# Patient Record
Sex: FEMALE | Race: BLACK OR AFRICAN AMERICAN | NOT HISPANIC OR LATINO | Employment: FULL TIME | RURAL
[De-identification: names, ages, dates, MRNs, and addresses within clinical notes are randomized per-mention and may not be internally consistent; named-entity substitution may affect disease eponyms.]

---

## 2019-07-12 ENCOUNTER — HISTORICAL (OUTPATIENT)
Dept: ADMINISTRATIVE | Facility: HOSPITAL | Age: 29
End: 2019-07-12

## 2019-07-15 LAB
LAB AP CLINICAL INFORMATION: NORMAL
LAB AP DIAGNOSIS - HISTORICAL: NORMAL
LAB AP GROSS PATHOLOGY - HISTORICAL: NORMAL
LAB AP SPECIMEN SUBMITTED - HISTORICAL: NORMAL

## 2020-03-30 ENCOUNTER — HISTORICAL (OUTPATIENT)
Dept: ADMINISTRATIVE | Facility: HOSPITAL | Age: 30
End: 2020-03-30

## 2020-03-30 LAB
ALBUMIN SERPL BCP-MCNC: 3.7 G/DL (ref 3.5–5)
ALBUMIN/GLOB SERPL: 1 {RATIO}
ALP SERPL-CCNC: 37 U/L (ref 37–98)
ALT SERPL W P-5'-P-CCNC: 13 U/L (ref 13–56)
AST SERPL W P-5'-P-CCNC: 10 U/L (ref 15–37)
BASOPHILS # BLD AUTO: 0.03 X10E3/UL (ref 0–0.2)
BASOPHILS NFR BLD AUTO: 0.5 % (ref 0–1)
BILIRUB SERPL-MCNC: 0.5 MG/DL (ref 0–1.2)
BUN SERPL-MCNC: 16 MG/DL (ref 7–18)
BUN/CREAT SERPL: 18.2
CALCIUM SERPL-MCNC: 8.7 MG/DL (ref 8.5–10.1)
CHLORIDE SERPL-SCNC: 105 MMOL/L (ref 98–107)
CO2 SERPL-SCNC: 22 MMOL/L (ref 21–32)
CREAT SERPL-MCNC: 0.88 MG/DL (ref 0.55–1.02)
EOSINOPHIL # BLD AUTO: 0.02 X10E3/UL (ref 0–0.5)
EOSINOPHIL NFR BLD AUTO: 0.3 % (ref 1–4)
ERYTHROCYTE [DISTWIDTH] IN BLOOD BY AUTOMATED COUNT: 13.6 % (ref 11.5–14.5)
GLOBULIN SER-MCNC: 3.7 G/DL (ref 2–4)
GLUCOSE SERPL-MCNC: 85 MG/DL (ref 74–106)
HCT VFR BLD AUTO: 34.9 % (ref 38–47)
HGB BLD-MCNC: 10.9 G/DL (ref 12–16)
IMM GRANULOCYTES # BLD AUTO: 0.02 X10E3/UL (ref 0–0.04)
IMM GRANULOCYTES NFR BLD: 0.3 % (ref 0–0.4)
LYMPHOCYTES # BLD AUTO: 1.44 X10E3/UL (ref 1–4.8)
LYMPHOCYTES NFR BLD AUTO: 24.1 % (ref 27–41)
MCH RBC QN AUTO: 28.2 PG (ref 27–31)
MCHC RBC AUTO-ENTMCNC: 31.2 G/DL (ref 32–36)
MCV RBC AUTO: 90.4 FL (ref 80–96)
MONOCYTES # BLD AUTO: 0.31 X10E3/UL (ref 0–0.8)
MONOCYTES NFR BLD AUTO: 5.2 % (ref 2–6)
MPC BLD CALC-MCNC: 10.3 FL (ref 9.4–12.4)
NEUTROPHILS # BLD AUTO: 4.15 X10E3/UL (ref 1.8–7.7)
NEUTROPHILS NFR BLD AUTO: 69.6 % (ref 53–65)
NRBC # BLD AUTO: 0 X10E3/UL (ref 0–0)
NRBC, AUTO (.00): 0 /100 (ref 0–0)
PLATELET # BLD AUTO: 226 X10E3/UL (ref 150–400)
POTASSIUM SERPL-SCNC: 3.6 MMOL/L (ref 3.5–5.1)
PROT SERPL-MCNC: 7.4 G/DL (ref 6.4–8.2)
RBC # BLD AUTO: 3.86 X10E6/UL (ref 4.2–5.4)
SODIUM SERPL-SCNC: 138 MMOL/L (ref 136–145)
TROPONIN I SERPL-MCNC: <0.017 NG/ML (ref 0–0.06)
WBC # BLD AUTO: 5.97 X10E3/UL (ref 4.5–11)

## 2020-04-06 ENCOUNTER — HISTORICAL (OUTPATIENT)
Dept: ADMINISTRATIVE | Facility: HOSPITAL | Age: 30
End: 2020-04-06

## 2020-04-06 LAB — HCG UR QL IA.RAPID: NEGATIVE

## 2020-06-07 ENCOUNTER — HISTORICAL (OUTPATIENT)
Dept: ADMINISTRATIVE | Facility: HOSPITAL | Age: 30
End: 2020-06-07

## 2020-06-07 LAB
BILIRUB UR QL STRIP: NEGATIVE MG/DL
CLARITY UR: CLEAR
COLOR UR: YELLOW
GLUCOSE UR STRIP-MCNC: NEGATIVE MG/DL
KETONES UR STRIP-SCNC: ABNORMAL MG/DL
LEUKOCYTE ESTERASE UR QL STRIP: NEGATIVE LEU/UL
NITRITE UR QL STRIP: NEGATIVE
PH UR STRIP: 7 PH UNITS (ref 5–8)
PROT UR QL STRIP: ABNORMAL MG/DL
RBC # UR STRIP: NEGATIVE ERY/UL
SP GR UR STRIP: 1.02 (ref 1–1.03)
UROBILINOGEN UR STRIP-ACNC: 1 EU/DL

## 2020-06-08 ENCOUNTER — HISTORICAL (OUTPATIENT)
Dept: ADMINISTRATIVE | Facility: HOSPITAL | Age: 30
End: 2020-06-08

## 2020-09-18 ENCOUNTER — HISTORICAL (OUTPATIENT)
Dept: ADMINISTRATIVE | Facility: HOSPITAL | Age: 30
End: 2020-09-18

## 2020-09-18 LAB
BILIRUB UR QL STRIP: NEGATIVE MG/DL
CLARITY UR: CLEAR
COLOR UR: YELLOW
GLUCOSE UR STRIP-MCNC: NEGATIVE MG/DL
KETONES UR STRIP-SCNC: ABNORMAL MG/DL
LEUKOCYTE ESTERASE UR QL STRIP: NEGATIVE LEU/UL
NITRITE UR QL STRIP: NEGATIVE
PH UR STRIP: 6.5 PH UNITS (ref 5–8)
PROT UR QL STRIP: ABNORMAL MG/DL
RBC # UR STRIP: ABNORMAL ERY/UL
SP GR UR STRIP: 1.02 (ref 1–1.03)
UROBILINOGEN UR STRIP-ACNC: 1 EU/DL

## 2020-09-20 LAB
CHLAMYDIA BY PCR: NEGATIVE
N. GONORRHOEAE (GC) BY PCR: NEGATIVE

## 2020-11-20 ENCOUNTER — HISTORICAL (OUTPATIENT)
Dept: ADMINISTRATIVE | Facility: HOSPITAL | Age: 30
End: 2020-11-20

## 2020-11-20 LAB
ALBUMIN SERPL BCP-MCNC: 4.5 G/DL (ref 3.5–5)
ALBUMIN/GLOB SERPL: 1.2 {RATIO}
ALP SERPL-CCNC: 44 U/L (ref 37–98)
ALT SERPL W P-5'-P-CCNC: 16 U/L (ref 13–56)
AMYLASE SERPL-CCNC: 109 U/L (ref 25–115)
ANION GAP SERPL CALCULATED.3IONS-SCNC: 16 MMOL/L
AST SERPL W P-5'-P-CCNC: 16 U/L (ref 15–37)
BASOPHILS # BLD AUTO: 0.04 X10E3/UL (ref 0–0.2)
BASOPHILS NFR BLD AUTO: 0.6 % (ref 0–1)
BILIRUB SERPL-MCNC: 0.4 MG/DL (ref 0–1.2)
BILIRUB UR QL STRIP: NEGATIVE MG/DL
BUN SERPL-MCNC: 19 MG/DL (ref 7–18)
BUN/CREAT SERPL: 20.4
CALCIUM SERPL-MCNC: 8.9 MG/DL (ref 8.5–10.1)
CHLORIDE SERPL-SCNC: 104 MMOL/L (ref 98–107)
CLARITY UR: CLEAR
CO2 SERPL-SCNC: 22 MMOL/L (ref 21–32)
COLOR UR: ABNORMAL
CREAT SERPL-MCNC: 0.93 MG/DL (ref 0.55–1.02)
EOSINOPHIL # BLD AUTO: 0.03 X10E3/UL (ref 0–0.5)
EOSINOPHIL NFR BLD AUTO: 0.4 % (ref 1–4)
ERYTHROCYTE [DISTWIDTH] IN BLOOD BY AUTOMATED COUNT: 16 % (ref 11.5–14.5)
GLOBULIN SER-MCNC: 3.7 G/DL (ref 2–4)
GLUCOSE SERPL-MCNC: 85 MG/DL (ref 74–106)
GLUCOSE UR STRIP-MCNC: NEGATIVE MG/DL
HCT VFR BLD AUTO: 34.2 % (ref 38–47)
HGB BLD-MCNC: 10.6 G/DL (ref 12–16)
IMM GRANULOCYTES # BLD AUTO: 0.01 X10E3/UL (ref 0–0.04)
IMM GRANULOCYTES NFR BLD: 0.1 % (ref 0–0.4)
KETONES UR STRIP-SCNC: NEGATIVE MG/DL
LEUKOCYTE ESTERASE UR QL STRIP: NEGATIVE LEU/UL
LIPASE SERPL-CCNC: 101 U/L (ref 73–393)
LYMPHOCYTES # BLD AUTO: 1.7 X10E3/UL (ref 1–4.8)
LYMPHOCYTES NFR BLD AUTO: 23.8 % (ref 27–41)
MCH RBC QN AUTO: 27.2 PG (ref 27–31)
MCHC RBC AUTO-ENTMCNC: 31 G/DL (ref 32–36)
MCV RBC AUTO: 87.7 FL (ref 80–96)
MONOCYTES # BLD AUTO: 0.28 X10E3/UL (ref 0–0.8)
MONOCYTES NFR BLD AUTO: 3.9 % (ref 2–6)
MPC BLD CALC-MCNC: 10.3 FL (ref 9.4–12.4)
NEUTROPHILS # BLD AUTO: 5.09 X10E3/UL (ref 1.8–7.7)
NEUTROPHILS NFR BLD AUTO: 71.2 % (ref 53–65)
NITRITE UR QL STRIP: NEGATIVE
NRBC # BLD AUTO: 0 X10E3/UL (ref 0–0)
NRBC, AUTO (.00): 0 /100 (ref 0–0)
PH UR STRIP: 6.5 PH UNITS (ref 5–8)
PLATELET # BLD AUTO: 332 X10E3/UL (ref 150–400)
POTASSIUM SERPL-SCNC: 3.7 MMOL/L (ref 3.5–5.1)
PROT SERPL-MCNC: 8.2 G/DL (ref 6.4–8.2)
PROT UR QL STRIP: NEGATIVE MG/DL
RBC # BLD AUTO: 3.9 X10E6/UL (ref 4.2–5.4)
RBC # UR STRIP: NEGATIVE ERY/UL
SARS-COV-2 RNA AMPLIFICATION, QUAL: NEGATIVE
SODIUM SERPL-SCNC: 138 MMOL/L (ref 136–145)
SP GR UR STRIP: 1.02 (ref 1–1.03)
UROBILINOGEN UR STRIP-ACNC: 0.2 EU/DL
WBC # BLD AUTO: 7.15 X10E3/UL (ref 4.5–11)

## 2021-01-25 ENCOUNTER — HISTORICAL (OUTPATIENT)
Dept: ADMINISTRATIVE | Facility: HOSPITAL | Age: 31
End: 2021-01-25

## 2021-01-25 LAB
ALBUMIN SERPL BCP-MCNC: 3.7 G/DL (ref 3.5–5)
ALBUMIN/GLOB SERPL: 1 {RATIO}
ALP SERPL-CCNC: 39 U/L (ref 37–98)
ALT SERPL W P-5'-P-CCNC: 19 U/L (ref 13–56)
ANION GAP SERPL CALCULATED.3IONS-SCNC: 12 MMOL/L
AST SERPL W P-5'-P-CCNC: 16 U/L (ref 15–37)
BASOPHILS # BLD AUTO: 0.03 X10E3/UL (ref 0–0.2)
BASOPHILS NFR BLD AUTO: 0.7 % (ref 0–1)
BILIRUB SERPL-MCNC: 0.4 MG/DL (ref 0–1.2)
BILIRUB UR QL STRIP: NEGATIVE MG/DL
BUN SERPL-MCNC: 14 MG/DL (ref 7–18)
BUN/CREAT SERPL: 17.5
CALCIUM SERPL-MCNC: 8.7 MG/DL (ref 8.5–10.1)
CHLORIDE SERPL-SCNC: 107 MMOL/L (ref 98–107)
CLARITY UR: CLEAR
CO2 SERPL-SCNC: 28 MMOL/L (ref 21–32)
COLOR UR: ABNORMAL
CREAT SERPL-MCNC: 0.8 MG/DL (ref 0.55–1.02)
EOSINOPHIL # BLD AUTO: 0.04 X10E3/UL (ref 0–0.5)
EOSINOPHIL NFR BLD AUTO: 1 % (ref 1–4)
ERYTHROCYTE [DISTWIDTH] IN BLOOD BY AUTOMATED COUNT: 15 % (ref 11.5–14.5)
GLOBULIN SER-MCNC: 3.7 G/DL (ref 2–4)
GLUCOSE SERPL-MCNC: 87 MG/DL (ref 74–106)
GLUCOSE UR STRIP-MCNC: NEGATIVE MG/DL
HCG UR QL IA.RAPID: NEGATIVE
HCT VFR BLD AUTO: 33 % (ref 38–47)
HGB BLD-MCNC: 10.2 G/DL (ref 12–16)
IMM GRANULOCYTES # BLD AUTO: 0.01 X10E3/UL (ref 0–0.04)
IMM GRANULOCYTES NFR BLD: 0.2 % (ref 0–0.4)
KETONES UR STRIP-SCNC: NEGATIVE MG/DL
LEUKOCYTE ESTERASE UR QL STRIP: NEGATIVE LEU/UL
LYMPHOCYTES # BLD AUTO: 1.63 X10E3/UL (ref 1–4.8)
LYMPHOCYTES NFR BLD AUTO: 39.1 % (ref 27–41)
MCH RBC QN AUTO: 26.8 PG (ref 27–31)
MCHC RBC AUTO-ENTMCNC: 30.9 G/DL (ref 32–36)
MCV RBC AUTO: 86.6 FL (ref 80–96)
MONOCYTES # BLD AUTO: 0.32 X10E3/UL (ref 0–0.8)
MONOCYTES NFR BLD AUTO: 7.7 % (ref 2–6)
MPC BLD CALC-MCNC: 10.1 FL (ref 9.4–12.4)
NEUTROPHILS # BLD AUTO: 2.14 X10E3/UL (ref 1.8–7.7)
NEUTROPHILS NFR BLD AUTO: 51.3 % (ref 53–65)
NITRITE UR QL STRIP: NEGATIVE
NRBC # BLD AUTO: 0 X10E3/UL (ref 0–0)
NRBC, AUTO (.00): 0 /100 (ref 0–0)
PH UR STRIP: 7.5 PH UNITS (ref 5–8)
PLATELET # BLD AUTO: 273 X10E3/UL (ref 150–400)
POTASSIUM SERPL-SCNC: 4.2 MMOL/L (ref 3.5–5.1)
PROT SERPL-MCNC: 7.4 G/DL (ref 6.4–8.2)
PROT UR QL STRIP: NEGATIVE MG/DL
RBC # BLD AUTO: 3.81 X10E6/UL (ref 4.2–5.4)
RBC # UR STRIP: NEGATIVE ERY/UL
SODIUM SERPL-SCNC: 143 MMOL/L (ref 136–145)
SP GR UR STRIP: 1.02 (ref 1–1.03)
UROBILINOGEN UR STRIP-ACNC: 0.2 EU/DL
WBC # BLD AUTO: 4.17 X10E3/UL (ref 4.5–11)

## 2021-02-26 ENCOUNTER — HISTORICAL (OUTPATIENT)
Dept: ADMINISTRATIVE | Facility: HOSPITAL | Age: 31
End: 2021-02-26

## 2021-03-11 ENCOUNTER — HISTORICAL (OUTPATIENT)
Dept: ADMINISTRATIVE | Facility: HOSPITAL | Age: 31
End: 2021-03-11

## 2021-03-16 LAB
LAB AP GENERAL CAT - HISTORICAL: NORMAL
LAB AP INTERPRETATION/RESULT - HISTORICAL: NEGATIVE
LAB AP SPECIMEN ADEQUACY - HISTORICAL: NORMAL
LAB AP SPECIMEN SUBMITTED - HISTORICAL: NORMAL

## 2021-06-27 ENCOUNTER — HOSPITAL ENCOUNTER (EMERGENCY)
Facility: HOSPITAL | Age: 31
Discharge: HOME | End: 2021-06-28
Attending: EMERGENCY MEDICINE
Payer: COMMERCIAL

## 2021-06-27 VITALS
SYSTOLIC BLOOD PRESSURE: 154 MMHG | RESPIRATION RATE: 16 BRPM | DIASTOLIC BLOOD PRESSURE: 116 MMHG | TEMPERATURE: 99 F | BODY MASS INDEX: 27.64 KG/M2 | HEART RATE: 87 BPM | OXYGEN SATURATION: 98 % | WEIGHT: 172 LBS | HEIGHT: 66 IN

## 2021-06-27 DIAGNOSIS — G43.001 MIGRAINE WITHOUT AURA AND WITH STATUS MIGRAINOSUS, NOT INTRACTABLE: Primary | ICD-10-CM

## 2021-06-27 DIAGNOSIS — R07.9 CHEST PAIN: ICD-10-CM

## 2021-06-27 LAB — TROPONIN I SERPL-MCNC: <0.017 NG/ML

## 2021-06-27 PROCEDURE — 99283 EMERGENCY DEPT VISIT LOW MDM: CPT | Mod: ,,, | Performed by: EMERGENCY MEDICINE

## 2021-06-27 PROCEDURE — 99283 PR EMERGENCY DEPT VISIT,LEVEL III: ICD-10-PCS | Mod: ,,, | Performed by: EMERGENCY MEDICINE

## 2021-06-27 PROCEDURE — 93010 EKG 12-LEAD: ICD-10-PCS | Mod: ,,, | Performed by: HOSPITALIST

## 2021-06-27 PROCEDURE — 93005 ELECTROCARDIOGRAM TRACING: CPT

## 2021-06-27 PROCEDURE — 96365 THER/PROPH/DIAG IV INF INIT: CPT

## 2021-06-27 PROCEDURE — 99285 EMERGENCY DEPT VISIT HI MDM: CPT | Mod: 25

## 2021-06-27 PROCEDURE — 36415 COLL VENOUS BLD VENIPUNCTURE: CPT | Performed by: EMERGENCY MEDICINE

## 2021-06-27 PROCEDURE — 63600175 PHARM REV CODE 636 W HCPCS: Performed by: EMERGENCY MEDICINE

## 2021-06-27 PROCEDURE — 93010 ELECTROCARDIOGRAM REPORT: CPT | Mod: ,,, | Performed by: HOSPITALIST

## 2021-06-27 PROCEDURE — 84484 ASSAY OF TROPONIN QUANT: CPT | Performed by: EMERGENCY MEDICINE

## 2021-06-27 RX ORDER — ACETAMINOPHEN 10 MG/ML
1000 INJECTION, SOLUTION INTRAVENOUS EVERY 8 HOURS
Status: DISCONTINUED | OUTPATIENT
Start: 2021-06-27 | End: 2021-06-28 | Stop reason: HOSPADM

## 2021-06-27 RX ORDER — PROCHLORPERAZINE EDISYLATE 5 MG/ML
10 INJECTION INTRAMUSCULAR; INTRAVENOUS
Status: DISCONTINUED | OUTPATIENT
Start: 2021-06-27 | End: 2021-06-28 | Stop reason: HOSPADM

## 2021-06-27 RX ADMIN — ACETAMINOPHEN 1000 MG: 10 INJECTION, SOLUTION INTRAVENOUS at 10:06

## 2021-09-02 ENCOUNTER — OFFICE VISIT (OUTPATIENT)
Dept: NEUROLOGY | Facility: CLINIC | Age: 31
End: 2021-09-02
Payer: COMMERCIAL

## 2021-09-02 VITALS
HEIGHT: 66 IN | WEIGHT: 172 LBS | SYSTOLIC BLOOD PRESSURE: 150 MMHG | DIASTOLIC BLOOD PRESSURE: 100 MMHG | BODY MASS INDEX: 27.64 KG/M2 | OXYGEN SATURATION: 99 % | HEART RATE: 91 BPM

## 2021-09-02 DIAGNOSIS — Z91.148 NON COMPLIANCE W MEDICATION REGIMEN: ICD-10-CM

## 2021-09-02 DIAGNOSIS — G44.40 REBOUND HEADACHE: ICD-10-CM

## 2021-09-02 DIAGNOSIS — I10 HYPERTENSION, UNSPECIFIED TYPE: ICD-10-CM

## 2021-09-02 PROCEDURE — 1159F MED LIST DOCD IN RCRD: CPT | Mod: ,,, | Performed by: NURSE PRACTITIONER

## 2021-09-02 PROCEDURE — 1159F PR MEDICATION LIST DOCUMENTED IN MEDICAL RECORD: ICD-10-PCS | Mod: ,,, | Performed by: NURSE PRACTITIONER

## 2021-09-02 PROCEDURE — 3077F SYST BP >= 140 MM HG: CPT | Mod: ,,, | Performed by: NURSE PRACTITIONER

## 2021-09-02 PROCEDURE — 99214 PR OFFICE/OUTPT VISIT, EST, LEVL IV, 30-39 MIN: ICD-10-PCS | Mod: S$PBB,,, | Performed by: NURSE PRACTITIONER

## 2021-09-02 PROCEDURE — 1160F RVW MEDS BY RX/DR IN RCRD: CPT | Mod: ,,, | Performed by: NURSE PRACTITIONER

## 2021-09-02 PROCEDURE — 3080F DIAST BP >= 90 MM HG: CPT | Mod: ,,, | Performed by: NURSE PRACTITIONER

## 2021-09-02 PROCEDURE — 3080F PR MOST RECENT DIASTOLIC BLOOD PRESSURE >= 90 MM HG: ICD-10-PCS | Mod: ,,, | Performed by: NURSE PRACTITIONER

## 2021-09-02 PROCEDURE — 99214 OFFICE O/P EST MOD 30 MIN: CPT | Mod: PBBFAC | Performed by: NURSE PRACTITIONER

## 2021-09-02 PROCEDURE — 3008F BODY MASS INDEX DOCD: CPT | Mod: ,,, | Performed by: NURSE PRACTITIONER

## 2021-09-02 PROCEDURE — 1160F PR REVIEW ALL MEDS BY PRESCRIBER/CLIN PHARMACIST DOCUMENTED: ICD-10-PCS | Mod: ,,, | Performed by: NURSE PRACTITIONER

## 2021-09-02 PROCEDURE — 3077F PR MOST RECENT SYSTOLIC BLOOD PRESSURE >= 140 MM HG: ICD-10-PCS | Mod: ,,, | Performed by: NURSE PRACTITIONER

## 2021-09-02 PROCEDURE — 3008F PR BODY MASS INDEX (BMI) DOCUMENTED: ICD-10-PCS | Mod: ,,, | Performed by: NURSE PRACTITIONER

## 2021-09-02 PROCEDURE — 99214 OFFICE O/P EST MOD 30 MIN: CPT | Mod: S$PBB,,, | Performed by: NURSE PRACTITIONER

## 2021-09-02 RX ORDER — PROPRANOLOL HYDROCHLORIDE 40 MG/1
TABLET ORAL
Qty: 60 TABLET | Refills: 11 | Status: SHIPPED | OUTPATIENT
Start: 2021-09-02 | End: 2021-11-02

## 2021-09-02 RX ORDER — PROMETHAZINE HYDROCHLORIDE 25 MG/1
TABLET ORAL
Qty: 20 TABLET | Refills: 1 | Status: SHIPPED | OUTPATIENT
Start: 2021-09-02 | End: 2022-03-04

## 2021-09-06 ENCOUNTER — HOSPITAL ENCOUNTER (EMERGENCY)
Facility: HOSPITAL | Age: 31
Discharge: HOME | End: 2021-09-06
Attending: EMERGENCY MEDICINE
Payer: COMMERCIAL

## 2021-09-06 VITALS
HEIGHT: 66 IN | SYSTOLIC BLOOD PRESSURE: 147 MMHG | TEMPERATURE: 99 F | DIASTOLIC BLOOD PRESSURE: 97 MMHG | OXYGEN SATURATION: 99 % | WEIGHT: 172 LBS | RESPIRATION RATE: 15 BRPM | BODY MASS INDEX: 27.64 KG/M2 | HEART RATE: 78 BPM

## 2021-09-06 DIAGNOSIS — G43.909 MIGRAINE WITHOUT STATUS MIGRAINOSUS, NOT INTRACTABLE, UNSPECIFIED MIGRAINE TYPE: Primary | ICD-10-CM

## 2021-09-06 DIAGNOSIS — G44.40 REBOUND HEADACHE: ICD-10-CM

## 2021-09-06 DIAGNOSIS — Z91.148 NON COMPLIANCE W MEDICATION REGIMEN: ICD-10-CM

## 2021-09-06 DIAGNOSIS — I10 HYPERTENSION: Chronic | ICD-10-CM

## 2021-09-06 PROCEDURE — 99282 EMERGENCY DEPT VISIT SF MDM: CPT

## 2021-09-06 PROCEDURE — 99282 EMERGENCY DEPT VISIT SF MDM: CPT | Mod: ,,, | Performed by: EMERGENCY MEDICINE

## 2021-09-06 PROCEDURE — 99282 PR EMERGENCY DEPT VISIT,LEVEL II: ICD-10-PCS | Mod: ,,, | Performed by: EMERGENCY MEDICINE

## 2021-11-02 ENCOUNTER — OFFICE VISIT (OUTPATIENT)
Dept: NEUROLOGY | Facility: CLINIC | Age: 31
End: 2021-11-02
Payer: COMMERCIAL

## 2021-11-02 VITALS
HEART RATE: 85 BPM | RESPIRATION RATE: 18 BRPM | BODY MASS INDEX: 27.64 KG/M2 | WEIGHT: 172 LBS | HEIGHT: 66 IN | OXYGEN SATURATION: 99 % | SYSTOLIC BLOOD PRESSURE: 146 MMHG | DIASTOLIC BLOOD PRESSURE: 92 MMHG

## 2021-11-02 DIAGNOSIS — G43.709 CHRONIC MIGRAINE WITHOUT AURA WITHOUT STATUS MIGRAINOSUS, NOT INTRACTABLE: Primary | ICD-10-CM

## 2021-11-02 DIAGNOSIS — Z91.148 NON COMPLIANCE W MEDICATION REGIMEN: ICD-10-CM

## 2021-11-02 DIAGNOSIS — G44.40 REBOUND HEADACHE: ICD-10-CM

## 2021-11-02 PROCEDURE — 3008F BODY MASS INDEX DOCD: CPT | Mod: ,,, | Performed by: NURSE PRACTITIONER

## 2021-11-02 PROCEDURE — 3077F PR MOST RECENT SYSTOLIC BLOOD PRESSURE >= 140 MM HG: ICD-10-PCS | Mod: ,,, | Performed by: NURSE PRACTITIONER

## 2021-11-02 PROCEDURE — 3077F SYST BP >= 140 MM HG: CPT | Mod: ,,, | Performed by: NURSE PRACTITIONER

## 2021-11-02 PROCEDURE — 99214 OFFICE O/P EST MOD 30 MIN: CPT | Mod: PBBFAC | Performed by: NURSE PRACTITIONER

## 2021-11-02 PROCEDURE — 1160F PR REVIEW ALL MEDS BY PRESCRIBER/CLIN PHARMACIST DOCUMENTED: ICD-10-PCS | Mod: ,,, | Performed by: NURSE PRACTITIONER

## 2021-11-02 PROCEDURE — 3080F DIAST BP >= 90 MM HG: CPT | Mod: ,,, | Performed by: NURSE PRACTITIONER

## 2021-11-02 PROCEDURE — 1159F MED LIST DOCD IN RCRD: CPT | Mod: ,,, | Performed by: NURSE PRACTITIONER

## 2021-11-02 PROCEDURE — 3080F PR MOST RECENT DIASTOLIC BLOOD PRESSURE >= 90 MM HG: ICD-10-PCS | Mod: ,,, | Performed by: NURSE PRACTITIONER

## 2021-11-02 PROCEDURE — 99214 PR OFFICE/OUTPT VISIT, EST, LEVL IV, 30-39 MIN: ICD-10-PCS | Mod: S$PBB,,, | Performed by: NURSE PRACTITIONER

## 2021-11-02 PROCEDURE — 1159F PR MEDICATION LIST DOCUMENTED IN MEDICAL RECORD: ICD-10-PCS | Mod: ,,, | Performed by: NURSE PRACTITIONER

## 2021-11-02 PROCEDURE — 3008F PR BODY MASS INDEX (BMI) DOCUMENTED: ICD-10-PCS | Mod: ,,, | Performed by: NURSE PRACTITIONER

## 2021-11-02 PROCEDURE — 99214 OFFICE O/P EST MOD 30 MIN: CPT | Mod: S$PBB,,, | Performed by: NURSE PRACTITIONER

## 2021-11-02 PROCEDURE — 1160F RVW MEDS BY RX/DR IN RCRD: CPT | Mod: ,,, | Performed by: NURSE PRACTITIONER

## 2021-11-02 RX ORDER — NAPROXEN 500 MG/1
500 TABLET ORAL 2 TIMES DAILY
Qty: 20 TABLET | Refills: 3 | Status: SHIPPED | OUTPATIENT
Start: 2021-11-02 | End: 2023-01-30 | Stop reason: CLARIF

## 2021-11-02 RX ORDER — PROPRANOLOL HYDROCHLORIDE 60 MG/1
60 TABLET ORAL 3 TIMES DAILY
Qty: 90 TABLET | Refills: 11 | Status: SHIPPED | OUTPATIENT
Start: 2021-11-02 | End: 2023-09-01

## 2021-11-30 ENCOUNTER — HOSPITAL ENCOUNTER (EMERGENCY)
Facility: HOSPITAL | Age: 31
Discharge: HOME | End: 2021-11-30
Attending: EMERGENCY MEDICINE
Payer: COMMERCIAL

## 2021-11-30 VITALS
OXYGEN SATURATION: 99 % | BODY MASS INDEX: 27.32 KG/M2 | RESPIRATION RATE: 16 BRPM | TEMPERATURE: 99 F | DIASTOLIC BLOOD PRESSURE: 82 MMHG | HEIGHT: 66 IN | HEART RATE: 73 BPM | SYSTOLIC BLOOD PRESSURE: 138 MMHG | WEIGHT: 170 LBS

## 2021-11-30 DIAGNOSIS — Z91.148 NON COMPLIANCE W MEDICATION REGIMEN: ICD-10-CM

## 2021-11-30 DIAGNOSIS — G43.709 CHRONIC MIGRAINE WITHOUT AURA: ICD-10-CM

## 2021-11-30 DIAGNOSIS — N93.9 VAGINAL BLEEDING: ICD-10-CM

## 2021-11-30 DIAGNOSIS — Z32.01 POSITIVE PREGNANCY TEST: Primary | ICD-10-CM

## 2021-11-30 DIAGNOSIS — G44.40 REBOUND HEADACHE: ICD-10-CM

## 2021-11-30 DIAGNOSIS — I10 HYPERTENSION: Chronic | ICD-10-CM

## 2021-11-30 LAB
ALBUMIN SERPL BCP-MCNC: 3.5 G/DL (ref 3.5–5)
ALBUMIN/GLOB SERPL: 0.9 {RATIO}
ALP SERPL-CCNC: 44 U/L (ref 37–98)
ALT SERPL W P-5'-P-CCNC: 14 U/L (ref 13–56)
ANION GAP SERPL CALCULATED.3IONS-SCNC: 14 MMOL/L (ref 7–16)
AST SERPL W P-5'-P-CCNC: 12 U/L (ref 15–37)
B-HCG UR QL: POSITIVE
BASOPHILS # BLD AUTO: 0.04 K/UL (ref 0–0.2)
BASOPHILS NFR BLD AUTO: 0.6 % (ref 0–1)
BILIRUB SERPL-MCNC: 0.5 MG/DL (ref 0–1.2)
BILIRUB UR QL STRIP: NEGATIVE
BUN SERPL-MCNC: 11 MG/DL (ref 7–18)
BUN/CREAT SERPL: 16 (ref 6–20)
CALCIUM SERPL-MCNC: 8.6 MG/DL (ref 8.5–10.1)
CHLORIDE SERPL-SCNC: 106 MMOL/L (ref 98–107)
CLARITY UR: CLEAR
CO2 SERPL-SCNC: 24 MMOL/L (ref 21–32)
COLOR UR: NORMAL
CREAT SERPL-MCNC: 0.67 MG/DL (ref 0.55–1.02)
CTP QC/QA: YES
DIFFERENTIAL METHOD BLD: ABNORMAL
EOSINOPHIL # BLD AUTO: 0.06 K/UL (ref 0–0.5)
EOSINOPHIL NFR BLD AUTO: 1 % (ref 1–4)
ERYTHROCYTE [DISTWIDTH] IN BLOOD BY AUTOMATED COUNT: 14.7 % (ref 11.5–14.5)
GLOBULIN SER-MCNC: 3.7 G/DL (ref 2–4)
GLUCOSE SERPL-MCNC: 89 MG/DL (ref 74–106)
GLUCOSE UR STRIP-MCNC: NEGATIVE MG/DL
HCG SERPL-ACNC: 538 MIU/ML
HCT VFR BLD AUTO: 32.9 % (ref 38–47)
HGB BLD-MCNC: 10.3 G/DL (ref 12–16)
IMM GRANULOCYTES # BLD AUTO: 0.01 K/UL (ref 0–0.04)
IMM GRANULOCYTES NFR BLD: 0.2 % (ref 0–0.4)
KETONES UR STRIP-SCNC: NORMAL MG/DL
LEUKOCYTE ESTERASE UR QL STRIP: NEGATIVE
LIPASE SERPL-CCNC: 201 U/L (ref 73–393)
LYMPHOCYTES # BLD AUTO: 1.68 K/UL (ref 1–4.8)
LYMPHOCYTES NFR BLD AUTO: 27 % (ref 27–41)
MCH RBC QN AUTO: 27.1 PG (ref 27–31)
MCHC RBC AUTO-ENTMCNC: 31.3 G/DL (ref 32–36)
MCV RBC AUTO: 86.6 FL (ref 80–96)
MONOCYTES # BLD AUTO: 0.47 K/UL (ref 0–0.8)
MONOCYTES NFR BLD AUTO: 7.6 % (ref 2–6)
MPC BLD CALC-MCNC: 10.2 FL (ref 9.4–12.4)
NEUTROPHILS # BLD AUTO: 3.96 K/UL (ref 1.8–7.7)
NEUTROPHILS NFR BLD AUTO: 63.6 % (ref 53–65)
NITRITE UR QL STRIP: NEGATIVE
NRBC # BLD AUTO: 0 X10E3/UL
NRBC, AUTO (.00): 0 %
PH UR STRIP: 7 PH UNITS
PLATELET # BLD AUTO: 271 K/UL (ref 150–400)
POTASSIUM SERPL-SCNC: 4.1 MMOL/L (ref 3.5–5.1)
PROT SERPL-MCNC: 7.2 G/DL (ref 6.4–8.2)
PROT UR QL STRIP: NEGATIVE
RBC # BLD AUTO: 3.8 M/UL (ref 4.2–5.4)
RBC # UR STRIP: NEGATIVE /UL
SODIUM SERPL-SCNC: 140 MMOL/L (ref 136–145)
SP GR UR STRIP: 1.01
UROBILINOGEN UR STRIP-ACNC: 0.2 MG/DL
WBC # BLD AUTO: 6.22 K/UL (ref 4.5–11)

## 2021-11-30 PROCEDURE — 99284 EMERGENCY DEPT VISIT MOD MDM: CPT | Mod: 25

## 2021-11-30 PROCEDURE — 83690 ASSAY OF LIPASE: CPT | Performed by: EMERGENCY MEDICINE

## 2021-11-30 PROCEDURE — 84702 CHORIONIC GONADOTROPIN TEST: CPT | Performed by: EMERGENCY MEDICINE

## 2021-11-30 PROCEDURE — 99283 PR EMERGENCY DEPT VISIT,LEVEL III: ICD-10-PCS | Mod: ,,, | Performed by: EMERGENCY MEDICINE

## 2021-11-30 PROCEDURE — 81025 URINE PREGNANCY TEST: CPT | Performed by: EMERGENCY MEDICINE

## 2021-11-30 PROCEDURE — 81003 URINALYSIS AUTO W/O SCOPE: CPT | Performed by: EMERGENCY MEDICINE

## 2021-11-30 PROCEDURE — 84702 CHORIONIC GONADOTROPIN TEST: CPT | Mod: 90 | Performed by: EMERGENCY MEDICINE

## 2021-11-30 PROCEDURE — 36415 COLL VENOUS BLD VENIPUNCTURE: CPT | Performed by: EMERGENCY MEDICINE

## 2021-11-30 PROCEDURE — 80053 COMPREHEN METABOLIC PANEL: CPT | Performed by: EMERGENCY MEDICINE

## 2021-11-30 PROCEDURE — 99283 EMERGENCY DEPT VISIT LOW MDM: CPT | Mod: ,,, | Performed by: EMERGENCY MEDICINE

## 2021-11-30 PROCEDURE — 85025 COMPLETE CBC W/AUTO DIFF WBC: CPT | Performed by: EMERGENCY MEDICINE

## 2021-12-14 ENCOUNTER — TELEPHONE (OUTPATIENT)
Dept: NEUROLOGY | Facility: CLINIC | Age: 31
End: 2021-12-14

## 2022-03-03 DIAGNOSIS — G43.709 CHRONIC MIGRAINE WITHOUT AURA, NOT INTRACTABLE, WITHOUT STATUS MIGRAINOSUS: ICD-10-CM

## 2022-03-04 RX ORDER — PROMETHAZINE HYDROCHLORIDE 25 MG/1
TABLET ORAL
Qty: 20 TABLET | Refills: 1 | Status: SHIPPED | OUTPATIENT
Start: 2022-03-04 | End: 2023-01-30 | Stop reason: CLARIF

## 2022-04-10 ENCOUNTER — HOSPITAL ENCOUNTER (OUTPATIENT)
Facility: HOSPITAL | Age: 32
Discharge: HOME | End: 2022-04-10
Attending: OBSTETRICS & GYNECOLOGY | Admitting: OBSTETRICS & GYNECOLOGY
Payer: MEDICAID

## 2022-04-10 VITALS
TEMPERATURE: 98 F | RESPIRATION RATE: 18 BRPM | DIASTOLIC BLOOD PRESSURE: 71 MMHG | OXYGEN SATURATION: 98 % | HEART RATE: 74 BPM | SYSTOLIC BLOOD PRESSURE: 121 MMHG

## 2022-04-10 DIAGNOSIS — Z34.90 PREGNANT: ICD-10-CM

## 2022-04-10 LAB
BILIRUB UR QL STRIP: NEGATIVE
CLARITY UR: CLEAR
COLOR UR: YELLOW
GLUCOSE UR STRIP-MCNC: NEGATIVE MG/DL
KETONES UR STRIP-SCNC: NEGATIVE MG/DL
LEUKOCYTE ESTERASE UR QL STRIP: NEGATIVE
NITRITE UR QL STRIP: NEGATIVE
PH UR STRIP: 7 PH UNITS
PROT UR QL STRIP: NEGATIVE
RBC # UR STRIP: NEGATIVE /UL
SP GR UR STRIP: 1.01
UROBILINOGEN UR STRIP-ACNC: 0.2 MG/DL

## 2022-04-10 PROCEDURE — 81003 URINALYSIS AUTO W/O SCOPE: CPT | Performed by: OBSTETRICS & GYNECOLOGY

## 2022-04-10 PROCEDURE — 99211 OFF/OP EST MAY X REQ PHY/QHP: CPT

## 2022-06-17 ENCOUNTER — HOSPITAL ENCOUNTER (INPATIENT)
Facility: HOSPITAL | Age: 32
LOS: 1 days | Discharge: HOME | End: 2022-06-18
Attending: OBSTETRICS & GYNECOLOGY | Admitting: OBSTETRICS & GYNECOLOGY
Payer: MEDICAID

## 2022-06-17 DIAGNOSIS — Z34.90 PREGNANCY: ICD-10-CM

## 2022-06-17 PROBLEM — O11.9 CHRONIC HYPERTENSION WITH SUPERIMPOSED PREECLAMPSIA: Status: ACTIVE | Noted: 2022-06-17

## 2022-06-17 PROBLEM — Z3A.32 32 WEEKS GESTATION OF PREGNANCY: Status: ACTIVE | Noted: 2022-06-17

## 2022-06-17 PROBLEM — O10.913 PREEXISTING HYPERTENSION COMPLICATING PREGNANCY, ANTEPARTUM, THIRD TRIMESTER: Status: ACTIVE | Noted: 2022-06-17

## 2022-06-17 LAB
ALBUMIN SERPL BCP-MCNC: 2.6 G/DL (ref 3.5–5)
ALBUMIN SERPL BCP-MCNC: 2.8 G/DL (ref 3.5–5)
ALBUMIN/GLOB SERPL: 0.8 {RATIO}
ALBUMIN/GLOB SERPL: 0.8 {RATIO}
ALP SERPL-CCNC: 105 U/L (ref 37–98)
ALP SERPL-CCNC: 108 U/L (ref 37–98)
ALT SERPL W P-5'-P-CCNC: 30 U/L (ref 13–56)
ALT SERPL W P-5'-P-CCNC: 37 U/L (ref 13–56)
ANION GAP SERPL CALCULATED.3IONS-SCNC: 10 MMOL/L (ref 7–16)
ANION GAP SERPL CALCULATED.3IONS-SCNC: 13 MMOL/L (ref 7–16)
AST SERPL W P-5'-P-CCNC: 20 U/L (ref 15–37)
AST SERPL W P-5'-P-CCNC: 21 U/L (ref 15–37)
BACTERIA #/AREA URNS HPF: ABNORMAL /HPF
BASOPHILS # BLD AUTO: 0.02 K/UL (ref 0–0.2)
BASOPHILS # BLD AUTO: 0.02 K/UL (ref 0–0.2)
BASOPHILS NFR BLD AUTO: 0.3 % (ref 0–1)
BASOPHILS NFR BLD AUTO: 0.3 % (ref 0–1)
BILIRUB SERPL-MCNC: 0.3 MG/DL (ref 0–1.2)
BILIRUB SERPL-MCNC: 0.3 MG/DL (ref 0–1.2)
BILIRUB UR QL STRIP: NEGATIVE
BUN SERPL-MCNC: 10 MG/DL (ref 7–18)
BUN SERPL-MCNC: 7 MG/DL (ref 7–18)
BUN/CREAT SERPL: 10 (ref 6–20)
BUN/CREAT SERPL: 15 (ref 6–20)
CALCIUM SERPL-MCNC: 8.2 MG/DL (ref 8.5–10.1)
CALCIUM SERPL-MCNC: 8.5 MG/DL (ref 8.5–10.1)
CHLORIDE SERPL-SCNC: 104 MMOL/L (ref 98–107)
CHLORIDE SERPL-SCNC: 106 MMOL/L (ref 98–107)
CLARITY UR: CLEAR
CO2 SERPL-SCNC: 24 MMOL/L (ref 21–32)
CO2 SERPL-SCNC: 26 MMOL/L (ref 21–32)
COLOR UR: YELLOW
CREAT SERPL-MCNC: 0.68 MG/DL (ref 0.55–1.02)
CREAT SERPL-MCNC: 0.7 MG/DL (ref 0.55–1.02)
CREAT UR-MCNC: 257 MG/DL (ref 28–219)
DIFFERENTIAL METHOD BLD: ABNORMAL
DIFFERENTIAL METHOD BLD: ABNORMAL
EOSINOPHIL # BLD AUTO: 0.02 K/UL (ref 0–0.5)
EOSINOPHIL # BLD AUTO: 0.11 K/UL (ref 0–0.5)
EOSINOPHIL NFR BLD AUTO: 0.3 % (ref 1–4)
EOSINOPHIL NFR BLD AUTO: 1.6 % (ref 1–4)
ERYTHROCYTE [DISTWIDTH] IN BLOOD BY AUTOMATED COUNT: 13.8 % (ref 11.5–14.5)
ERYTHROCYTE [DISTWIDTH] IN BLOOD BY AUTOMATED COUNT: 14 % (ref 11.5–14.5)
GLOBULIN SER-MCNC: 3.3 G/DL (ref 2–4)
GLOBULIN SER-MCNC: 3.5 G/DL (ref 2–4)
GLUCOSE SERPL-MCNC: 130 MG/DL (ref 74–106)
GLUCOSE SERPL-MCNC: 138 MG/DL (ref 74–106)
GLUCOSE UR STRIP-MCNC: NEGATIVE MG/DL
HCT VFR BLD AUTO: 28.5 % (ref 38–47)
HCT VFR BLD AUTO: 30.3 % (ref 38–47)
HGB BLD-MCNC: 9.4 G/DL (ref 12–16)
HGB BLD-MCNC: 9.5 G/DL (ref 12–16)
IMM GRANULOCYTES # BLD AUTO: 0.04 K/UL (ref 0–0.04)
IMM GRANULOCYTES # BLD AUTO: 0.06 K/UL (ref 0–0.04)
IMM GRANULOCYTES NFR BLD: 0.6 % (ref 0–0.4)
IMM GRANULOCYTES NFR BLD: 0.8 % (ref 0–0.4)
KETONES UR STRIP-SCNC: ABNORMAL MG/DL
LEUKOCYTE ESTERASE UR QL STRIP: NEGATIVE
LYMPHOCYTES # BLD AUTO: 0.77 K/UL (ref 1–4.8)
LYMPHOCYTES # BLD AUTO: 1.32 K/UL (ref 1–4.8)
LYMPHOCYTES NFR BLD AUTO: 10.9 % (ref 27–41)
LYMPHOCYTES NFR BLD AUTO: 18.6 % (ref 27–41)
MAGNESIUM SERPL-MCNC: 18.5 MG/DL (ref 1.7–2.3)
MAGNESIUM SERPL-MCNC: 4.6 MG/DL (ref 1.7–2.3)
MCH RBC QN AUTO: 28.5 PG (ref 27–31)
MCH RBC QN AUTO: 29.6 PG (ref 27–31)
MCHC RBC AUTO-ENTMCNC: 31.4 G/DL (ref 32–36)
MCHC RBC AUTO-ENTMCNC: 33 G/DL (ref 32–36)
MCV RBC AUTO: 89.6 FL (ref 80–96)
MCV RBC AUTO: 91 FL (ref 80–96)
MONOCYTES # BLD AUTO: 0.17 K/UL (ref 0–0.8)
MONOCYTES # BLD AUTO: 0.44 K/UL (ref 0–0.8)
MONOCYTES NFR BLD AUTO: 2.4 % (ref 2–6)
MONOCYTES NFR BLD AUTO: 6.2 % (ref 2–6)
MPC BLD CALC-MCNC: 10.5 FL (ref 9.4–12.4)
MPC BLD CALC-MCNC: 10.9 FL (ref 9.4–12.4)
NEUTROPHILS # BLD AUTO: 5.15 K/UL (ref 1.8–7.7)
NEUTROPHILS # BLD AUTO: 6.02 K/UL (ref 1.8–7.7)
NEUTROPHILS NFR BLD AUTO: 72.7 % (ref 53–65)
NEUTROPHILS NFR BLD AUTO: 85.3 % (ref 53–65)
NITRITE UR QL STRIP: NEGATIVE
NRBC # BLD AUTO: 0 X10E3/UL
NRBC # BLD AUTO: 0 X10E3/UL
NRBC, AUTO (.00): 0 %
NRBC, AUTO (.00): 0 %
PH UR STRIP: 6 PH UNITS
PLATELET # BLD AUTO: 167 K/UL (ref 150–400)
PLATELET # BLD AUTO: 179 K/UL (ref 150–400)
POTASSIUM SERPL-SCNC: 3.1 MMOL/L (ref 3.5–5.1)
POTASSIUM SERPL-SCNC: 3.6 MMOL/L (ref 3.5–5.1)
PROT SERPL-MCNC: 5.9 G/DL (ref 6.4–8.2)
PROT SERPL-MCNC: 6.3 G/DL (ref 6.4–8.2)
PROT UR QL STRIP: ABNORMAL
PROT UR-MCNC: 31 MG/DL (ref 0–11.9)
PROT/CREAT 24H UR-RTO: 0.12
RBC # BLD AUTO: 3.18 M/UL (ref 4.2–5.4)
RBC # BLD AUTO: 3.33 M/UL (ref 4.2–5.4)
RBC # UR STRIP: ABNORMAL /UL
RBC #/AREA URNS HPF: ABNORMAL /HPF
SODIUM SERPL-SCNC: 137 MMOL/L (ref 136–145)
SODIUM SERPL-SCNC: 139 MMOL/L (ref 136–145)
SP GR UR STRIP: 1.01
SQUAMOUS #/AREA URNS LPF: ABNORMAL /LPF
URATE SERPL-MCNC: 5.4 MG/DL (ref 2.6–6)
UROBILINOGEN UR STRIP-ACNC: 0.2 MG/DL
WBC # BLD AUTO: 7.06 K/UL (ref 4.5–11)
WBC # BLD AUTO: 7.08 K/UL (ref 4.5–11)
WBC #/AREA URNS HPF: ABNORMAL /HPF

## 2022-06-17 PROCEDURE — 80053 COMPREHEN METABOLIC PANEL: CPT | Performed by: OBSTETRICS & GYNECOLOGY

## 2022-06-17 PROCEDURE — 11000001 HC ACUTE MED/SURG PRIVATE ROOM

## 2022-06-17 PROCEDURE — 85025 COMPLETE CBC W/AUTO DIFF WBC: CPT | Performed by: OBSTETRICS & GYNECOLOGY

## 2022-06-17 PROCEDURE — 63600175 PHARM REV CODE 636 W HCPCS: Performed by: SPECIALIST

## 2022-06-17 PROCEDURE — 25000003 PHARM REV CODE 250: Performed by: SPECIALIST

## 2022-06-17 PROCEDURE — 84550 ASSAY OF BLOOD/URIC ACID: CPT | Performed by: OBSTETRICS & GYNECOLOGY

## 2022-06-17 PROCEDURE — 59025 FETAL NON-STRESS TEST: CPT

## 2022-06-17 PROCEDURE — 36415 COLL VENOUS BLD VENIPUNCTURE: CPT | Performed by: OBSTETRICS & GYNECOLOGY

## 2022-06-17 PROCEDURE — 99211 OFF/OP EST MAY X REQ PHY/QHP: CPT | Mod: 25

## 2022-06-17 PROCEDURE — 81001 URINALYSIS AUTO W/SCOPE: CPT | Performed by: OBSTETRICS & GYNECOLOGY

## 2022-06-17 PROCEDURE — 63600175 PHARM REV CODE 636 W HCPCS: Performed by: OBSTETRICS & GYNECOLOGY

## 2022-06-17 PROCEDURE — 84156 ASSAY OF PROTEIN URINE: CPT | Performed by: OBSTETRICS & GYNECOLOGY

## 2022-06-17 PROCEDURE — 25000003 PHARM REV CODE 250: Performed by: OBSTETRICS & GYNECOLOGY

## 2022-06-17 PROCEDURE — 83735 ASSAY OF MAGNESIUM: CPT | Performed by: SPECIALIST

## 2022-06-17 RX ORDER — NIFEDIPINE 10 MG/1
10 CAPSULE ORAL EVERY 4 HOURS
Status: DISCONTINUED | OUTPATIENT
Start: 2022-06-17 | End: 2022-06-18 | Stop reason: HOSPADM

## 2022-06-17 RX ORDER — ACETAMINOPHEN 325 MG/1
650 TABLET ORAL ONCE
Status: COMPLETED | OUTPATIENT
Start: 2022-06-17 | End: 2022-06-17

## 2022-06-17 RX ORDER — FAMOTIDINE 10 MG/ML
20 INJECTION INTRAVENOUS DAILY
Status: DISCONTINUED | OUTPATIENT
Start: 2022-06-17 | End: 2022-06-18 | Stop reason: HOSPADM

## 2022-06-17 RX ORDER — SODIUM CHLORIDE, SODIUM LACTATE, POTASSIUM CHLORIDE, CALCIUM CHLORIDE 600; 310; 30; 20 MG/100ML; MG/100ML; MG/100ML; MG/100ML
INJECTION, SOLUTION INTRAVENOUS CONTINUOUS
Status: DISCONTINUED | OUTPATIENT
Start: 2022-06-17 | End: 2022-06-17

## 2022-06-17 RX ORDER — CALCIUM GLUCONATE 98 MG/ML
1 INJECTION, SOLUTION INTRAVENOUS
Status: DISCONTINUED | OUTPATIENT
Start: 2022-06-17 | End: 2022-06-17

## 2022-06-17 RX ORDER — MAGNESIUM SULFATE HEPTAHYDRATE 40 MG/ML
4 INJECTION, SOLUTION INTRAVENOUS ONCE
Status: COMPLETED | OUTPATIENT
Start: 2022-06-17 | End: 2022-06-17

## 2022-06-17 RX ORDER — MAG HYDROX/ALUMINUM HYD/SIMETH 200-200-20
30 SUSPENSION, ORAL (FINAL DOSE FORM) ORAL EVERY 6 HOURS PRN
Status: DISCONTINUED | OUTPATIENT
Start: 2022-06-17 | End: 2022-06-18 | Stop reason: HOSPADM

## 2022-06-17 RX ORDER — HYDRALAZINE HYDROCHLORIDE 20 MG/ML
5 INJECTION INTRAMUSCULAR; INTRAVENOUS ONCE AS NEEDED
Status: DISCONTINUED | OUTPATIENT
Start: 2022-06-17 | End: 2022-06-18 | Stop reason: HOSPADM

## 2022-06-17 RX ORDER — LABETALOL HYDROCHLORIDE 5 MG/ML
20 INJECTION, SOLUTION INTRAVENOUS ONCE AS NEEDED
Status: DISCONTINUED | OUTPATIENT
Start: 2022-06-17 | End: 2022-06-18 | Stop reason: HOSPADM

## 2022-06-17 RX ORDER — MAGNESIUM SULFATE HEPTAHYDRATE 40 MG/ML
2 INJECTION, SOLUTION INTRAVENOUS CONTINUOUS
Status: DISCONTINUED | OUTPATIENT
Start: 2022-06-17 | End: 2022-06-17

## 2022-06-17 RX ORDER — CALCIUM GLUCONATE 20 MG/ML
1 INJECTION, SOLUTION INTRAVENOUS
Status: DISCONTINUED | OUTPATIENT
Start: 2022-06-17 | End: 2022-06-18

## 2022-06-17 RX ORDER — FAMOTIDINE 10 MG/ML
20 INJECTION INTRAVENOUS DAILY
Status: DISCONTINUED | OUTPATIENT
Start: 2022-06-17 | End: 2022-06-17

## 2022-06-17 RX ORDER — HYDRALAZINE HYDROCHLORIDE 20 MG/ML
10 INJECTION INTRAMUSCULAR; INTRAVENOUS ONCE AS NEEDED
Status: DISCONTINUED | OUTPATIENT
Start: 2022-06-17 | End: 2022-06-18 | Stop reason: HOSPADM

## 2022-06-17 RX ORDER — BETAMETHASONE SODIUM PHOSPHATE AND BETAMETHASONE ACETATE 3; 3 MG/ML; MG/ML
12 INJECTION, SUSPENSION INTRA-ARTICULAR; INTRALESIONAL; INTRAMUSCULAR; SOFT TISSUE
Status: COMPLETED | OUTPATIENT
Start: 2022-06-17 | End: 2022-06-18

## 2022-06-17 RX ORDER — LABETALOL HYDROCHLORIDE 5 MG/ML
40 INJECTION, SOLUTION INTRAVENOUS ONCE AS NEEDED
Status: DISCONTINUED | OUTPATIENT
Start: 2022-06-17 | End: 2022-06-18 | Stop reason: HOSPADM

## 2022-06-17 RX ORDER — NIFEDIPINE 10 MG/1
10 CAPSULE ORAL EVERY 4 HOURS
Status: DISCONTINUED | OUTPATIENT
Start: 2022-06-18 | End: 2022-06-17

## 2022-06-17 RX ORDER — CALCIUM CARBONATE 200(500)MG
1000 TABLET,CHEWABLE ORAL 3 TIMES DAILY PRN
Status: DISCONTINUED | OUTPATIENT
Start: 2022-06-17 | End: 2022-06-18 | Stop reason: HOSPADM

## 2022-06-17 RX ORDER — BUTALBITAL, ACETAMINOPHEN AND CAFFEINE 50; 325; 40 MG/1; MG/1; MG/1
1 TABLET ORAL ONCE
Status: COMPLETED | OUTPATIENT
Start: 2022-06-17 | End: 2022-06-17

## 2022-06-17 RX ORDER — LABETALOL HYDROCHLORIDE 5 MG/ML
80 INJECTION, SOLUTION INTRAVENOUS ONCE AS NEEDED
Status: DISCONTINUED | OUTPATIENT
Start: 2022-06-17 | End: 2022-06-18 | Stop reason: HOSPADM

## 2022-06-17 RX ORDER — SODIUM CHLORIDE, SODIUM LACTATE, POTASSIUM CHLORIDE, CALCIUM CHLORIDE 600; 310; 30; 20 MG/100ML; MG/100ML; MG/100ML; MG/100ML
INJECTION, SOLUTION INTRAVENOUS CONTINUOUS
Status: DISCONTINUED | OUTPATIENT
Start: 2022-06-17 | End: 2022-06-18 | Stop reason: HOSPADM

## 2022-06-17 RX ADMIN — MAGNESIUM SULFATE HEPTAHYDRATE 4 G: 40 INJECTION, SOLUTION INTRAVENOUS at 02:06

## 2022-06-17 RX ADMIN — FAMOTIDINE 20 MG: 10 INJECTION INTRAVENOUS at 02:06

## 2022-06-17 RX ADMIN — SODIUM CHLORIDE, POTASSIUM CHLORIDE, SODIUM LACTATE AND CALCIUM CHLORIDE: 600; 310; 30; 20 INJECTION, SOLUTION INTRAVENOUS at 09:06

## 2022-06-17 RX ADMIN — AMPICILLIN SODIUM 2 G: 2 INJECTION, POWDER, FOR SOLUTION INTRAVENOUS at 04:06

## 2022-06-17 RX ADMIN — AMPICILLIN SODIUM 2 G: 2 INJECTION, POWDER, FOR SOLUTION INTRAVENOUS at 10:06

## 2022-06-17 RX ADMIN — SODIUM CHLORIDE, POTASSIUM CHLORIDE, SODIUM LACTATE AND CALCIUM CHLORIDE: 600; 310; 30; 20 INJECTION, SOLUTION INTRAVENOUS at 02:06

## 2022-06-17 RX ADMIN — LABETALOL HYDROCHLORIDE 300 MG: 200 TABLET, FILM COATED ORAL at 10:06

## 2022-06-17 RX ADMIN — AMPICILLIN SODIUM 2 G: 2 INJECTION, POWDER, FOR SOLUTION INTRAVENOUS at 11:06

## 2022-06-17 RX ADMIN — ACETAMINOPHEN 650 MG: 325 TABLET ORAL at 11:06

## 2022-06-17 RX ADMIN — BUTALBITAL, ACETAMINOPHEN AND CAFFEINE 1 TABLET: 50; 325; 40 TABLET ORAL at 01:06

## 2022-06-17 RX ADMIN — NIFEDIPINE 10 MG: 10 CAPSULE ORAL at 09:06

## 2022-06-17 RX ADMIN — MAGNESIUM SULFATE IN WATER 2 G/HR: 40 INJECTION, SOLUTION INTRAVENOUS at 08:06

## 2022-06-17 RX ADMIN — ALUMINUM HYDROXIDE, MAGNESIUM HYDROXIDE, AND SIMETHICONE 30 ML: 200; 200; 20 SUSPENSION ORAL at 08:06

## 2022-06-17 RX ADMIN — LABETALOL HYDROCHLORIDE 300 MG: 100 TABLET, FILM COATED ORAL at 01:06

## 2022-06-17 RX ADMIN — LABETALOL HYDROCHLORIDE 300 MG: 200 TABLET, FILM COATED ORAL at 05:06

## 2022-06-17 RX ADMIN — CALCIUM GLUCONATE 1 G: 20 INJECTION, SOLUTION INTRAVENOUS at 10:06

## 2022-06-17 RX ADMIN — BETAMETHASONE SODIUM PHOSPHATE AND BETAMETHASONE ACETATE 12 MG: 3; 3 INJECTION, SUSPENSION INTRA-ARTICULAR; INTRALESIONAL; INTRAMUSCULAR at 03:06

## 2022-06-17 NOTE — H&P
[facility] -  Labor and Delivery  Obstetrics  History & Physical    Patient Name: Elsa Duran  MRN: [id number]  Admission Date: 2022  Primary Care Provider: Primary Doctor No    Subjective:     Principal Problem:Preexisting hypertension complicating pregnancy, antepartum, third trimester    History of Present Illness:  22  Pt of Dr Thomas    30yo  at 32w5d with chronic hypertension (on Labetalol 300mg PO TID) and Hx of migraines coming with report of  Headache, LE swelling and Lower abdominal pain - all starting around Wednesday.    Denies blurry vision  Denies RUQ or epigastric pain      Obstetric HPI:  Patient reports None contractions, active fetal movement, No vaginal bleeding , No loss of fluid     This pregnancy has been complicated by cHTH, migraines, Hx seizure disorder, maternal obesity    OB History    Para Term  AB Living   4 2 0 0 1 2   SAB IAB Ectopic Multiple Live Births   1 0 0 0 2      # Outcome Date GA Lbr Elias/2nd Weight Sex Delivery Anes PTL Lv   4 Current            3 Para 01/15/13     CS-Unspec   RADHA   2 SAB 12     SAB      1 Para 08/10/08     CS-Unspec   RADHA     Past Medical History:   Diagnosis Date    History of gonorrhea     Migraine without aura, intractable, without status migrainosus     Noncompliance with medication regimen     Syncope      Past Surgical History:   Procedure Laterality Date    Abdominal Wall Mass Surgery  2019     SECTION  08/10/2008     SECTION  01/15/2013    DILATION AND CURETTAGE OF UTERUS  2012       PTA Medications   Medication Sig    naproxen (NAPROSYN) 500 MG tablet Take 1 tablet (500 mg total) by mouth 2 (two) times daily.    promethazine (PHENERGAN) 25 MG tablet TAKE 1 TABLET BY MOUTH EVERY 8 HOURS AS NEEDED FOR MIGRAINE ...USE NO MORE THAN 3 DAYS A WEEK ..MAY CAUSE DROWSINESS-NO ALCOHOL    propranoloL (INDERAL) 60 MG tablet Take 1 tablet (60 mg total) by mouth 3 (three) times  daily.       Review of patient's allergies indicates:  No Known Allergies     Family History       Problem Relation (Age of Onset)    Hypertension Mother, Father    No Known Problems Brother, Sister, Sister, Sister          Tobacco Use    Smoking status: Current Every Day Smoker     Packs/day: 1.00     Types: Cigarettes     Start date: 2006    Smokeless tobacco: Never Used   Substance and Sexual Activity    Alcohol use: Never    Drug use: Never    Sexual activity: Yes     Partners: Male     Review of Systems   Constitutional:  Negative for chills and fever.   Eyes:  Negative for discharge and visual disturbance.   Respiratory:  Negative for cough and shortness of breath.    Cardiovascular:  Positive for leg swelling. Negative for chest pain.   Gastrointestinal:  Negative for abdominal pain, blood in stool, constipation, diarrhea, nausea and vomiting.   Genitourinary:  Negative for dysmenorrhea, dyspareunia, dysuria, flank pain, frequency, genital sores, hematuria, pelvic pain, urgency, vaginal bleeding, vaginal discharge, vaginal pain and vaginal odor.   Musculoskeletal:  Negative for back pain.   Neurological:  Positive for headaches (frontal). Negative for seizures and syncope.   Psychiatric/Behavioral:  Negative for depression. The patient is not nervous/anxious.     Objective:     Vital Signs (Most Recent):  Temp: 97.5 °F (36.4 °C) (06/17/22 0009)  Pulse: 90 (06/17/22 0201)  Resp: 18 (06/17/22 0009)  BP: (!) 199/101 (06/17/22 0201)  SpO2: 98 % (06/17/22 0009)   Vital Signs (24h Range):  Temp:  [97.5 °F (36.4 °C)] 97.5 °F (36.4 °C)  Pulse:  [75-96] 90  Resp:  [18] 18  SpO2:  [98 %] 98 %  BP: (134-199)/() 199/101     Weight: 109.2 kg (240 lb 12.8 oz)  Body mass index is 38.87 kg/m².    FHT: 130 Cat 1   TOCO:  Q no minutes    Physical Exam:   Constitutional: She is oriented to person, place, and time. She appears well-developed and well-nourished.    HENT:   Head: Normocephalic and atraumatic.    Eyes:  Pupils are equal, round, and reactive to light.     Cardiovascular:  Normal rate and regular rhythm.      Exam reveals no edema.        Pulmonary/Chest: Effort normal and breath sounds normal. No respiratory distress.        Abdominal: Soft. Bowel sounds are normal. She exhibits no distension and no mass. There is no abdominal tenderness. There is no rebound and no guarding.     Genitourinary:    Pelvic exam was performed with patient supine.   The external female genitalia was normal.   No external genitalia lesions identified,Genitalia hair distrobution normal .   No  no vaginal discharge in the vagina.           Musculoskeletal: Normal range of motion.       Neurological: She is alert and oriented to person, place, and time. She has normal reflexes. She displays normal reflexes. She exhibits normal muscle tone.   No clonus    Skin: Skin is warm and dry. No rash noted. No erythema.    Psychiatric: She has a normal mood and affect. Her behavior is normal.          Significant Labs:  No results found for: GROUPTRH, HEPBSAG, RUBELLAIGGSC, STREPBCULT, AFP, [id number]    I have personallly reviewed all pertinent lab results from the last 24 hours.  Recent Lab Results         06/17/22  0109   06/17/22  0040   06/17/22  0017        Albumin/Globulin Ratio   0.8         Albumin   2.6         Alkaline Phosphatase   105         ALT   30         Anion Gap   10         Appearance, UA     Clear       AST   20         Bacteria, UA     Few       Baso #   0.02         Basophil %   0.3         Bilirubin (UA)     Negative       BILIRUBIN TOTAL   0.3         BUN   10         BUN/CREAT RATIO   15         Calcium   8.5         Chloride   106         CO2   26         Color, UA     Yellow       Creatinine   0.68         Creatinine, Urine 257           Differential Type   Auto         eGFR if non    107         Eos #   0.11         Eosinophil %   1.6         Globulin, Total   3.3         Glucose   130         Glucose, UA      Negative       Hematocrit   28.5         Hemoglobin   9.4         Immature Grans (Abs)   0.04         Immature Granulocytes   0.6         Ketones, UA     Trace       Leukocytes, UA     Negative       Lymph #   1.32         Lymph %   18.6         MCH   29.6         MCHC   33.0         MCV   89.6         Mono #   0.44         Mono %   6.2         MPV   10.9         Neutrophils, Abs   5.15         Neutrophils Relative   72.7         NITRITE UA     Negative       nRBC   0.0         NUCLEATED RBC ABSOLUTE   0.00         Occult Blood UA     Trace-Lysed       pH, UA     6.0       Platelets   179         Potassium   3.1         PROTEIN TOTAL   5.9         Protein, UA     Trace       Protein, Urine Random 31.0           Protein/Creat Ratio 0.121           RBC   3.18         RBC, UA     0-3       RDW   13.8         Sodium   139         Specific [place], UA     1.015       Squam Epithel, UA     Few       Uric Acid   5.4         UROBILINOGEN UA     0.2       WBC, UA     0-5       WBC   7.08               Assessment/Plan:     31 y.o. female  at 32w5d for:    * Preexisting hypertension complicating pregnancy, antepartum, third trimester    Did not take evening dose of Labetalol - given in OB Triage    Chronic hypertension with superimposed preeclampsia    PIH labs within normal limits but blood pressures trending upwards    Admit  PIH labs  Hypertension protocol  Betamethasone for fetal lung maturity  Magnesium for seizure prophylaxis    Discussed with Dr Youngever      32 weeks gestation of pregnancy    See below        Galen Fraire MD  Obstetrics  [facility] -  Labor and Delivery

## 2022-06-17 NOTE — ASSESSMENT & PLAN NOTE
PIH labs within normal limits but blood pressures trending upwards    Admit  PIH labs  Hypertension protocol  Betamethasone for fetal lung maturity  Magnesium for seizure prophylaxis    Discussed with Dr Thomas

## 2022-06-17 NOTE — HPI
22  Pt of Dr Thomas    30yo  at 32w5d with chronic hypertension (on Labetalol 300mg PO TID) and Hx of migraines coming with report of  Headache, LE swelling and Lower abdominal pain - all starting around Wednesday.    Denies blurry vision  Denies RUQ or epigastric pain

## 2022-06-17 NOTE — SUBJECTIVE & OBJECTIVE
Obstetric HPI:  Patient reports None contractions, active fetal movement, No vaginal bleeding , No loss of fluid     This pregnancy has been complicated by cHTH, migraines, Hx seizure disorder, maternal obesity    OB History    Para Term  AB Living   4 2 0 0 1 2   SAB IAB Ectopic Multiple Live Births   1 0 0 0 2      # Outcome Date GA Lbr Elias/2nd Weight Sex Delivery Anes PTL Lv   4 Current            3 Para 01/15/13     CS-Unspec   RADHA   2 SAB 12     SAB      1 Para 08/10/08     CS-Unspec   RADHA     Past Medical History:   Diagnosis Date    History of gonorrhea     Migraine without aura, intractable, without status migrainosus     Noncompliance with medication regimen     Syncope      Past Surgical History:   Procedure Laterality Date    Abdominal Wall Mass Surgery  2019     SECTION  08/10/2008     SECTION  01/15/2013    DILATION AND CURETTAGE OF UTERUS  2012       PTA Medications   Medication Sig    naproxen (NAPROSYN) 500 MG tablet Take 1 tablet (500 mg total) by mouth 2 (two) times daily.    promethazine (PHENERGAN) 25 MG tablet TAKE 1 TABLET BY MOUTH EVERY 8 HOURS AS NEEDED FOR MIGRAINE ...USE NO MORE THAN 3 DAYS A WEEK ..MAY CAUSE DROWSINESS-NO ALCOHOL    propranoloL (INDERAL) 60 MG tablet Take 1 tablet (60 mg total) by mouth 3 (three) times daily.       Review of patient's allergies indicates:  No Known Allergies     Family History       Problem Relation (Age of Onset)    Hypertension Mother, Father    No Known Problems Brother, Sister, Sister, Sister          Tobacco Use    Smoking status: Current Every Day Smoker     Packs/day: 1.00     Types: Cigarettes     Start date:     Smokeless tobacco: Never Used   Substance and Sexual Activity    Alcohol use: Never    Drug use: Never    Sexual activity: Yes     Partners: Male     Review of Systems   Constitutional:  Negative for chills and fever.   Eyes:  Negative for discharge and visual disturbance.   Respiratory:   Negative for cough and shortness of breath.    Cardiovascular:  Positive for leg swelling. Negative for chest pain.   Gastrointestinal:  Negative for abdominal pain, blood in stool, constipation, diarrhea, nausea and vomiting.   Genitourinary:  Negative for dysmenorrhea, dyspareunia, dysuria, flank pain, frequency, genital sores, hematuria, pelvic pain, urgency, vaginal bleeding, vaginal discharge, vaginal pain and vaginal odor.   Musculoskeletal:  Negative for back pain.   Neurological:  Positive for headaches (frontal). Negative for seizures and syncope.   Psychiatric/Behavioral:  Negative for depression. The patient is not nervous/anxious.     Objective:     Vital Signs (Most Recent):  Temp: 97.5 °F (36.4 °C) (06/17/22 0009)  Pulse: 90 (06/17/22 0201)  Resp: 18 (06/17/22 0009)  BP: (!) 199/101 (06/17/22 0201)  SpO2: 98 % (06/17/22 0009)   Vital Signs (24h Range):  Temp:  [97.5 °F (36.4 °C)] 97.5 °F (36.4 °C)  Pulse:  [75-96] 90  Resp:  [18] 18  SpO2:  [98 %] 98 %  BP: (134-199)/() 199/101     Weight: 109.2 kg (240 lb 12.8 oz)  Body mass index is 38.87 kg/m².    FHT: 130 Cat 1   TOCO:  Q no minutes    Physical Exam:   Constitutional: She is oriented to person, place, and time. She appears well-developed and well-nourished.    HENT:   Head: Normocephalic and atraumatic.    Eyes: Pupils are equal, round, and reactive to light.     Cardiovascular:  Normal rate and regular rhythm.      Exam reveals no edema.        Pulmonary/Chest: Effort normal and breath sounds normal. No respiratory distress.        Abdominal: Soft. Bowel sounds are normal. She exhibits no distension and no mass. There is no abdominal tenderness. There is no rebound and no guarding.     Genitourinary:    Pelvic exam was performed with patient supine.   The external female genitalia was normal.   No external genitalia lesions identified,Genitalia hair distrobution normal .   No  no vaginal discharge in the vagina.           Musculoskeletal:  Normal range of motion.       Neurological: She is alert and oriented to person, place, and time. She has normal reflexes. She displays normal reflexes. She exhibits normal muscle tone.   No clonus    Skin: Skin is warm and dry. No rash noted. No erythema.    Psychiatric: She has a normal mood and affect. Her behavior is normal.          Significant Labs:  No results found for: GROUPTRH, HEPBSAG, RUBELLAIGGSC, STREPBCULT, AFP, [id number]    I have personallly reviewed all pertinent lab results from the last 24 hours.  Recent Lab Results         06/17/22  0109   06/17/22  0040   06/17/22  0017        Albumin/Globulin Ratio   0.8         Albumin   2.6         Alkaline Phosphatase   105         ALT   30         Anion Gap   10         Appearance, UA     Clear       AST   20         Bacteria, UA     Few       Baso #   0.02         Basophil %   0.3         Bilirubin (UA)     Negative       BILIRUBIN TOTAL   0.3         BUN   10         BUN/CREAT RATIO   15         Calcium   8.5         Chloride   106         CO2   26         Color, UA     Yellow       Creatinine   0.68         Creatinine, Urine 257           Differential Type   Auto         eGFR if non    107         Eos #   0.11         Eosinophil %   1.6         Globulin, Total   3.3         Glucose   130         Glucose, UA     Negative       Hematocrit   28.5         Hemoglobin   9.4         Immature Grans (Abs)   0.04         Immature Granulocytes   0.6         Ketones, UA     Trace       Leukocytes, UA     Negative       Lymph #   1.32         Lymph %   18.6         MCH   29.6         MCHC   33.0         MCV   89.6         Mono #   0.44         Mono %   6.2         MPV   10.9         Neutrophils, Abs   5.15         Neutrophils Relative   72.7         NITRITE UA     Negative       nRBC   0.0         NUCLEATED RBC ABSOLUTE   0.00         Occult Blood UA     Trace-Lysed       pH, UA     6.0       Platelets   179         Potassium   3.1         PROTEIN  TOTAL   5.9         Protein, UA     Trace       Protein, Urine Random 31.0           Protein/Creat Ratio 0.121           RBC   3.18         RBC, UA     0-3       RDW   13.8         Sodium   139         Specific [place], UA     1.015       Squam Epithel, UA     Few       Uric Acid   5.4         UROBILINOGEN UA     0.2       WBC, UA     0-5       WBC   7.08

## 2022-06-17 NOTE — HOSPITAL COURSE
Recent Results (from the past 24 hour(s))   Urinalysis, Reflex to Urine Culture    Collection Time: 06/17/22 12:17 AM    Specimen: Urine   Result Value Ref Range    Color, UA Yellow Colorless, Straw, Yellow, Dark Yellow    Clarity, UA Clear Clear    pH, UA 6.0 5.0, 5.5, 6.0, 6.5, 7.0, 7.5, 8.0 pH Units    Leukocytes, UA Negative Negative    Nitrites, UA Negative Negative    Protein, UA Trace (A) Negative    Glucose, UA Negative Negative mg/dL    Ketones, UA Trace Negative, Trace mg/dL    Urobilinogen, UA 0.2 0.2, 1.0 mg/dL    Bilirubin, UA Negative Negative    Blood, UA Trace-Lysed (A) Negative    Specific Gravity, UA 1.015 <=1.005, 1.010, 1.015, 1.020, 1.025, 1.030   Urinalysis, Microscopic    Collection Time: 06/17/22 12:17 AM   Result Value Ref Range    WBC, UA 0-5 None Seen, 0-5 /hpf    RBC, UA 0-3 None Seen, 0-3 /hpf    Bacteria, UA Few (A) None Seen, None Seen To Occasional, Rare /hpf    Squamous Epithelial Cells, UA Few (A) None Seen, Rare, None Seen To Occasional /lpf   Comprehensive Metabolic Panel    Collection Time: 06/17/22 12:40 AM   Result Value Ref Range    Sodium 139 136 - 145 mmol/L    Potassium 3.1 (L) 3.5 - 5.1 mmol/L    Chloride 106 98 - 107 mmol/L    CO2 26 21 - 32 mmol/L    Anion Gap 10 7 - 16 mmol/L    Glucose 130 (H) 74 - 106 mg/dL    BUN 10 7 - 18 mg/dL    Creatinine 0.68 0.55 - 1.02 mg/dL    BUN/Creatinine Ratio 15 6 - 20    Calcium 8.5 8.5 - 10.1 mg/dL    Total Protein 5.9 (L) 6.4 - 8.2 g/dL    Albumin 2.6 (L) 3.5 - 5.0 g/dL    Globulin 3.3 2.0 - 4.0 g/dL    A/G Ratio 0.8     Bilirubin, Total 0.3 0.0 - 1.2 mg/dL    Alk Phos 105 (H) 37 - 98 U/L    ALT 30 13 - 56 U/L    AST 20 15 - 37 U/L    eGFR 107 >=60 mL/min/1.73m²   Uric Acid    Collection Time: 06/17/22 12:40 AM   Result Value Ref Range    Uric Acid 5.4 2.6 - 6.0 mg/dL   CBC with Differential    Collection Time: 06/17/22 12:40 AM   Result Value Ref Range    WBC 7.08 4.50 - 11.00 K/uL    RBC 3.18 (L) 4.20 - 5.40 M/uL    Hemoglobin 9.4  (L) 12.0 - 16.0 g/dL    Hematocrit 28.5 (L) 38.0 - 47.0 %    MCV 89.6 80.0 - 96.0 fL    MCH 29.6 27.0 - 31.0 pg    MCHC 33.0 32.0 - 36.0 g/dL    RDW 13.8 11.5 - 14.5 %    Platelet Count 179 150 - 400 K/uL    MPV 10.9 9.4 - 12.4 fL    Neutrophils % 72.7 (H) 53.0 - 65.0 %    Lymphocytes % 18.6 (L) 27.0 - 41.0 %    Monocytes % 6.2 (H) 2.0 - 6.0 %    Eosinophils % 1.6 1.0 - 4.0 %    Basophils % 0.3 0.0 - 1.0 %    Immature Granulocytes % 0.6 (H) 0.0 - 0.4 %    nRBC, Auto 0.0 <=0.0 %    Neutrophils, Abs 5.15 1.80 - 7.70 K/uL    Lymphocytes, Absolute 1.32 1.00 - 4.80 K/uL    Monocytes, Absolute 0.44 0.00 - 0.80 K/uL    Eosinophils, Absolute 0.11 0.00 - 0.50 K/uL    Basophils, Absolute 0.02 0.00 - 0.20 K/uL    Immature Granulocytes, Absolute 0.04 0.00 - 0.04 K/uL    nRBC, Absolute 0.00 <=0.00 x10e3/uL    Diff Type Auto    Protein/Creatinine Ratio, Urine    Collection Time: 06/17/22  1:09 AM   Result Value Ref Range    Creatinine, Urine 257 (H) 28 - 219 mg/dL    Protein, Urine 31.0 (H) 0.0 - 11.9 mg/dL    Protein/Creatinine Ratio 0.121         NST: Reactive    OBUS (Prelim) : BPP 8/8, ASA 14

## 2022-06-18 VITALS
BODY MASS INDEX: 39.02 KG/M2 | HEIGHT: 66 IN | OXYGEN SATURATION: 98 % | RESPIRATION RATE: 18 BRPM | DIASTOLIC BLOOD PRESSURE: 74 MMHG | SYSTOLIC BLOOD PRESSURE: 123 MMHG | HEART RATE: 98 BPM | TEMPERATURE: 98 F | WEIGHT: 242.81 LBS

## 2022-06-18 LAB
CREAT 24H UR-MRATE: 858 MG/24 HR (ref 740–1570)
CREAT UR-MCNC: 22 MG/DL (ref 28–219)
PROT 24H UR-MCNC: 534 MG/24 HR (ref 0–150)
PROT UR-MCNC: 13.7 MG/DL (ref 0–11.9)
TOTAL VOLUME, URINE (ML): 3900 ML
TOTAL VOLUME, URINE (ML): 3900 ML

## 2022-06-18 PROCEDURE — 63600175 PHARM REV CODE 636 W HCPCS: Performed by: OBSTETRICS & GYNECOLOGY

## 2022-06-18 PROCEDURE — 63600175 PHARM REV CODE 636 W HCPCS: Performed by: SPECIALIST

## 2022-06-18 PROCEDURE — 25000003 PHARM REV CODE 250: Performed by: OBSTETRICS & GYNECOLOGY

## 2022-06-18 PROCEDURE — 82570 ASSAY OF URINE CREATININE: CPT | Performed by: SPECIALIST

## 2022-06-18 PROCEDURE — 84156 ASSAY OF PROTEIN URINE: CPT | Performed by: SPECIALIST

## 2022-06-18 PROCEDURE — 25000003 PHARM REV CODE 250: Performed by: SPECIALIST

## 2022-06-18 RX ORDER — ACETAMINOPHEN 500 MG
1000 TABLET ORAL EVERY 6 HOURS PRN
Status: DISCONTINUED | OUTPATIENT
Start: 2022-06-18 | End: 2022-06-18 | Stop reason: HOSPADM

## 2022-06-18 RX ADMIN — ACETAMINOPHEN 1000 MG: 500 TABLET ORAL at 04:06

## 2022-06-18 RX ADMIN — FAMOTIDINE 20 MG: 10 INJECTION INTRAVENOUS at 10:06

## 2022-06-18 RX ADMIN — NIFEDIPINE 10 MG: 10 CAPSULE ORAL at 05:06

## 2022-06-18 RX ADMIN — NIFEDIPINE 10 MG: 10 CAPSULE ORAL at 01:06

## 2022-06-18 RX ADMIN — BETAMETHASONE SODIUM PHOSPHATE AND BETAMETHASONE ACETATE 12 MG: 3; 3 INJECTION, SUSPENSION INTRA-ARTICULAR; INTRALESIONAL; INTRAMUSCULAR at 02:06

## 2022-06-18 RX ADMIN — AMPICILLIN SODIUM 2 G: 2 INJECTION, POWDER, FOR SOLUTION INTRAVENOUS at 05:06

## 2022-06-18 RX ADMIN — LABETALOL HYDROCHLORIDE 300 MG: 200 TABLET, FILM COATED ORAL at 02:06

## 2022-06-18 RX ADMIN — NIFEDIPINE 10 MG: 10 CAPSULE ORAL at 10:06

## 2022-06-18 RX ADMIN — LABETALOL HYDROCHLORIDE 300 MG: 200 TABLET, FILM COATED ORAL at 10:06

## 2022-06-18 NOTE — DISCHARGE SUMMARY
The patient is ready for discharge.  She has been normotensive on labetalol 300 mg q.8 hours.  She is also on Procardia 10 mg Q 4 hours for  labor.  She for hours she will be continued on this at home and will follow up in the office next week.  Please see [place] discharge form

## 2022-11-07 ENCOUNTER — HOSPITAL ENCOUNTER (EMERGENCY)
Facility: HOSPITAL | Age: 32
Discharge: HOME | End: 2022-11-07
Payer: MEDICAID

## 2022-11-07 VITALS
DIASTOLIC BLOOD PRESSURE: 96 MMHG | BODY MASS INDEX: 32.14 KG/M2 | HEIGHT: 66 IN | WEIGHT: 200 LBS | RESPIRATION RATE: 18 BRPM | TEMPERATURE: 99 F | OXYGEN SATURATION: 100 % | SYSTOLIC BLOOD PRESSURE: 152 MMHG | HEART RATE: 79 BPM

## 2022-11-07 DIAGNOSIS — J06.9 VIRAL URI WITH COUGH: Primary | ICD-10-CM

## 2022-11-07 LAB
FLUAV AG UPPER RESP QL IA.RAPID: NEGATIVE
FLUBV AG UPPER RESP QL IA.RAPID: NEGATIVE

## 2022-11-07 PROCEDURE — 99284 EMERGENCY DEPT VISIT MOD MDM: CPT

## 2022-11-07 PROCEDURE — 87804 INFLUENZA ASSAY W/OPTIC: CPT | Performed by: NURSE PRACTITIONER

## 2022-11-07 PROCEDURE — 99283 EMERGENCY DEPT VISIT LOW MDM: CPT | Mod: ,,, | Performed by: NURSE PRACTITIONER

## 2022-11-07 PROCEDURE — 99283 PR EMERGENCY DEPT VISIT,LEVEL III: ICD-10-PCS | Mod: ,,, | Performed by: NURSE PRACTITIONER

## 2022-11-07 RX ORDER — CHLORPHENIRAMINE MALEATE AND PHENYLEPHRINE HYDROCHLORIDE 4; 10 MG/1; MG/1
1 TABLET, COATED ORAL EVERY 6 HOURS PRN
Qty: 12 TABLET | Refills: 0 | Status: SHIPPED | OUTPATIENT
Start: 2022-11-07 | End: 2022-11-17

## 2022-11-07 RX ORDER — BENZONATATE 100 MG/1
100 CAPSULE ORAL 3 TIMES DAILY PRN
Qty: 20 CAPSULE | Refills: 0 | Status: SHIPPED | OUTPATIENT
Start: 2022-11-07 | End: 2022-11-17

## 2022-11-07 NOTE — Clinical Note
"Elsa"LUIS Duran was seen and treated in our emergency department on 11/7/2022.  She may return to work on 11/09/2022.       If you have any questions or concerns, please don't hesitate to call.      NICKOLAS Shah"

## 2022-11-08 NOTE — DISCHARGE INSTRUCTIONS
Take prescriptions as directed. Alternate tylenol and ibuprofen as needed for pain. Ensure you are drinking plenty of fluids, especially water. Follow up with your primary care provider as needed. Return to the ED for worsening signs and symptoms or otherwise as needed.

## 2022-11-08 NOTE — ED PROVIDER NOTES
Encounter Date: 2022       History     Chief Complaint   Patient presents with    Cough     The history is provided by the patient.   URI  The primary symptoms include cough. Primary symptoms do not include fever, fatigue, headaches, ear pain, sore throat, swollen glands, wheezing, abdominal pain, nausea, vomiting, myalgias, arthralgias or rash. The current episode started today. This is a new problem. The problem has not changed since onset.  The cough began today. The cough is non-productive.   The onset of the illness is associated with exposure to sick contacts (concerned she has the flu, wants to be checked). Symptoms associated with the illness include congestion and rhinorrhea. The illness is not associated with chills, plugged ear sensation, facial pain or sinus pressure. The following treatments were addressed: Acetaminophen was not tried. A decongestant was not tried. Aspirin was not tried. NSAIDs were not tried.   Review of patient's allergies indicates:  No Known Allergies  Past Medical History:   Diagnosis Date    History of gonorrhea     Migraine without aura, intractable, without status migrainosus     Noncompliance with medication regimen     Syncope      Past Surgical History:   Procedure Laterality Date    Abdominal Wall Mass Surgery  2019     SECTION  08/10/2008     SECTION  01/15/2013    DILATION AND CURETTAGE OF UTERUS  2012     Family History   Problem Relation Age of Onset    Hypertension Mother     Hypertension Father     No Known Problems Brother     No Known Problems Sister     No Known Problems Sister     No Known Problems Sister      Social History     Tobacco Use    Smoking status: Every Day     Packs/day: 1.00     Types: Cigarettes     Start date:     Smokeless tobacco: Never   Substance Use Topics    Alcohol use: Never    Drug use: Never     Review of Systems   Constitutional:  Negative for chills, fatigue and fever.   HENT:  Positive for congestion  and rhinorrhea. Negative for ear pain, sinus pressure and sore throat.    Respiratory:  Positive for cough. Negative for shortness of breath and wheezing.    Cardiovascular:  Negative for chest pain.   Gastrointestinal:  Negative for abdominal pain, nausea and vomiting.   Musculoskeletal:  Negative for arthralgias and myalgias.   Skin:  Negative for rash.   Neurological:  Negative for headaches.   All other systems reviewed and are negative.    Physical Exam     Initial Vitals [11/07/22 2143]   BP Pulse Resp Temp SpO2   (!) 152/96 79 18 99 °F (37.2 °C) 100 %      MAP       --         Physical Exam    Constitutional: She appears well-developed and well-nourished. She is cooperative.   Cardiovascular:  Normal rate, regular rhythm, normal heart sounds and normal pulses.           Pulmonary/Chest: Effort normal and breath sounds normal. No respiratory distress.   Abdominal: Abdomen is soft. Bowel sounds are normal. There is no abdominal tenderness.     Neurological: She is alert and oriented to person, place, and time.   Skin: Skin is warm, dry and intact. Capillary refill takes less than 2 seconds.   Psychiatric: She has a normal mood and affect. Her speech is normal and behavior is normal. Judgment and thought content normal. Cognition and memory are normal.       Medical Screening Exam   See Full Note    ED Course   Procedures  Labs Reviewed   RAPID INFLUENZA A/B - Normal          Imaging Results    None          Medications - No data to display                Counseled on supportive measures. Warning s/s discussed and return precautions given; the patient has v/u.      Clinical Impression:   Final diagnoses:  [J06.9] Viral URI with cough (Primary)        ED Disposition Condition    Discharge Stable          ED Prescriptions       Medication Sig Dispense Start Date End Date Auth. Provider    chlorpheniramine-phenylephrine (ED A-HIST) 4-10 mg per tablet Take 1 tablet by mouth every 6 (six) hours as needed for  Congestion. 12 tablet 11/7/2022 11/17/2022 NICKOLAS Shah    benzonatate (TESSALON) 100 MG capsule Take 1 capsule (100 mg total) by mouth 3 (three) times daily as needed for Cough. 20 capsule 11/7/2022 11/17/2022 NICKOLAS Shah          Follow-up Information       Follow up With Specialties Details Why Contact Info    Primary Care Provider   As needed              NICKOLAS Shah  11/07/22 2762

## 2022-11-17 ENCOUNTER — HOSPITAL ENCOUNTER (EMERGENCY)
Facility: HOSPITAL | Age: 32
Discharge: HOME | End: 2022-11-17
Payer: MEDICAID

## 2022-11-17 VITALS
HEIGHT: 66 IN | BODY MASS INDEX: 31.5 KG/M2 | HEART RATE: 101 BPM | RESPIRATION RATE: 16 BRPM | DIASTOLIC BLOOD PRESSURE: 82 MMHG | WEIGHT: 196 LBS | SYSTOLIC BLOOD PRESSURE: 132 MMHG | TEMPERATURE: 99 F | OXYGEN SATURATION: 99 %

## 2022-11-17 DIAGNOSIS — J06.9 VIRAL URI WITH COUGH: Primary | ICD-10-CM

## 2022-11-17 PROCEDURE — 99284 EMERGENCY DEPT VISIT MOD MDM: CPT

## 2022-11-17 PROCEDURE — 99284 EMERGENCY DEPT VISIT MOD MDM: CPT | Mod: ,,, | Performed by: NURSE PRACTITIONER

## 2022-11-17 PROCEDURE — 99284 PR EMERGENCY DEPT VISIT,LEVEL IV: ICD-10-PCS | Mod: ,,, | Performed by: NURSE PRACTITIONER

## 2022-11-17 RX ORDER — AZITHROMYCIN 250 MG/1
250 TABLET, FILM COATED ORAL DAILY
Qty: 6 TABLET | Refills: 0 | Status: SHIPPED | OUTPATIENT
Start: 2022-11-17 | End: 2023-01-30 | Stop reason: CLARIF

## 2022-11-17 RX ORDER — BENZONATATE 100 MG/1
100 CAPSULE ORAL 3 TIMES DAILY PRN
Qty: 30 CAPSULE | Refills: 0 | Status: SHIPPED | OUTPATIENT
Start: 2022-11-17 | End: 2023-01-30 | Stop reason: CLARIF

## 2022-11-17 NOTE — ED PROVIDER NOTES
Encounter Date: 2022       History     Chief Complaint   Patient presents with    Cough     32 year old female presents to the emergency department to be evaluated for runny nose and cough that began two weeks ago. Denies any fever, Chest pain, shortness of breath. Her cough has been productive with yellow sputum. Her daughter has similar symptoms.      The history is provided by the patient.   Cough  This is a new problem. The current episode started several weeks ago. Associated symptoms include rhinorrhea. Pertinent negatives include no chest pain, no chills, no sweats, no weight loss, no ear congestion, no ear pain, no headaches, no sore throat, no myalgias, no shortness of breath, no wheezing and no eye redness.   Review of patient's allergies indicates:  No Known Allergies  Past Medical History:   Diagnosis Date    History of gonorrhea     Migraine without aura, intractable, without status migrainosus     Noncompliance with medication regimen     Syncope      Past Surgical History:   Procedure Laterality Date    Abdominal Wall Mass Surgery  2019     SECTION  08/10/2008     SECTION  01/15/2013    DILATION AND CURETTAGE OF UTERUS  2012     Family History   Problem Relation Age of Onset    Hypertension Mother     Hypertension Father     No Known Problems Brother     No Known Problems Sister     No Known Problems Sister     No Known Problems Sister      Social History     Tobacco Use    Smoking status: Every Day     Packs/day: 1.00     Types: Cigarettes     Start date:     Smokeless tobacco: Never   Substance Use Topics    Alcohol use: Never    Drug use: Never     Review of Systems   Constitutional:  Negative for chills and weight loss.   HENT:  Positive for rhinorrhea. Negative for ear pain and sore throat.    Eyes:  Negative for redness.   Respiratory:  Positive for cough. Negative for shortness of breath and wheezing.    Cardiovascular:  Negative for chest pain.    Musculoskeletal:  Negative for myalgias.   Neurological:  Negative for headaches.   All other systems reviewed and are negative.    Physical Exam     Initial Vitals [11/17/22 1001]   BP Pulse Resp Temp SpO2   132/82 101 16 99.4 °F (37.4 °C) 99 %      MAP       --         Physical Exam    Vitals reviewed.  Constitutional: She appears well-developed and well-nourished.   HENT:   Right Ear: Tympanic membrane normal.   Left Ear: Tympanic membrane normal.   Mouth/Throat: Oropharynx is clear and moist and mucous membranes are normal.   Neck: Neck supple.   Cardiovascular:  Normal rate and regular rhythm.           Pulmonary/Chest: Breath sounds normal.   Abdominal: Abdomen is soft. Bowel sounds are normal. She exhibits no distension and no mass. There is no abdominal tenderness. There is no rebound and no guarding.   Musculoskeletal:         General: Normal range of motion.      Cervical back: Neck supple.     Neurological: She is alert and oriented to person, place, and time. She has normal strength. GCS score is 15. GCS eye subscore is 4. GCS verbal subscore is 5. GCS motor subscore is 6.   Skin: Skin is warm and dry. Capillary refill takes less than 2 seconds.   Psychiatric: She has a normal mood and affect.       Medical Screening Exam   See Full Note    ED Course   Procedures  Labs Reviewed - No data to display       Imaging Results    None          Medications - No data to display                    Clinical Impression:   Final diagnoses:  [J06.9] Viral URI with cough (Primary)        ED Disposition Condition    Discharge Stable          ED Prescriptions       Medication Sig Dispense Start Date End Date Auth. Provider    benzonatate (TESSALON) 100 MG capsule Take 1 capsule (100 mg total) by mouth 3 (three) times daily as needed for Cough. 30 capsule 11/17/2022 -- NICKOLAS Wright    azithromycin (Z-SUSAN) 250 MG tablet Take 1 tablet (250 mg total) by mouth once daily. Take first 2 tablets together, then 1  every day until finished. 6 tablet 11/17/2022 -- NICKOLAS Wright          Follow-up Information    None          NICKOLAS Wright  11/17/22 104

## 2022-12-05 ENCOUNTER — HOSPITAL ENCOUNTER (EMERGENCY)
Facility: HOSPITAL | Age: 32
Discharge: HOME | End: 2022-12-05
Payer: MEDICAID

## 2022-12-05 VITALS
OXYGEN SATURATION: 100 % | WEIGHT: 194.5 LBS | SYSTOLIC BLOOD PRESSURE: 176 MMHG | HEART RATE: 90 BPM | HEIGHT: 66 IN | DIASTOLIC BLOOD PRESSURE: 104 MMHG | BODY MASS INDEX: 31.26 KG/M2 | RESPIRATION RATE: 18 BRPM | TEMPERATURE: 98 F

## 2022-12-05 DIAGNOSIS — K59.00 CONSTIPATION: ICD-10-CM

## 2022-12-05 LAB
ANION GAP SERPL CALCULATED.3IONS-SCNC: 10 MMOL/L (ref 7–16)
B-HCG UR QL: NEGATIVE
BASOPHILS # BLD AUTO: 0.04 K/UL (ref 0–0.2)
BASOPHILS NFR BLD AUTO: 1.2 % (ref 0–1)
BUN SERPL-MCNC: 8 MG/DL (ref 7–18)
BUN/CREAT SERPL: 11 (ref 6–20)
CALCIUM SERPL-MCNC: 8.7 MG/DL (ref 8.5–10.1)
CHLORIDE SERPL-SCNC: 104 MMOL/L (ref 98–107)
CO2 SERPL-SCNC: 30 MMOL/L (ref 21–32)
CREAT SERPL-MCNC: 0.74 MG/DL (ref 0.55–1.02)
CTP QC/QA: YES
DIFFERENTIAL METHOD BLD: ABNORMAL
EGFR (NO RACE VARIABLE) (RUSH/TITUS): 110 ML/MIN/1.73M²
EOSINOPHIL # BLD AUTO: 0.06 K/UL (ref 0–0.5)
EOSINOPHIL NFR BLD AUTO: 1.8 % (ref 1–4)
ERYTHROCYTE [DISTWIDTH] IN BLOOD BY AUTOMATED COUNT: 17.2 % (ref 11.5–14.5)
GLUCOSE SERPL-MCNC: 81 MG/DL (ref 74–106)
HCT VFR BLD AUTO: 34.8 % (ref 38–47)
HGB BLD-MCNC: 10.6 G/DL (ref 12–16)
IMM GRANULOCYTES # BLD AUTO: 0 K/UL (ref 0–0.04)
IMM GRANULOCYTES NFR BLD: 0 % (ref 0–0.4)
LYMPHOCYTES # BLD AUTO: 1.47 K/UL (ref 1–4.8)
LYMPHOCYTES NFR BLD AUTO: 44.3 % (ref 27–41)
MCH RBC QN AUTO: 24.6 PG (ref 27–31)
MCHC RBC AUTO-ENTMCNC: 30.5 G/DL (ref 32–36)
MCV RBC AUTO: 80.7 FL (ref 80–96)
MONOCYTES # BLD AUTO: 0.3 K/UL (ref 0–0.8)
MONOCYTES NFR BLD AUTO: 9 % (ref 2–6)
MPC BLD CALC-MCNC: 10.2 FL (ref 9.4–12.4)
NEUTROPHILS # BLD AUTO: 1.45 K/UL (ref 1.8–7.7)
NEUTROPHILS NFR BLD AUTO: 43.7 % (ref 53–65)
NRBC # BLD AUTO: 0 X10E3/UL
NRBC, AUTO (.00): 0 %
PLATELET # BLD AUTO: 286 K/UL (ref 150–400)
POTASSIUM SERPL-SCNC: 3.8 MMOL/L (ref 3.5–5.1)
RBC # BLD AUTO: 4.31 M/UL (ref 4.2–5.4)
SODIUM SERPL-SCNC: 140 MMOL/L (ref 136–145)
WBC # BLD AUTO: 3.32 K/UL (ref 4.5–11)

## 2022-12-05 PROCEDURE — 36415 COLL VENOUS BLD VENIPUNCTURE: CPT | Performed by: NURSE PRACTITIONER

## 2022-12-05 PROCEDURE — 80048 BASIC METABOLIC PNL TOTAL CA: CPT | Performed by: NURSE PRACTITIONER

## 2022-12-05 PROCEDURE — 85025 COMPLETE CBC W/AUTO DIFF WBC: CPT | Performed by: NURSE PRACTITIONER

## 2022-12-05 PROCEDURE — 99284 EMERGENCY DEPT VISIT MOD MDM: CPT | Mod: ,,, | Performed by: NURSE PRACTITIONER

## 2022-12-05 PROCEDURE — 81025 URINE PREGNANCY TEST: CPT | Performed by: NURSE PRACTITIONER

## 2022-12-05 PROCEDURE — 96374 THER/PROPH/DIAG INJ IV PUSH: CPT

## 2022-12-05 PROCEDURE — 63600175 PHARM REV CODE 636 W HCPCS: Performed by: NURSE PRACTITIONER

## 2022-12-05 PROCEDURE — 99284 PR EMERGENCY DEPT VISIT,LEVEL IV: ICD-10-PCS | Mod: ,,, | Performed by: NURSE PRACTITIONER

## 2022-12-05 PROCEDURE — 99284 EMERGENCY DEPT VISIT MOD MDM: CPT | Mod: 25

## 2022-12-05 RX ORDER — HYDRALAZINE HYDROCHLORIDE 20 MG/ML
10 INJECTION INTRAMUSCULAR; INTRAVENOUS
Status: COMPLETED | OUTPATIENT
Start: 2022-12-05 | End: 2022-12-05

## 2022-12-05 RX ORDER — DOCUSATE SODIUM 100 MG/1
100 CAPSULE, LIQUID FILLED ORAL 2 TIMES DAILY
Qty: 28 CAPSULE | Refills: 0 | Status: SHIPPED | OUTPATIENT
Start: 2022-12-05 | End: 2022-12-19

## 2022-12-05 RX ORDER — POLYETHYLENE GLYCOL 3350 17 G/17G
17 POWDER, FOR SOLUTION ORAL DAILY
Qty: 14 PACKET | Refills: 0 | Status: SHIPPED | OUTPATIENT
Start: 2022-12-05 | End: 2023-01-30 | Stop reason: CLARIF

## 2022-12-05 RX ADMIN — HYDRALAZINE HYDROCHLORIDE 10 MG: 20 INJECTION INTRAMUSCULAR; INTRAVENOUS at 12:12

## 2022-12-05 NOTE — ED PROVIDER NOTES
Encounter Date: 2022       History     Chief Complaint   Patient presents with    Constipation     32 year old female presents to the emergency department to be evaluated for constipation. Her last bowel movement was 6 days ago. She vomited 2 days ago but non since then. She also complains of pain in her left lower abdomen. She has a history of hypertension, said she took her blood pressure medication this morning. She denies headache, chest pain, shortness of breath and reports that her blood pressure is always 170 systolic.     The history is provided by the patient.   Constipation   The current episode started several days ago. Associated symptoms include abdominal pain, nausea and vomiting. Pertinent negatives include no anorexia, no fever, no diarrhea, no hematemesis, no hemorrhoids, no rectal pain, no hematuria, no vaginal bleeding, no vaginal discharge, no chest pain, no headaches, no coughing, no difficulty breathing and no rash.   Review of patient's allergies indicates:  No Known Allergies  Past Medical History:   Diagnosis Date    History of gonorrhea     Migraine without aura, intractable, without status migrainosus     Noncompliance with medication regimen     Syncope      Past Surgical History:   Procedure Laterality Date    Abdominal Wall Mass Surgery  2019     SECTION  08/10/2008     SECTION  01/15/2013    DILATION AND CURETTAGE OF UTERUS  2012     Family History   Problem Relation Age of Onset    Hypertension Mother     Hypertension Father     No Known Problems Brother     No Known Problems Sister     No Known Problems Sister     No Known Problems Sister      Social History     Tobacco Use    Smoking status: Every Day     Packs/day: 1.00     Types: Cigarettes     Start date:     Smokeless tobacco: Never   Substance Use Topics    Alcohol use: Never    Drug use: Never     Review of Systems   Constitutional:  Negative for fever.   Respiratory:  Negative for cough.     Cardiovascular:  Negative for chest pain.   Gastrointestinal:  Positive for abdominal pain, constipation, nausea and vomiting. Negative for anorexia, diarrhea, hematemesis, hemorrhoids and rectal pain.   Genitourinary:  Negative for hematuria, vaginal bleeding and vaginal discharge.   Skin:  Negative for rash.   Neurological:  Negative for headaches.   All other systems reviewed and are negative.    Physical Exam     Initial Vitals [12/05/22 1101]   BP Pulse Resp Temp SpO2   (!) 171/123 90 18 98.4 °F (36.9 °C) 100 %      MAP       --         Physical Exam    Vitals reviewed.  Constitutional: She appears well-developed and well-nourished.   HENT:   Head: Normocephalic and atraumatic.   Eyes: EOM are normal. Pupils are equal, round, and reactive to light.   Neck: Neck supple.   Cardiovascular:  Normal rate and regular rhythm.           Pulmonary/Chest: Breath sounds normal.   Abdominal: Abdomen is soft. Bowel sounds are normal. She exhibits no distension and no mass. There is abdominal tenderness (mild tenderness left lower abdomen). There is no rebound and no guarding.   Musculoskeletal:         General: Normal range of motion.      Cervical back: Neck supple.     Neurological: She is alert and oriented to person, place, and time. She has normal strength. GCS score is 15. GCS eye subscore is 4. GCS verbal subscore is 5. GCS motor subscore is 6.   Skin: Skin is warm and dry. Capillary refill takes less than 2 seconds.   Psychiatric: She has a normal mood and affect.       Medical Screening Exam   See Full Note    ED Course   Procedures  Labs Reviewed   CBC WITH DIFFERENTIAL - Abnormal; Notable for the following components:       Result Value    WBC 3.32 (*)     Hemoglobin 10.6 (*)     Hematocrit 34.8 (*)     MCH 24.6 (*)     MCHC 30.5 (*)     RDW 17.2 (*)     Neutrophils % 43.7 (*)     Lymphocytes % 44.3 (*)     Monocytes % 9.0 (*)     Basophils % 1.2 (*)     Neutrophils, Abs 1.45 (*)     All other components within  normal limits   BASIC METABOLIC PANEL - Normal   CBC W/ AUTO DIFFERENTIAL    Narrative:     The following orders were created for panel order CBC auto differential.  Procedure                               Abnormality         Status                     ---------                               -----------         ------                     CBC with Differential[[id number]]        Abnormal            Final result                 Please view results for these tests on the individual orders.   POCT URINE PREGNANCY          Imaging Results              X-Ray Abdomen Flat And Erect (Final result)  Result time 12/05/22 11:47:38      Final result by Robert Parham DO (12/05/22 11:47:38)                   Impression:      As above.    Point of Service: [facility]      Electronically signed by: Robert Parham  Date:    12/05/2022  Time:    11:47               Narrative:    EXAMINATION:  XR ABDOMEN FLAT AND ERECT    CLINICAL HISTORY:  Constipation, unspecified    COMPARISON:  Abdominal x-ray October 10, 2019    TECHNIQUE:  Frontal views of the abdomen in the supine and upright position.    FINDINGS:  Moderate fecal material noted throughout the colon and rectum suspicious for constipation.  Nonspecific nonobstructive bowel gas pattern.  Moderate levoconvex curvature of the lumbar spine.                                       Medications   hydrALAZINE injection 10 mg (10 mg Intravenous Given 12/5/22 1249)                       Clinical Impression:   Final diagnoses:  [K59.00] Constipation        ED Disposition Condition    Discharge Stable          ED Prescriptions       Medication Sig Dispense Start Date End Date Auth. Provider    polyethylene glycol (GLYCOLAX) 17 gram PwPk Take 17 g by mouth once daily. 14 packet 12/5/2022 -- NICKOLAS Wright    docusate sodium (COLACE) 100 MG capsule Take 1 capsule (100 mg total) by mouth 2 (two) times daily. for 14 days 28 capsule 12/5/2022 12/19/2022 Jodee Baca  P          Follow-up Information    None          Jodee Baca, PAULETTEP  12/05/22 [id number]

## 2022-12-05 NOTE — Clinical Note
"Corinna LANEICA" Roger was seen and treated in our emergency department on 12/5/2022.  She may return to work on 12/06/2022.       If you have any questions or concerns, please don't hesitate to call.      ALEJANDRA Elkins    "

## 2023-01-09 ENCOUNTER — HOSPITAL ENCOUNTER (EMERGENCY)
Facility: HOSPITAL | Age: 33
Discharge: HOME | End: 2023-01-09
Attending: EMERGENCY MEDICINE
Payer: MEDICAID

## 2023-01-09 VITALS
DIASTOLIC BLOOD PRESSURE: 91 MMHG | BODY MASS INDEX: 31.34 KG/M2 | HEIGHT: 66 IN | TEMPERATURE: 99 F | HEART RATE: 90 BPM | RESPIRATION RATE: 18 BRPM | OXYGEN SATURATION: 100 % | WEIGHT: 195 LBS | SYSTOLIC BLOOD PRESSURE: 167 MMHG

## 2023-01-09 DIAGNOSIS — I10 HYPERTENSION, UNSPECIFIED TYPE: Primary | ICD-10-CM

## 2023-01-09 PROCEDURE — 99284 PR EMERGENCY DEPT VISIT,LEVEL IV: ICD-10-PCS | Mod: ,,, | Performed by: EMERGENCY MEDICINE

## 2023-01-09 PROCEDURE — 99281 EMR DPT VST MAYX REQ PHY/QHP: CPT

## 2023-01-09 PROCEDURE — 99284 EMERGENCY DEPT VISIT MOD MDM: CPT | Mod: ,,, | Performed by: EMERGENCY MEDICINE

## 2023-01-09 NOTE — Clinical Note
"Corinna LANEICA" Roger was seen and treated in our emergency department on 1/9/2023.  She may return to work on 01/10/2023.       If you have any questions or concerns, please don't hesitate to call.      Aminah Jean-Baptiste, "

## 2023-01-10 NOTE — ED PROVIDER NOTES
Encounter Date: 2023       History     Chief Complaint   Patient presents with    Hypertension     Pt was seen at [facility] Friday for same complaint - states her blood pressure is still running high - pt states she has not checked her blood pressure but she can tell it is high cause she has been having a headache     Patient is a 31yo AA female with a PMH of HTN, migraine headache who presents to the ED with HTN and headache. Patient was at work at 1:30pm today, and started developing headache, dizziness, bilateral eye pain, and blurry vision. Patient was sent home to get checked. Patient measured BP at home at 630pm and it was 190/117. Patient is on Labetalol QD and usually take her medicine in the morning. She took a second dose of the Labetalol today after checking at home and came to the ED. At ED, patient's BP improved to 167/91. Patient's headache has improved since and she endorses mild left frontal headache. Dizziness, eye pain, and blurry vision have resolved. Patient denies CP, SOB, N/V/D, abdominal pain. Patient endorses urinary frequency but denies dysuria, hematuria, or bloody stool. Of note, patient was seen at the [facility] 3 days ago for the same complaint and her BP improved spontaneously after rest and was sent home. Patient was recommended to followup with a PCP for her HTN management but hasn't had a chance yet. She plans to followup this week.    Review of patient's allergies indicates:  No Known Allergies  Past Medical History:   Diagnosis Date    History of gonorrhea     Hypertension     Migraine without aura, intractable, without status migrainosus     Noncompliance with medication regimen     Syncope      Past Surgical History:   Procedure Laterality Date    Abdominal Wall Mass Surgery  2019     SECTION  08/10/2008     SECTION  01/15/2013    DILATION AND CURETTAGE OF UTERUS  2012     Family History   Problem Relation Age of Onset    Hypertension Mother      Hypertension Father     No Known Problems Brother     No Known Problems Sister     No Known Problems Sister     No Known Problems Sister      Social History     Tobacco Use    Smoking status: Every Day     Packs/day: 1.00     Types: Cigarettes     Start date: 2006    Smokeless tobacco: Never   Substance Use Topics    Alcohol use: Never    Drug use: Never     Review of Systems   Constitutional:  Negative for appetite change, chills, diaphoresis, fatigue and fever.   HENT:  Negative for hearing loss and sore throat.    Eyes:  Negative for photophobia and visual disturbance.   Respiratory:  Negative for cough and shortness of breath.    Cardiovascular:  Negative for chest pain.   Gastrointestinal:  Negative for abdominal pain, diarrhea, nausea and vomiting.   Endocrine: Positive for polyuria.   Genitourinary:  Negative for difficulty urinating, dysuria and hematuria.   Musculoskeletal:  Negative for back pain.   Skin:  Negative for rash.   Neurological:  Positive for headaches. Negative for dizziness, syncope and numbness.   Hematological:  Does not bruise/bleed easily.   Psychiatric/Behavioral:  Negative for self-injury and suicidal ideas.      Physical Exam     Initial Vitals [01/09/23 1916]   BP Pulse Resp Temp SpO2   (!) 167/91 90 18 98.7 °F (37.1 °C) 100 %      MAP       --         Physical Exam    Nursing note and vitals reviewed.  Constitutional: She appears well-developed and well-nourished. She is not diaphoretic. No distress.   HENT:   Head: Normocephalic and atraumatic.   Right Ear: External ear normal.   Left Ear: External ear normal.   Nose: Nose normal.   Mouth/Throat: Oropharynx is clear and moist. No oropharyngeal exudate.   Eyes: Conjunctivae and EOM are normal. Pupils are equal, round, and reactive to light. Right eye exhibits no discharge. Left eye exhibits no discharge.   Neck: Neck supple.   Cardiovascular:  Normal rate and regular rhythm.           Pulmonary/Chest: Breath sounds normal. No  respiratory distress. She has no wheezes.   Abdominal: Abdomen is soft. Bowel sounds are normal.   Musculoskeletal:         General: No tenderness or edema. Normal range of motion.      Cervical back: Neck supple.     Neurological: She is alert. No sensory deficit.   Skin: Skin is warm and dry. No erythema.   Psychiatric: She has a normal mood and affect. Thought content normal.       Medical Screening Exam   See Full Note    ED Course   Procedures  Labs Reviewed - No data to display       Imaging Results    None          Medications - No data to display  Medical Decision Making:   Initial Assessment:   Patient is a 33yo AA female with a PMH of HTN, migraine headache who presents to the ED with HTN and headache. Patient was at work at 1:30pm today, and started developing headache, dizziness, bilateral eye pain, and blurry vision. Patient was sent home to get checked. Patient measured BP at home at 630pm and it was 190/117. Patient is on Labetalol QD and usually take her medicine in the morning. She took a second dose of the Labetalol today after checking at home and came to the ED. At ED, patient's BP improved to 167/91. Patient's headache has improved since and she endorses mild left frontal headache. Dizziness, eye pain, and blurry vision have resolved. Patient denies CP, SOB, N/V/D, abdominal pain. Patient endorses urinary frequency but denies dysuria, hematuria, or bloody stool. Of note, patient was seen at the [facility] 3 days ago for the same complaint and her BP improved spontaneously after rest and was sent home. Patient was recommended to followup with a PCP for her HTN management but hasn't had a chance yet. She plans to followup this week.  Differential Diagnosis:   HTN vs migraine headache  ED Management:  BP improved after home labetalol.   Stable for discharge home. Patient encouraged to f/u with PCP this week for BP management. Patient expressed understanding and agrees.          Attending Attestation:    Physician Attestation Statement for Resident:  As the supervising MD   Physician Attestation Statement: I have personally seen and examined this patient.   I agree with the above history.  -:   As the supervising MD I agree with the above PE.     As the supervising MD I agree with the above treatment, course, plan, and disposition.                        Clinical Impression:   Final diagnoses:  [I10] Hypertension, unspecified type (Primary)        ED Disposition Condition    Discharge Stable          ED Prescriptions    None       Follow-up Information    None          Aminah Jean-Baptiste DO  Resident  01/09/23 0735       Miguel Duran MD  03/09/23 7084

## 2023-01-30 ENCOUNTER — HOSPITAL ENCOUNTER (EMERGENCY)
Facility: HOSPITAL | Age: 33
Discharge: HOME | End: 2023-01-30
Attending: EMERGENCY MEDICINE
Payer: MEDICAID

## 2023-01-30 VITALS
SYSTOLIC BLOOD PRESSURE: 146 MMHG | RESPIRATION RATE: 18 BRPM | WEIGHT: 184.5 LBS | DIASTOLIC BLOOD PRESSURE: 98 MMHG | TEMPERATURE: 98 F | BODY MASS INDEX: 29.65 KG/M2 | HEART RATE: 100 BPM | OXYGEN SATURATION: 99 % | HEIGHT: 66 IN

## 2023-01-30 DIAGNOSIS — I10 HYPERTENSION, UNSPECIFIED TYPE: Primary | ICD-10-CM

## 2023-01-30 PROCEDURE — 99282 EMERGENCY DEPT VISIT SF MDM: CPT

## 2023-01-30 PROCEDURE — 99283 EMERGENCY DEPT VISIT LOW MDM: CPT | Mod: ,,, | Performed by: EMERGENCY MEDICINE

## 2023-01-30 PROCEDURE — 99283 PR EMERGENCY DEPT VISIT,LEVEL III: ICD-10-PCS | Mod: ,,, | Performed by: EMERGENCY MEDICINE

## 2023-01-30 RX ORDER — LABETALOL 200 MG/1
TABLET, FILM COATED ORAL
COMMUNITY
Start: 2022-12-05 | End: 2023-09-01

## 2023-01-30 NOTE — ED PROVIDER NOTES
Encounter Date: 2023       History     Chief Complaint   Patient presents with    Hypertension     31 Y/O FEMALE SAYS BLOOD PRESSURE WAS UP EARLIER, AND IT HAD HER FEELING DIZZY.  HER BLOOD PRESSURE IS BETTER HERE, AND SHE SAYS THAT SHE FEELS MUCH BETTER.      Review of patient's allergies indicates:  No Known Allergies  Past Medical History:   Diagnosis Date    History of gonorrhea     Hypertension     Migraine without aura, intractable, without status migrainosus     Noncompliance with medication regimen     Syncope      Past Surgical History:   Procedure Laterality Date    Abdominal Wall Mass Surgery  2019     SECTION  08/10/2008     SECTION  01/15/2013    DILATION AND CURETTAGE OF UTERUS  2012     Family History   Problem Relation Age of Onset    Hypertension Mother     Hypertension Father     No Known Problems Brother     No Known Problems Sister     No Known Problems Sister     No Known Problems Sister      Social History     Tobacco Use    Smoking status: Every Day     Packs/day: 1.00     Types: Cigarettes     Start date:     Smokeless tobacco: Never   Substance Use Topics    Alcohol use: Never    Drug use: Never     Review of Systems   All other systems reviewed and are negative.    Physical Exam     Initial Vitals [23 0921]   BP Pulse Resp Temp SpO2   (!) 146/98 100 18 97.8 °F (36.6 °C) 99 %      MAP       --         Physical Exam    Nursing note and vitals reviewed.  Constitutional: She appears well-developed and well-nourished.   HENT:   Head: Normocephalic and atraumatic.   Nose: Nose normal.   Mouth/Throat: Oropharynx is clear and moist.   Eyes: Conjunctivae and EOM are normal. Pupils are equal, round, and reactive to light.   Neck: Neck supple.   Normal range of motion.  Cardiovascular:  Normal rate, regular rhythm and normal heart sounds.           Pulmonary/Chest: Breath sounds normal.   Abdominal: Abdomen is soft. Bowel sounds are normal.   Musculoskeletal:          General: Normal range of motion.      Cervical back: Normal range of motion and neck supple.     Neurological: She is alert and oriented to person, place, and time. She has normal strength. GCS score is 15. GCS eye subscore is 4. GCS verbal subscore is 5. GCS motor subscore is 6.   Skin: Skin is warm and dry. Capillary refill takes less than 2 seconds.       Medical Screening Exam   See Full Note    ED Course   Procedures  Labs Reviewed - No data to display       Imaging Results    None          Medications - No data to display  Medical Decision Making:   Initial Assessment:   LIGHT-HEADED WHICH PATIENT ATTRIBUTED TO ELEVATED BLOOD PRESSURE.  SHE TOOK HER MEDICATION AND BLOOD PRESSURE HAS IMPROVED.  PATIENT FEELS BETTER.    Differential Diagnosis:   HYPERTENSION VS ANXIETY VS OTHER  ED Management:  DX:  HYPERTENSION.  CONTINUE CURRENT MEDICATION.                   Clinical Impression:   Final diagnoses:  [I10] Hypertension, unspecified type (Primary)        ED Disposition Condition    Discharge Stable          ED Prescriptions    None       Follow-up Information       Follow up With Specialties Details Why Contact Info    PRIMARY CARE PROVIDER   As needed              Miguel Duran MD  01/30/23 3890

## 2023-01-30 NOTE — Clinical Note
"Corinna LANEICA" Roger was seen and treated in our emergency department on 1/30/2023.  She may return to work on 01/31/2023.       If you have any questions or concerns, please don't hesitate to call.      Kee LUNAN RN    "

## 2023-02-20 ENCOUNTER — HOSPITAL ENCOUNTER (EMERGENCY)
Facility: HOSPITAL | Age: 33
Discharge: HOME | End: 2023-02-20
Payer: MEDICAID

## 2023-02-20 VITALS
HEIGHT: 66 IN | TEMPERATURE: 99 F | OXYGEN SATURATION: 99 % | WEIGHT: 184 LBS | DIASTOLIC BLOOD PRESSURE: 94 MMHG | SYSTOLIC BLOOD PRESSURE: 149 MMHG | HEART RATE: 74 BPM | RESPIRATION RATE: 20 BRPM | BODY MASS INDEX: 29.57 KG/M2

## 2023-02-20 DIAGNOSIS — E87.6 HYPOKALEMIA: ICD-10-CM

## 2023-02-20 DIAGNOSIS — R51.9 NONINTRACTABLE HEADACHE, UNSPECIFIED CHRONICITY PATTERN, UNSPECIFIED HEADACHE TYPE: Primary | ICD-10-CM

## 2023-02-20 DIAGNOSIS — I10 UNCONTROLLED HYPERTENSION: ICD-10-CM

## 2023-02-20 LAB
ANION GAP SERPL CALCULATED.3IONS-SCNC: 10 MMOL/L (ref 7–16)
BASOPHILS # BLD AUTO: 0.07 K/UL (ref 0–0.2)
BASOPHILS NFR BLD AUTO: 1.2 % (ref 0–1)
BUN SERPL-MCNC: 7 MG/DL (ref 7–18)
BUN/CREAT SERPL: 9 (ref 6–20)
CALCIUM SERPL-MCNC: 8.4 MG/DL (ref 8.5–10.1)
CHLORIDE SERPL-SCNC: 105 MMOL/L (ref 98–107)
CO2 SERPL-SCNC: 28 MMOL/L (ref 21–32)
CREAT SERPL-MCNC: 0.76 MG/DL (ref 0.55–1.02)
DIFFERENTIAL METHOD BLD: ABNORMAL
EGFR (NO RACE VARIABLE) (RUSH/TITUS): 107 ML/MIN/1.73M²
EOSINOPHIL # BLD AUTO: 0.08 K/UL (ref 0–0.5)
EOSINOPHIL NFR BLD AUTO: 1.3 % (ref 1–4)
ERYTHROCYTE [DISTWIDTH] IN BLOOD BY AUTOMATED COUNT: 15.5 % (ref 11.5–14.5)
GLUCOSE SERPL-MCNC: 79 MG/DL (ref 74–106)
HCT VFR BLD AUTO: 31.8 % (ref 38–47)
HGB BLD-MCNC: 10 G/DL (ref 12–16)
IMM GRANULOCYTES # BLD AUTO: 0.02 K/UL (ref 0–0.04)
IMM GRANULOCYTES NFR BLD: 0.3 % (ref 0–0.4)
LYMPHOCYTES # BLD AUTO: 1.96 K/UL (ref 1–4.8)
LYMPHOCYTES NFR BLD AUTO: 32.6 % (ref 27–41)
MCH RBC QN AUTO: 25.6 PG (ref 27–31)
MCHC RBC AUTO-ENTMCNC: 31.4 G/DL (ref 32–36)
MCV RBC AUTO: 81.3 FL (ref 80–96)
MONOCYTES # BLD AUTO: 0.4 K/UL (ref 0–0.8)
MONOCYTES NFR BLD AUTO: 6.7 % (ref 2–6)
MPC BLD CALC-MCNC: 9.4 FL (ref 9.4–12.4)
NEUTROPHILS # BLD AUTO: 3.48 K/UL (ref 1.8–7.7)
NEUTROPHILS NFR BLD AUTO: 57.9 % (ref 53–65)
NRBC # BLD AUTO: 0 X10E3/UL
NRBC, AUTO (.00): 0 %
PLATELET # BLD AUTO: 275 K/UL (ref 150–400)
POTASSIUM SERPL-SCNC: 3.4 MMOL/L (ref 3.5–5.1)
RBC # BLD AUTO: 3.91 M/UL (ref 4.2–5.4)
SODIUM SERPL-SCNC: 140 MMOL/L (ref 136–145)
WBC # BLD AUTO: 6.01 K/UL (ref 4.5–11)

## 2023-02-20 PROCEDURE — 80048 BASIC METABOLIC PNL TOTAL CA: CPT | Performed by: NURSE PRACTITIONER

## 2023-02-20 PROCEDURE — 99284 EMERGENCY DEPT VISIT MOD MDM: CPT

## 2023-02-20 PROCEDURE — 96372 THER/PROPH/DIAG INJ SC/IM: CPT | Performed by: NURSE PRACTITIONER

## 2023-02-20 PROCEDURE — 85025 COMPLETE CBC W/AUTO DIFF WBC: CPT | Performed by: NURSE PRACTITIONER

## 2023-02-20 PROCEDURE — 99284 EMERGENCY DEPT VISIT MOD MDM: CPT | Mod: ,,, | Performed by: NURSE PRACTITIONER

## 2023-02-20 PROCEDURE — 99284 PR EMERGENCY DEPT VISIT,LEVEL IV: ICD-10-PCS | Mod: ,,, | Performed by: NURSE PRACTITIONER

## 2023-02-20 PROCEDURE — 63600175 PHARM REV CODE 636 W HCPCS: Performed by: NURSE PRACTITIONER

## 2023-02-20 PROCEDURE — 25000003 PHARM REV CODE 250: Performed by: NURSE PRACTITIONER

## 2023-02-20 RX ORDER — POTASSIUM CHLORIDE 20 MEQ/1
40 TABLET, EXTENDED RELEASE ORAL
Status: COMPLETED | OUTPATIENT
Start: 2023-02-20 | End: 2023-02-20

## 2023-02-20 RX ORDER — KETOROLAC TROMETHAMINE 30 MG/ML
30 INJECTION, SOLUTION INTRAMUSCULAR; INTRAVENOUS
Status: COMPLETED | OUTPATIENT
Start: 2023-02-20 | End: 2023-02-20

## 2023-02-20 RX ADMIN — POTASSIUM CHLORIDE 40 MEQ: 1500 TABLET, EXTENDED RELEASE ORAL at 10:02

## 2023-02-20 RX ADMIN — KETOROLAC TROMETHAMINE 30 MG: 30 INJECTION, SOLUTION INTRAMUSCULAR; INTRAVENOUS at 10:02

## 2023-02-20 NOTE — Clinical Note
"Elsa HERRING" Roger was seen and treated in our emergency department on 2/20/2023.  She may return to work on 02/21/2023.       If you have any questions or concerns, please don't hesitate to call.      SANJUANITA RN    "

## 2023-02-21 NOTE — DISCHARGE INSTRUCTIONS
Continue to take prescriptions as previously directed. It is important to take medications as directed. Follow a low sat diet. Healthy diet and exercise. Follow up with primary care provider within the next week for recheck and continued care and management. Return to the ED for worsening signs and symptoms or otherwise as needed.

## 2023-02-21 NOTE — ED PROVIDER NOTES
"Encounter Date: 2023       History     Chief Complaint   Patient presents with    Dizziness     Pt states dizziness earlier that lasted 5 or 6 minutes, states she feels like her blood pressure is up.      33 y/o AAF with PMH of Migraine and HTN presents to the emergency department with c/o headache and dizziness that occurred prior to arrival. Pt has had multiple ED visits in the past two months with same complaint. States she has not been checking her blood pressure at home and she takes her blood pressure medication "most of the time". She denies blurred vision, dizziness at this time. Reports mild headache but not worst of her life. She denies nausea, vomiting, fevers or chills. There are no exacerbating or remitting factors.     The history is provided by the patient and medical records.   Review of patient's allergies indicates:  No Known Allergies  Past Medical History:   Diagnosis Date    History of gonorrhea     Hypertension     Migraine without aura, intractable, without status migrainosus     Noncompliance with medication regimen     Syncope      Past Surgical History:   Procedure Laterality Date    Abdominal Wall Mass Surgery  2019     SECTION  08/10/2008     SECTION  01/15/2013    DILATION AND CURETTAGE OF UTERUS  2012     Family History   Problem Relation Age of Onset    Hypertension Mother     Hypertension Father     No Known Problems Brother     No Known Problems Sister     No Known Problems Sister     No Known Problems Sister      Social History     Tobacco Use    Smoking status: Every Day     Packs/day: 1.00     Types: Cigarettes     Start date:     Smokeless tobacco: Never   Substance Use Topics    Alcohol use: Never    Drug use: Never     Review of Systems   Constitutional:  Negative for chills and fever.   HENT:  Negative for congestion.    Eyes:  Negative for photophobia, pain and visual disturbance.   Respiratory:  Negative for shortness of breath.  "   Cardiovascular:  Negative for chest pain and palpitations.   Gastrointestinal:  Negative for abdominal pain, constipation, diarrhea, nausea and vomiting.   Genitourinary:  Negative for dysuria and hematuria.   Neurological:  Positive for dizziness and headaches. Negative for tremors, seizures, syncope, speech difficulty, weakness, light-headedness and numbness.   All other systems reviewed and are negative.    Physical Exam     Initial Vitals [02/20/23 2026]   BP Pulse Resp Temp SpO2   (!) 158/99 74 18 99 °F (37.2 °C) 100 %      MAP       --         Physical Exam    Constitutional: She appears well-developed and well-nourished. She is cooperative.   Cardiovascular:  Normal rate, regular rhythm, normal heart sounds and normal pulses.           Pulmonary/Chest: Effort normal and breath sounds normal.   Abdominal: Abdomen is soft. Bowel sounds are normal. There is no abdominal tenderness.     Neurological: She is alert and oriented to person, place, and time. She has normal strength. She displays a negative Romberg sign. GCS eye subscore is 4. GCS verbal subscore is 5. GCS motor subscore is 6.   afocal   Skin: Skin is warm, dry and intact. Capillary refill takes less than 2 seconds.   Psychiatric: She has a normal mood and affect. Her speech is normal and behavior is normal. Judgment and thought content normal. Cognition and memory are normal.       Medical Screening Exam   See Full Note    ED Course   Procedures  Labs Reviewed   BASIC METABOLIC PANEL - Abnormal; Notable for the following components:       Result Value    Potassium 3.4 (*)     Calcium 8.4 (*)     All other components within normal limits   CBC WITH DIFFERENTIAL - Abnormal; Notable for the following components:    RBC 3.91 (*)     Hemoglobin 10.0 (*)     Hematocrit 31.8 (*)     MCH 25.6 (*)     MCHC 31.4 (*)     RDW 15.5 (*)     Monocytes % 6.7 (*)     Basophils % 1.2 (*)     All other components within normal limits   CBC W/ AUTO DIFFERENTIAL     "Narrative:     The following orders were created for panel order CBC auto differential.  Procedure                               Abnormality         Status                     ---------                               -----------         ------                     CBC with Differential[[id number]]        Abnormal            Final result                 Please view results for these tests on the individual orders.          Imaging Results    None          Medications   potassium chloride SA CR tablet 40 mEq (has no administration in time range)   ketorolac injection 30 mg (has no administration in time range)     Medical Decision Making:   Initial Assessment:   31 y/o AAF with PMH of Migraine and HTN presents to the emergency department with c/o headache and dizziness that occurred prior to arrival. Pt has had multiple ED visits in the past two months with same complaint. States she has not been checking her blood pressure at home and she takes her blood pressure medication "most of the time". She denies blurred vision, dizziness at this time. Reports mild headache but not worst of her life. She denies nausea, vomiting, fevers or chills. There are no exacerbating or remitting factors.     The history is provided by the patient and medical records.   Differential Diagnosis:   Uncontrolled HTN  Migraine  Common Headache  Vs other  Clinical Tests:   Lab Tests: Ordered and Reviewed  ED Management:  Potassium 3.4, supplemented. Toradol given for Headache. Encouraged compliance with medication with close monitoring of bp at home and to keep a log to take to f/u appt with PCP. Encouraged to f/u with PCP within the next week. Warning s/s discussed and return precautions given; the patient has v/u.                   Clinical Impression:   Final diagnoses:  [R51.9] Nonintractable headache, unspecified chronicity pattern, unspecified headache type (Primary)  [E87.6] Hypokalemia  [I10] Uncontrolled hypertension        ED " Disposition Condition    Discharge Stable          ED Prescriptions    None       Follow-up Information       Follow up With Specialties Details Why Contact Info    Primary Care Provider  In 1 week               NICKOLAS Shah  02/20/23 6060

## 2023-02-24 ENCOUNTER — HOSPITAL ENCOUNTER (EMERGENCY)
Facility: HOSPITAL | Age: 33
Discharge: HOME | End: 2023-02-24
Attending: EMERGENCY MEDICINE
Payer: MEDICAID

## 2023-02-24 VITALS
HEART RATE: 95 BPM | WEIGHT: 179.5 LBS | TEMPERATURE: 99 F | DIASTOLIC BLOOD PRESSURE: 81 MMHG | SYSTOLIC BLOOD PRESSURE: 135 MMHG | HEIGHT: 66 IN | BODY MASS INDEX: 28.85 KG/M2 | OXYGEN SATURATION: 100 % | RESPIRATION RATE: 16 BRPM

## 2023-02-24 DIAGNOSIS — A09 DIARRHEA OF INFECTIOUS ORIGIN: Primary | ICD-10-CM

## 2023-02-24 LAB
FLUAV AG UPPER RESP QL IA.RAPID: NEGATIVE
FLUBV AG UPPER RESP QL IA.RAPID: NEGATIVE
SARS-COV+SARS-COV-2 AG RESP QL IA.RAPID: NEGATIVE

## 2023-02-24 PROCEDURE — 99283 EMERGENCY DEPT VISIT LOW MDM: CPT | Mod: ,,, | Performed by: EMERGENCY MEDICINE

## 2023-02-24 PROCEDURE — 99282 EMERGENCY DEPT VISIT SF MDM: CPT

## 2023-02-24 PROCEDURE — 99283 PR EMERGENCY DEPT VISIT,LEVEL III: ICD-10-PCS | Mod: ,,, | Performed by: EMERGENCY MEDICINE

## 2023-02-24 PROCEDURE — 87428 SARSCOV & INF VIR A&B AG IA: CPT | Performed by: EMERGENCY MEDICINE

## 2023-02-24 RX ORDER — NORETHINDRONE 0.35 MG/1
1 TABLET ORAL
COMMUNITY
Start: 2023-01-06

## 2023-02-24 RX ORDER — AMLODIPINE BESYLATE 5 MG/1
5 TABLET ORAL
COMMUNITY
Start: 2023-01-30 | End: 2023-08-24 | Stop reason: SDUPTHER

## 2023-02-24 NOTE — Clinical Note
"Elsa"LUIS Duran was seen and treated in our emergency department on 2/24/2023.  She may return to work on 02/27/2023.       If you have any questions or concerns, please don't hesitate to call.      Kory Reaves MD"

## 2023-02-24 NOTE — ED PROVIDER NOTES
Encounter Date: 2023       History     Chief Complaint   Patient presents with    Diarrhea    Abdominal Pain     Patient complains of diarrhea for the past day.  She is had several episodes per day of watery stool.  Complains of some abdominal discomfort but denies outight abdominal pain - just churning sensation.  No vomiting.  No fever.  Patient has had a cough for about 1 week.  Her child currently has RSV.  Patient has had COVID previously.    Review of patient's allergies indicates:  No Known Allergies  Past Medical History:   Diagnosis Date    History of gonorrhea     Hypertension     Migraine without aura, intractable, without status migrainosus     Noncompliance with medication regimen     Syncope      Past Surgical History:   Procedure Laterality Date    Abdominal Wall Mass Surgery  2019     SECTION  08/10/2008     SECTION  01/15/2013    DILATION AND CURETTAGE OF UTERUS  2012     Family History   Problem Relation Age of Onset    Hypertension Mother     Hypertension Father     No Known Problems Brother     No Known Problems Sister     No Known Problems Sister     No Known Problems Sister      Social History     Tobacco Use    Smoking status: Every Day     Packs/day: 1.00     Types: Cigarettes     Start date:     Smokeless tobacco: Never   Substance Use Topics    Alcohol use: Never    Drug use: Never     Review of Systems   Constitutional:  Negative for fever.   HENT:  Negative for sore throat.    Respiratory:  Negative for shortness of breath.    Cardiovascular:  Negative for chest pain.   Gastrointestinal:  Negative for nausea.   Genitourinary:  Negative for dysuria.   Musculoskeletal:  Negative for back pain.   Skin:  Negative for rash.   Neurological:  Negative for weakness.   Hematological:  Does not bruise/bleed easily.     Physical Exam     Initial Vitals [23 0819]   BP Pulse Resp Temp SpO2   135/81 95 16 99.3 °F (37.4 °C) 100 %      MAP       --          Physical Exam    Nursing note and vitals reviewed.  Constitutional: She appears well-developed and well-nourished.   HENT:   Head: Normocephalic and atraumatic.   Eyes: EOM are normal. Pupils are equal, round, and reactive to light.   Neck: Neck supple. No thyromegaly present.   Normal range of motion.  Cardiovascular:  Normal rate, regular rhythm, normal heart sounds and intact distal pulses.           No murmur heard.  Pulmonary/Chest: Breath sounds normal. No respiratory distress. She has no wheezes.   Abdominal: Abdomen is soft. Bowel sounds are normal. She exhibits no distension. There is no abdominal tenderness.   Musculoskeletal:         General: No tenderness or edema. Normal range of motion.      Cervical back: Normal range of motion and neck supple.     Lymphadenopathy:     She has no cervical adenopathy.   Neurological: She is alert and oriented to person, place, and time. She has normal strength. No cranial nerve deficit or sensory deficit.   Skin: Skin is warm and dry. No rash noted.   Psychiatric: She has a normal mood and affect.       Medical Screening Exam   See Full Note    ED Course   Procedures  Labs Reviewed   SARS-COV2 (COVID) W/ FLU ANTIGEN          Imaging Results    None          Medications - No data to display  Medical Decision Making:   ED Management:  [facility]    Patient presents for emergent evaluation of acute diarrhea that poses a threat to life and/or bodily function.    In the ED patient found to have acute diarrhea.      Discharge [facility]  Patient was discharged in stable condition.  Detailed return precautions discussed.   Considered infectious or inflammatory diarrhea.  Also consider dehydration electrolyte abnormality surgical abnormality of the abdomen and other conditions but patient's abdomen was nontender she was well hydrated appearing.  Appears to be infectious diarrhea advised to use Imodium ad clear liquid diet advance as tolerated poorly hydrate.                 Clinical  Impression:   Final diagnoses:  [A09] Diarrhea of infectious origin (Primary)        ED Disposition Condition    Discharge Stable          ED Prescriptions    None       Follow-up Information       Follow up With Specialties Details Why Contact Info    [facility] - Emergency Department Emergency Medicine Go to  As needed, If symptoms worsen [address] [place] [id number] [phone]             Kory Reaves MD  02/24/23 6420

## 2023-02-24 NOTE — DISCHARGE INSTRUCTIONS
Check for flu and COVID results on my charts.  If positive handle as we discussed    Drink plenty of fluids.  Start with a clear liquid diet.  Gradually advance diet as tolerated    Use Imodium as needed.  Use Tylenol as needed

## 2023-03-06 ENCOUNTER — HOSPITAL ENCOUNTER (EMERGENCY)
Facility: HOSPITAL | Age: 33
Discharge: HOME | End: 2023-03-06
Payer: MEDICAID

## 2023-03-06 VITALS
OXYGEN SATURATION: 99 % | RESPIRATION RATE: 17 BRPM | DIASTOLIC BLOOD PRESSURE: 98 MMHG | HEART RATE: 78 BPM | TEMPERATURE: 98 F | SYSTOLIC BLOOD PRESSURE: 148 MMHG | HEIGHT: 66 IN | WEIGHT: 182 LBS | BODY MASS INDEX: 29.25 KG/M2

## 2023-03-06 DIAGNOSIS — J06.9 VIRAL URI WITH COUGH: Primary | ICD-10-CM

## 2023-03-06 PROCEDURE — 99283 PR EMERGENCY DEPT VISIT,LEVEL III: ICD-10-PCS | Mod: ,,, | Performed by: NURSE PRACTITIONER

## 2023-03-06 PROCEDURE — 87428 SARSCOV & INF VIR A&B AG IA: CPT | Performed by: NURSE PRACTITIONER

## 2023-03-06 PROCEDURE — 99283 EMERGENCY DEPT VISIT LOW MDM: CPT | Mod: ,,, | Performed by: NURSE PRACTITIONER

## 2023-03-06 PROCEDURE — 99283 EMERGENCY DEPT VISIT LOW MDM: CPT

## 2023-03-06 RX ORDER — LORATADINE 10 MG/1
10 TABLET ORAL DAILY
Qty: 30 TABLET | Refills: 11 | Status: SHIPPED | OUTPATIENT
Start: 2023-03-06 | End: 2023-09-01

## 2023-03-06 RX ORDER — FLUTICASONE PROPIONATE 50 MCG
1 SPRAY, SUSPENSION (ML) NASAL 2 TIMES DAILY PRN
Qty: 15 G | Refills: 0 | Status: SHIPPED | OUTPATIENT
Start: 2023-03-06 | End: 2023-09-01

## 2023-03-06 NOTE — ED PROVIDER NOTES
Encounter Date: 3/6/2023       History     Chief Complaint   Patient presents with    Sore Throat     32-year-old female presents to the emergency department to be evaluated for sore throat, runny nose and cough that began 3 days ago.    The history is provided by the patient.   URI  The primary symptoms include sore throat and cough. Primary symptoms do not include fever, fatigue, headaches, ear pain, swollen glands, wheezing, abdominal pain, nausea, vomiting, myalgias, arthralgias or rash.   Symptoms associated with the illness include congestion and rhinorrhea.   Review of patient's allergies indicates:  No Known Allergies  Past Medical History:   Diagnosis Date    History of gonorrhea     Hypertension     Migraine without aura, intractable, without status migrainosus     Noncompliance with medication regimen     Syncope      Past Surgical History:   Procedure Laterality Date    Abdominal Wall Mass Surgery  2019     SECTION  08/10/2008     SECTION  01/15/2013    DILATION AND CURETTAGE OF UTERUS  2012     Family History   Problem Relation Age of Onset    Hypertension Mother     Hypertension Father     No Known Problems Brother     No Known Problems Sister     No Known Problems Sister     No Known Problems Sister      Social History     Tobacco Use    Smoking status: Every Day     Packs/day: 1.00     Types: Cigarettes     Start date:     Smokeless tobacco: Never   Substance Use Topics    Alcohol use: Never    Drug use: Never     Review of Systems   Constitutional:  Negative for fatigue and fever.   HENT:  Positive for congestion, rhinorrhea and sore throat. Negative for ear pain.    Respiratory:  Positive for cough. Negative for wheezing.    Gastrointestinal:  Negative for abdominal pain, nausea and vomiting.   Musculoskeletal:  Negative for arthralgias and myalgias.   Skin:  Negative for rash.   Neurological:  Negative for headaches.   All other systems reviewed and are  negative.    Physical Exam     Initial Vitals [23 1023]   BP Pulse Resp Temp SpO2   (!) 148/98 78 17 97.6 °F (36.4 °C) 99 %      MAP       --         Physical Exam    Vitals reviewed.  Constitutional: She appears well-developed and well-nourished.   HENT:   Head: Normocephalic and atraumatic.   Right Ear: Tympanic membrane normal.   Left Ear: Tympanic membrane normal.   Mouth/Throat: Uvula is midline, oropharynx is clear and moist and mucous membranes are normal.   Eyes: EOM are normal. Pupils are equal, round, and reactive to light.   Neck: Neck supple.   Cardiovascular:  Normal rate and regular rhythm.           Pulmonary/Chest: Breath sounds normal.   Abdominal: Abdomen is soft. Bowel sounds are normal. She exhibits no distension and no mass. There is no abdominal tenderness. There is no rebound and no guarding.   Musculoskeletal:         General: Normal range of motion.      Cervical back: Neck supple.     Neurological: She is alert and oriented to person, place, and time. She has normal strength. GCS score is 15. GCS eye subscore is 4. GCS verbal subscore is 5. GCS motor subscore is 6.   Skin: Skin is warm and dry. Capillary refill takes less than 2 seconds.   Psychiatric: She has a normal mood and affect.       Medical Screening Exam   See Full Note    ED Course   Procedures  Labs Reviewed   SARS-COV2 (COVID) W/ FLU ANTIGEN - Normal    Narrative:     Negative SARS-CoV results should not be used as the sole basis for treatment or patient management decisions; negative results should be considered in the context of a patient's recent exposures, history and the presene of clinical signs and symptoms consistent with COVID-19.  Negative results should be treated as presumptive and confirmed by molecular assay, if necessary for patient management.          Imaging Results    None          Medications - No data to display  Medical Decision Makin-year-old female presents to the emergency department to be  evaluated for sore throat, runny nose and cough that began 3 days ago.  Flu and COVID swabs are negative.  Diagnosis:  Viral upper respiratory illness  Patient was prescribed Flonase and Claritin and instructed to return for any increase in symptoms or for any other new or worrisome symptoms.                 Clinical Impression:   Final diagnoses:  [J06.9] Viral URI with cough (Primary)        ED Disposition Condition    Discharge Stable          ED Prescriptions       Medication Sig Dispense Start Date End Date Auth. Provider    fluticasone propionate (FLONASE) 50 mcg/actuation nasal spray 1 spray (50 mcg total) by Each Nostril route 2 (two) times daily as needed. 15 g 3/6/2023 -- NICKOLAS Wright    loratadine (CLARITIN) 10 mg tablet Take 1 tablet (10 mg total) by mouth once daily. 30 tablet 3/6/2023 3/5/2024 NICKOLAS Wright          Follow-up Information    None          NICKOLAS Wright  03/06/23 1110

## 2023-03-06 NOTE — DISCHARGE INSTRUCTIONS
Take Claritin and Flonase as directed.Follow up with your primary care provider in 2 days. Return to the emergency department for any increase in symptoms or for any other new or worrisome symptoms.

## 2023-03-06 NOTE — Clinical Note
"Corinna LANEICA" Roger was seen and treated in our emergency department on 3/6/2023.  She may return to work on 03/07/2023.       If you have any questions or concerns, please don't hesitate to call.      ALEJANDRA Elkins    "

## 2023-03-06 NOTE — Clinical Note
"Corinna LANEICA" Roger was seen and treated in our emergency department on 3/6/2023.  She may return to work on 03/06/2023.       If you have any questions or concerns, please don't hesitate to call.      ALEJANDRA Elkins    "

## 2023-03-08 ENCOUNTER — HOSPITAL ENCOUNTER (EMERGENCY)
Facility: HOSPITAL | Age: 33
Discharge: HOME | End: 2023-03-08
Payer: MEDICAID

## 2023-03-08 VITALS
DIASTOLIC BLOOD PRESSURE: 93 MMHG | SYSTOLIC BLOOD PRESSURE: 137 MMHG | HEART RATE: 86 BPM | TEMPERATURE: 98 F | BODY MASS INDEX: 29.49 KG/M2 | OXYGEN SATURATION: 100 % | WEIGHT: 183.5 LBS | HEIGHT: 66 IN | RESPIRATION RATE: 20 BRPM

## 2023-03-08 DIAGNOSIS — J32.9 SINUSITIS, UNSPECIFIED CHRONICITY, UNSPECIFIED LOCATION: Primary | ICD-10-CM

## 2023-03-08 PROCEDURE — 99283 EMERGENCY DEPT VISIT LOW MDM: CPT

## 2023-03-08 PROCEDURE — 99283 PR EMERGENCY DEPT VISIT,LEVEL III: ICD-10-PCS | Mod: ,,, | Performed by: NURSE PRACTITIONER

## 2023-03-08 PROCEDURE — 99283 EMERGENCY DEPT VISIT LOW MDM: CPT | Mod: ,,, | Performed by: NURSE PRACTITIONER

## 2023-03-08 RX ORDER — AMOXICILLIN 500 MG/1
500 CAPSULE ORAL 3 TIMES DAILY
Qty: 30 CAPSULE | Refills: 0 | Status: SHIPPED | OUTPATIENT
Start: 2023-03-08 | End: 2023-03-18

## 2023-03-08 NOTE — DISCHARGE INSTRUCTIONS
Take antibiotics as directed.Follow up with your primary care provider in 2 days. Return to the emergency department for any increase in symptoms or for any other new or worrisome symptoms.

## 2023-03-08 NOTE — ED PROVIDER NOTES
Encounter Date: 3/8/2023       History     Chief Complaint   Patient presents with    Headache     Pt c/o headache since she was prescribed Claritin on Monday. Pt states she has taken tylenol and ibuprofen for pain.     32-year-old female presents to the emergency department to be evaluated for a headache.  She has been treated for runny nose and sinus issues with Claritin and Flonase and has had no improvement in her symptoms.    The history is provided by the patient.   Headache   This is a new problem. The current episode started in the past 7 days. The pain is located in the Frontal region. The pain quality is similar to prior headaches. The quality of the pain is described as dull. Pertinent negatives include no abdominal pain, abnormal behavior, anorexia, back pain, blurred vision, coughing, dizziness, drainage, ear pain, eye pain, eye redness, eye watering, facial sweating, fever, hearing loss, insomnia, loss of balance, muscle aches, nausea, neck pain, numbness, phonophobia, photophobia, rhinorrhea, scalp tenderness, seizures, sinus pressure, sore throat, swollen glands, tingling, tinnitus, visual change, vomiting, weakness or weight loss.   Review of patient's allergies indicates:  No Known Allergies  Past Medical History:   Diagnosis Date    History of gonorrhea     Hypertension     Migraine without aura, intractable, without status migrainosus     Noncompliance with medication regimen     Syncope      Past Surgical History:   Procedure Laterality Date    Abdominal Wall Mass Surgery  2019     SECTION  08/10/2008     SECTION  01/15/2013    DILATION AND CURETTAGE OF UTERUS  2012     Family History   Problem Relation Age of Onset    Hypertension Mother     Hypertension Father     No Known Problems Brother     No Known Problems Sister     No Known Problems Sister     No Known Problems Sister      Social History     Tobacco Use    Smoking status: Every Day     Packs/day: 1.00      Types: Cigarettes     Start date: 2006    Smokeless tobacco: Never   Substance Use Topics    Alcohol use: Never    Drug use: Never     Review of Systems   Constitutional:  Negative for fever and weight loss.   HENT:  Negative for ear pain, hearing loss, rhinorrhea, sinus pressure, sore throat and tinnitus.    Eyes:  Negative for blurred vision, photophobia, pain and redness.   Respiratory:  Negative for cough.    Gastrointestinal:  Negative for abdominal pain, anorexia, nausea and vomiting.   Musculoskeletal:  Negative for back pain and neck pain.   Neurological:  Positive for headaches. Negative for dizziness, tingling, seizures, weakness, numbness and loss of balance.   Psychiatric/Behavioral:  The patient does not have insomnia.    All other systems reviewed and are negative.    Physical Exam     Initial Vitals [03/08/23 0957]   BP Pulse Resp Temp SpO2   (!) 137/93 86 20 98.3 °F (36.8 °C) 100 %      MAP       --         Physical Exam    Vitals reviewed.  Constitutional: She appears well-developed and well-nourished.   HENT:   Head: Normocephalic and atraumatic.   Right Ear: Tympanic membrane normal.   Left Ear: Tympanic membrane normal.   Mouth/Throat: Oropharynx is clear and moist and mucous membranes are normal.   Eyes: EOM are normal. Pupils are equal, round, and reactive to light.   Neck: Neck supple.   Cardiovascular:  Normal rate and regular rhythm.           Pulmonary/Chest: Breath sounds normal.   Abdominal: Abdomen is soft. Bowel sounds are normal. She exhibits no distension and no mass. There is no abdominal tenderness. There is no rebound and no guarding.   Musculoskeletal:         General: Normal range of motion.      Cervical back: Neck supple.     Neurological: She is alert and oriented to person, place, and time. She has normal strength. GCS score is 15. GCS eye subscore is 4. GCS verbal subscore is 5. GCS motor subscore is 6.   Skin: Skin is warm and dry. Capillary refill takes less than 2  seconds.   Psychiatric: She has a normal mood and affect.       Medical Screening Exam   See Full Note    ED Course   Procedures  Labs Reviewed - No data to display       Imaging Results    None          Medications - No data to display  Medical Decision Makin-year-old female presents to the emergency department to be evaluated for a headache.  She has been treated for runny nose and sinus issues with Claritin and Flonase and has had no improvement in her symptoms.  Patient was prescribed amoxicillin  Diagnosis:  Sinusitis                 Clinical Impression:   Final diagnoses:  [J32.9] Sinusitis, unspecified chronicity, unspecified location (Primary)        ED Disposition Condition    Discharge Stable          ED Prescriptions       Medication Sig Dispense Start Date End Date Auth. Provider    amoxicillin (AMOXIL) 500 MG capsule Take 1 capsule (500 mg total) by mouth 3 (three) times daily. for 10 days 30 capsule 3/8/2023 3/18/2023 NICKOLAS Wright          Follow-up Information    None          NICKOLAS Wright  23 1007

## 2023-03-08 NOTE — Clinical Note
"Elsa HERRING" Roger was seen and treated in our emergency department on 3/8/2023.  She may return to work on 03/09/2023.       If you have any questions or concerns, please don't hesitate to call.      NICKOLAS Wright"

## 2023-03-31 ENCOUNTER — HOSPITAL ENCOUNTER (EMERGENCY)
Facility: HOSPITAL | Age: 33
Discharge: HOME | End: 2023-03-31
Attending: EMERGENCY MEDICINE
Payer: MEDICAID

## 2023-03-31 VITALS
WEIGHT: 175 LBS | DIASTOLIC BLOOD PRESSURE: 98 MMHG | OXYGEN SATURATION: 99 % | HEART RATE: 101 BPM | BODY MASS INDEX: 28.12 KG/M2 | RESPIRATION RATE: 18 BRPM | TEMPERATURE: 99 F | HEIGHT: 66 IN | SYSTOLIC BLOOD PRESSURE: 145 MMHG

## 2023-03-31 DIAGNOSIS — J06.9 URI WITH COUGH AND CONGESTION: Primary | ICD-10-CM

## 2023-03-31 DIAGNOSIS — Z72.0 TOBACCO ABUSE: ICD-10-CM

## 2023-03-31 PROCEDURE — 99282 EMERGENCY DEPT VISIT SF MDM: CPT

## 2023-03-31 PROCEDURE — 99283 PR EMERGENCY DEPT VISIT,LEVEL III: ICD-10-PCS | Mod: ,,, | Performed by: EMERGENCY MEDICINE

## 2023-03-31 PROCEDURE — 99283 EMERGENCY DEPT VISIT LOW MDM: CPT | Mod: ,,, | Performed by: EMERGENCY MEDICINE

## 2023-03-31 NOTE — DISCHARGE INSTRUCTIONS
Stop smoking cigarettes.  Take NyQuil at night and DayQuil during the daytime.  Return to emergency department for any worsening or further problems.  Follow up in clinic with primary care provider in 2-3 days if symptoms persist.

## 2023-03-31 NOTE — ED NOTES
"Walked into pt room to discuss discharge paperwork. Pt states "I already know what he said, so you do not have to read that paper to me. What is corporate's number? I would like to report that Doctor. I don't know why ya'll keep giving him to me." Pt given number to ED and informed pt that the ED manager was Prasad. No other complaints or needs noted at this time.   "

## 2023-03-31 NOTE — ED PROVIDER NOTES
Encounter Date: 3/31/2023       History     Chief Complaint   Patient presents with    URI     Patient is a 32-year-old smoker who presents to the emergency department for the 3rd time in the last month complaining of upper respiratory symptoms.  Patient states this episode of cough and congestion has been present for about 6 days.  Cough is productive of clear sputum.  No fever, no shortness of breath, no other acute problems or complaints at this time.  Patient has not attempted to see her primary care doctor about any of this.    Review of patient's allergies indicates:  No Known Allergies  Past Medical History:   Diagnosis Date    History of gonorrhea     Hypertension     Migraine without aura, intractable, without status migrainosus     Noncompliance with medication regimen     Syncope      Past Surgical History:   Procedure Laterality Date    Abdominal Wall Mass Surgery  2019     SECTION  08/10/2008     SECTION  01/15/2013    DILATION AND CURETTAGE OF UTERUS  2012     Family History   Problem Relation Age of Onset    Hypertension Mother     Hypertension Father     No Known Problems Brother     No Known Problems Sister     No Known Problems Sister     No Known Problems Sister      Social History     Tobacco Use    Smoking status: Every Day     Packs/day: 1.00     Types: Cigarettes     Start date:     Smokeless tobacco: Never   Substance Use Topics    Alcohol use: Never    Drug use: Never     Review of Systems   HENT:  Positive for congestion.    Respiratory:  Positive for choking.    All other systems reviewed and are negative.    Physical Exam     Initial Vitals [23 0809]   BP Pulse Resp Temp SpO2   (!) 145/98 101 18 98.6 °F (37 °C) 99 %      MAP       --         Physical Exam    Nursing note and vitals reviewed.  Constitutional: She appears well-developed and well-nourished.   HENT:   Head: Normocephalic.   Eyes: Pupils are equal, round, and reactive to light.    Cardiovascular:  Normal rate.           Pulmonary/Chest: Breath sounds normal.   Abdominal: Abdomen is soft.   Musculoskeletal:         General: Normal range of motion.     Neurological: She is alert.   Skin: Skin is warm. Capillary refill takes less than 2 seconds.   Psychiatric: She has a normal mood and affect.       Medical Screening Exam   See Full Note    ED Course   Procedures  Labs Reviewed - No data to display       Imaging Results    None          Medications - No data to display              ED Course as of 03/31/23 0851   Fri Mar 31, 2023   0850 Medical decision-making:  Differential diagnosis includes viral upper respiratory infection, allergic rhinitis, sinusitis, bronchitis. [BB]   0850 No further medical decision-making was performed on this patient.  Patient was discharged home with instructions to use over-the-counter medications and follow up with primary care provider symptoms persist.  Patient was also counseled on smoking cessation. [BB]      ED Course User Index  [BB] Angelito Max MD          Clinical Impression:   Final diagnoses:  [J06.9] URI with cough and congestion (Primary)  [Z72.0] Tobacco abuse        ED Disposition Condition    Discharge Stable          ED Prescriptions    None       Follow-up Information    None          Angelito Max MD  03/31/23 0851

## 2023-04-20 ENCOUNTER — HOSPITAL ENCOUNTER (EMERGENCY)
Facility: HOSPITAL | Age: 33
Discharge: HOME | End: 2023-04-20
Payer: MEDICAID

## 2023-04-20 VITALS
DIASTOLIC BLOOD PRESSURE: 90 MMHG | RESPIRATION RATE: 18 BRPM | SYSTOLIC BLOOD PRESSURE: 125 MMHG | WEIGHT: 179 LBS | BODY MASS INDEX: 28.77 KG/M2 | TEMPERATURE: 98 F | HEART RATE: 84 BPM | HEIGHT: 66 IN | OXYGEN SATURATION: 97 %

## 2023-04-20 DIAGNOSIS — N76.0 ACUTE VAGINITIS: Primary | ICD-10-CM

## 2023-04-20 LAB
BACTERIA #/AREA URNS HPF: ABNORMAL /HPF
BILIRUB UR QL STRIP: NEGATIVE
CLARITY UR: ABNORMAL
COLOR UR: YELLOW
GLUCOSE UR STRIP-MCNC: NORMAL MG/DL
KETONES UR STRIP-SCNC: 40 MG/DL
LEUKOCYTE ESTERASE UR QL STRIP: NEGATIVE
MUCOUS THREADS #/AREA URNS HPF: ABNORMAL /HPF
NITRITE UR QL STRIP: NEGATIVE
PH UR STRIP: 6 PH UNITS
PROT UR QL STRIP: ABNORMAL
RBC # UR STRIP: ABNORMAL /UL
RBC #/AREA URNS HPF: ABNORMAL /HPF
SP GR UR STRIP: 1.03
SQUAMOUS #/AREA URNS LPF: ABNORMAL /LPF
TRICHOMONAS #/AREA URNS HPF: ABNORMAL /HPF
UROBILINOGEN UR STRIP-ACNC: NORMAL MG/DL
WBC #/AREA URNS HPF: ABNORMAL /HPF
YEAST #/AREA URNS HPF: ABNORMAL /HPF

## 2023-04-20 PROCEDURE — 99284 EMERGENCY DEPT VISIT MOD MDM: CPT

## 2023-04-20 PROCEDURE — 99284 EMERGENCY DEPT VISIT MOD MDM: CPT | Mod: ,,, | Performed by: NURSE PRACTITIONER

## 2023-04-20 PROCEDURE — 87086 URINE CULTURE/COLONY COUNT: CPT | Performed by: NURSE PRACTITIONER

## 2023-04-20 PROCEDURE — 99284 PR EMERGENCY DEPT VISIT,LEVEL IV: ICD-10-PCS | Mod: ,,, | Performed by: NURSE PRACTITIONER

## 2023-04-20 PROCEDURE — 81001 URINALYSIS AUTO W/SCOPE: CPT | Performed by: NURSE PRACTITIONER

## 2023-04-20 RX ORDER — FLUCONAZOLE 150 MG/1
150 TABLET ORAL DAILY
Qty: 2 TABLET | Refills: 0 | Status: SHIPPED | OUTPATIENT
Start: 2023-04-20 | End: 2023-04-22

## 2023-04-20 RX ORDER — METRONIDAZOLE 500 MG/1
500 TABLET ORAL EVERY 12 HOURS
Qty: 14 TABLET | Refills: 0 | Status: SHIPPED | OUTPATIENT
Start: 2023-04-20 | End: 2023-04-27

## 2023-04-20 NOTE — ED PROVIDER NOTES
Encounter Date: 2023       History     Chief Complaint   Patient presents with    Rash     32 year old female presents to ED with complaint of rash to perineal area. Patient states rash has been present for approximately 2 weeks with burning, itching, and irritation. Patient states she changed pads approximately 2 weeks ago and symptoms started. She also endorses discharge. Patient states she has tried OTC nystatin and lotions/creams without relief of symptoms.     The history is provided by the patient.   Review of patient's allergies indicates:  No Known Allergies  Past Medical History:   Diagnosis Date    History of gonorrhea     Hypertension     Migraine without aura, intractable, without status migrainosus     Noncompliance with medication regimen     Syncope      Past Surgical History:   Procedure Laterality Date    Abdominal Wall Mass Surgery  2019     SECTION  08/10/2008     SECTION  01/15/2013    DILATION AND CURETTAGE OF UTERUS  2012     Family History   Problem Relation Age of Onset    Hypertension Mother     Hypertension Father     No Known Problems Brother     No Known Problems Sister     No Known Problems Sister     No Known Problems Sister      Social History     Tobacco Use    Smoking status: Every Day     Packs/day: 1.00     Types: Cigarettes     Start date:     Smokeless tobacco: Never   Substance Use Topics    Alcohol use: Never    Drug use: Never     Review of Systems   Genitourinary:  Positive for difficulty urinating, dysuria, vaginal discharge and vaginal pain.   Skin:  Positive for rash. Negative for color change.   All other systems reviewed and are negative.    Physical Exam     Initial Vitals [23 1358]   BP Pulse Resp Temp SpO2   (!) 125/90 84 18 98 °F (36.7 °C) 97 %      MAP       --         Physical Exam    Nursing note and vitals reviewed.  Constitutional: She appears well-developed and well-nourished.   HENT:   Head: Normocephalic and  atraumatic.   Eyes: EOM are normal. Pupils are equal, round, and reactive to light.   Neck: Neck supple.   Normal range of motion.  Cardiovascular:  Normal rate and regular rhythm.           Pulmonary/Chest: She has no wheezes. She has no rhonchi.   Abdominal: Abdomen is soft. She exhibits no distension. There is no abdominal tenderness.   Genitourinary: There is tenderness on the right labia. There is no rash or lesion on the right labia. There is tenderness on the left labia. There is no rash or lesion on the left labia.    Vaginal discharge present.         Musculoskeletal:         General: No tenderness or edema.      Cervical back: Normal range of motion and neck supple.     Lymphadenopathy:     She has no cervical adenopathy.   Neurological: She is alert and oriented to person, place, and time. No sensory deficit.   Skin: Skin is warm and dry. Capillary refill takes less than 2 seconds.   Psychiatric: She has a normal mood and affect. Thought content normal.       Medical Screening Exam   See Full Note    ED Course   Procedures  Labs Reviewed   URINALYSIS, REFLEX TO URINE CULTURE - Abnormal; Notable for the following components:       Result Value    Protein, UA Trace (*)     Ketones, UA 40 (*)     Blood, UA Small (*)     All other components within normal limits   URINALYSIS, MICROSCOPIC - Abnormal; Notable for the following components:    RBC, UA 15-25 (*)     Bacteria, UA Moderate (*)     Yeast, UA Moderate (*)     Squamous Epithelial Cells, UA Few (*)     Mucus, UA Many (*)     All other components within normal limits   CULTURE, URINE          Imaging Results    None          Medications - No data to display  Medical Decision Making:   Initial Assessment:   rash  Differential Diagnosis:   Folliculitis  abscess  Clinical Tests:   Lab Tests: Ordered and Reviewed  ED Management:  MDM    Patient presents for emergent evaluation of acute rash that poses a threat to life and/or bodily function.    In the ED  patient found to have acute vaginitis.    I ordered labs and personally reviewed them.  Labs significant for RBC 15-25, moderate bacteria    Discharge MDM  Patient was discharged in stable condition.  Detailed return precautions discussed.                         Clinical Impression:   Final diagnoses:  [N76.0] Acute vaginitis (Primary)        ED Disposition Condition    Discharge Stable          ED Prescriptions       Medication Sig Dispense Start Date End Date Auth. Provider    metroNIDAZOLE (FLAGYL) 500 MG tablet () Take 1 tablet (500 mg total) by mouth every 12 (twelve) hours. for 7 days 14 tablet 2023 NICKOLAS Hooper    fluconazole (DIFLUCAN) 150 MG Tab () Take 1 tablet (150 mg total) by mouth once daily. for 2 doses 2 tablet 2023 NICKOLAS Hooper          Follow-up Information    None          NICKOLAS Hooper  23 3802

## 2023-04-20 NOTE — Clinical Note
"Elsa HERRING" Roger was seen and treated in our emergency department on 4/20/2023.  She may return to work on 04/21/2023.       If you have any questions or concerns, please don't hesitate to call.      TIM LOVERN BSN RN    "

## 2023-04-20 NOTE — ED TRIAGE NOTES
"Pt presents to ed via pov, c/o a rash in her perineum area. States she used a new brand of pads 2 weeks ago and has noticed a rash since lst week. States it's on "my lips and my marcus, ya know the thing in the middle at the top" states it is irritated, denies itching @ this time.   "

## 2023-04-22 LAB — UA COMPLETE W REFLEX CULTURE PNL UR: NORMAL

## 2023-05-02 ENCOUNTER — HOSPITAL ENCOUNTER (EMERGENCY)
Facility: HOSPITAL | Age: 33
Discharge: HOME | End: 2023-05-02
Payer: COMMERCIAL

## 2023-05-02 VITALS
WEIGHT: 175 LBS | HEART RATE: 105 BPM | DIASTOLIC BLOOD PRESSURE: 97 MMHG | SYSTOLIC BLOOD PRESSURE: 142 MMHG | BODY MASS INDEX: 28.12 KG/M2 | RESPIRATION RATE: 18 BRPM | TEMPERATURE: 98 F | OXYGEN SATURATION: 99 % | HEIGHT: 66 IN

## 2023-05-02 DIAGNOSIS — I10 HYPERTENSION, UNSPECIFIED TYPE: Primary | ICD-10-CM

## 2023-05-02 PROCEDURE — 99284 EMERGENCY DEPT VISIT MOD MDM: CPT | Mod: ,,, | Performed by: NURSE PRACTITIONER

## 2023-05-02 PROCEDURE — 99284 PR EMERGENCY DEPT VISIT,LEVEL IV: ICD-10-PCS | Mod: ,,, | Performed by: NURSE PRACTITIONER

## 2023-05-02 PROCEDURE — 99283 EMERGENCY DEPT VISIT LOW MDM: CPT

## 2023-05-02 RX ORDER — AMLODIPINE BESYLATE 5 MG/1
5 TABLET ORAL DAILY
Qty: 30 TABLET | Refills: 11 | Status: SHIPPED | OUTPATIENT
Start: 2023-05-02 | End: 2023-09-01

## 2023-05-02 NOTE — Clinical Note
"Elsa"LUIS Duran was seen and treated in our emergency department on 5/2/2023.  She may return to work on 05/03/2023.       If you have any questions or concerns, please don't hesitate to call.      NICKOLAS Redd"

## 2023-05-02 NOTE — DISCHARGE INSTRUCTIONS
Keep log of blood pressure.  Schedule follow up with PCP to establish care and recheck blood pressure.   Return to ER with new or worsening symptoms.

## 2023-05-02 NOTE — ED PROVIDER NOTES
Encounter Date: 2023       History     Chief Complaint   Patient presents with    Hypertension            Patient presents to ER with complaint of elevated blood pressure. Patient reports headache and dizziness.  She states she has a home blood pressure monitor and the reading was 200/?.  She takes blood pressure medication but it has changed since she had her baby a few months ago.  She states she has been taking her medication but has been out of her norvasc.  Denies syncope.  No chest pain or shortness of breath.     The history is provided by the patient. No  was used.   Review of patient's allergies indicates:  No Known Allergies  Past Medical History:   Diagnosis Date    History of gonorrhea     Hypertension     Migraine without aura, intractable, without status migrainosus     Noncompliance with medication regimen     Syncope      Past Surgical History:   Procedure Laterality Date    Abdominal Wall Mass Surgery  2019     SECTION  08/10/2008     SECTION  01/15/2013    DILATION AND CURETTAGE OF UTERUS  2012     Family History   Problem Relation Age of Onset    Hypertension Mother     Hypertension Father     No Known Problems Brother     No Known Problems Sister     No Known Problems Sister     No Known Problems Sister      Social History     Tobacco Use    Smoking status: Every Day     Packs/day: 1.00     Types: Cigarettes     Start date:     Smokeless tobacco: Never   Substance Use Topics    Alcohol use: Never    Drug use: Never     Review of Systems   Constitutional:  Positive for activity change and fatigue.   Neurological:  Positive for dizziness and headaches.   All other systems reviewed and are negative.    Physical Exam     Initial Vitals [23 1619]   BP Pulse Resp Temp SpO2   (!) 142/97 105 18 97.9 °F (36.6 °C) 99 %      MAP       --         Physical Exam    Nursing note and vitals reviewed.  Constitutional: She appears well-developed and  well-nourished.   HENT:   Head: Normocephalic.   Right Ear: External ear normal.   Left Ear: External ear normal.   Nose: Nose normal.   Eyes: EOM are normal.   Neck:   Normal range of motion.  Cardiovascular:  Normal rate, regular rhythm, normal heart sounds and intact distal pulses.           Pulmonary/Chest: Breath sounds normal.   Abdominal: Bowel sounds are normal.   Musculoskeletal:         General: Normal range of motion.      Cervical back: Normal range of motion.     Neurological: She is alert and oriented to person, place, and time. She has normal strength. GCS score is 15. GCS eye subscore is 4. GCS verbal subscore is 5. GCS motor subscore is 6.   Skin: Skin is warm and dry. Capillary refill takes less than 2 seconds.   Psychiatric: She has a normal mood and affect. Her behavior is normal. Judgment and thought content normal.       Medical Screening Exam   See Full Note    ED Course   Procedures  Labs Reviewed - No data to display       Imaging Results    None          Medications - No data to display  Medical Decision Making:   Initial Assessment:   Patient presents to ER with complaint of elevated blood pressure. Patient reports headache and dizziness.  She states she has a home blood pressure monitor and the reading was 200/?.  She takes blood pressure medication but it has changed since she had her baby a few months ago.  She states she has been taking her medication but has been out of her norvasc.  Denies syncope.  No chest pain or shortness of breath.     Differential Diagnosis:   Hypertension  ED Management:  After initial exam it is determined no lab work or xrays are needed at this time.     Patient is discharged home with dx of hypertension.  She is given a refill for her norvasc.  She is told to monitor blood pressure and f/u with her PCP in 1 week for recheck.  Patient verbalizes understanding and agrees with plan of care.            ED Course as of 06/04/23 1109   Tue May 02, 2023   9419  BP(!): 142/97 [CQ]      ED Course User Index  [CQ] NICKOLAS Redd                Clinical Impression:   Final diagnoses:  [I10] Hypertension, unspecified type (Primary)        ED Disposition Condition    Discharge Stable          ED Prescriptions       Medication Sig Dispense Start Date End Date Auth. Provider    amLODIPine (NORVASC) 5 MG tablet Take 1 tablet (5 mg total) by mouth once daily. 30 tablet 5/2/2023 5/1/2024 NICKOLAS Redd          Follow-up Information       Follow up With Specialties Details Why Contact Info    [facility] Schedule an appointment as soon as possible for a visit in 3 days  [address] [place] [address] Primary Care  [facility] [id number]  [phone]               NICKOLAS Redd  06/04/23 9492

## 2023-05-29 ENCOUNTER — HOSPITAL ENCOUNTER (EMERGENCY)
Facility: HOSPITAL | Age: 33
Discharge: HOME | End: 2023-05-29
Payer: COMMERCIAL

## 2023-05-29 VITALS
TEMPERATURE: 98 F | HEIGHT: 66 IN | DIASTOLIC BLOOD PRESSURE: 90 MMHG | HEART RATE: 64 BPM | OXYGEN SATURATION: 100 % | WEIGHT: 169.5 LBS | BODY MASS INDEX: 27.24 KG/M2 | SYSTOLIC BLOOD PRESSURE: 136 MMHG | RESPIRATION RATE: 17 BRPM

## 2023-05-29 DIAGNOSIS — R51.9 ACUTE NONINTRACTABLE HEADACHE, UNSPECIFIED HEADACHE TYPE: Primary | ICD-10-CM

## 2023-05-29 PROBLEM — Z3A.32 32 WEEKS GESTATION OF PREGNANCY: Status: RESOLVED | Noted: 2022-06-17 | Resolved: 2023-05-29

## 2023-05-29 LAB
ANION GAP SERPL CALCULATED.3IONS-SCNC: 12 MMOL/L (ref 7–16)
B-HCG UR QL: NEGATIVE
BASOPHILS # BLD AUTO: 0.04 K/UL (ref 0–0.2)
BASOPHILS NFR BLD AUTO: 0.8 % (ref 0–1)
BUN SERPL-MCNC: 11 MG/DL (ref 7–18)
BUN/CREAT SERPL: 14 (ref 6–20)
CALCIUM SERPL-MCNC: 8.6 MG/DL (ref 8.5–10.1)
CHLORIDE SERPL-SCNC: 107 MMOL/L (ref 98–107)
CO2 SERPL-SCNC: 25 MMOL/L (ref 21–32)
CREAT SERPL-MCNC: 0.77 MG/DL (ref 0.55–1.02)
CTP QC/QA: YES
DIFFERENTIAL METHOD BLD: ABNORMAL
EGFR (NO RACE VARIABLE) (RUSH/TITUS): 105 ML/MIN/1.73M2
EOSINOPHIL # BLD AUTO: 0.05 K/UL (ref 0–0.5)
EOSINOPHIL NFR BLD AUTO: 1 % (ref 1–4)
ERYTHROCYTE [DISTWIDTH] IN BLOOD BY AUTOMATED COUNT: 16.7 % (ref 11.5–14.5)
FLUAV AG UPPER RESP QL IA.RAPID: NEGATIVE
FLUBV AG UPPER RESP QL IA.RAPID: NEGATIVE
GLUCOSE SERPL-MCNC: 98 MG/DL (ref 74–106)
HCT VFR BLD AUTO: 36.1 % (ref 38–47)
HGB BLD-MCNC: 10.9 G/DL (ref 12–16)
IMM GRANULOCYTES # BLD AUTO: 0.01 K/UL (ref 0–0.04)
IMM GRANULOCYTES NFR BLD: 0.2 % (ref 0–0.4)
LYMPHOCYTES # BLD AUTO: 1.92 K/UL (ref 1–4.8)
LYMPHOCYTES NFR BLD AUTO: 36.9 % (ref 27–41)
MCH RBC QN AUTO: 26 PG (ref 27–31)
MCHC RBC AUTO-ENTMCNC: 30.2 G/DL (ref 32–36)
MCV RBC AUTO: 86.2 FL (ref 80–96)
MONOCYTES # BLD AUTO: 0.25 K/UL (ref 0–0.8)
MONOCYTES NFR BLD AUTO: 4.8 % (ref 2–6)
MPC BLD CALC-MCNC: 10.1 FL (ref 9.4–12.4)
NEUTROPHILS # BLD AUTO: 2.94 K/UL (ref 1.8–7.7)
NEUTROPHILS NFR BLD AUTO: 56.3 % (ref 53–65)
NRBC # BLD AUTO: 0 X10E3/UL
NRBC, AUTO (.00): 0 %
PLATELET # BLD AUTO: 311 K/UL (ref 150–400)
POTASSIUM SERPL-SCNC: 3.7 MMOL/L (ref 3.5–5.1)
RAPID GROUP A STREP: NEGATIVE
RBC # BLD AUTO: 4.19 M/UL (ref 4.2–5.4)
SARS-COV+SARS-COV-2 AG RESP QL IA.RAPID: NEGATIVE
SODIUM SERPL-SCNC: 140 MMOL/L (ref 136–145)
WBC # BLD AUTO: 5.21 K/UL (ref 4.5–11)

## 2023-05-29 PROCEDURE — 99284 EMERGENCY DEPT VISIT MOD MDM: CPT | Mod: ,,, | Performed by: NURSE PRACTITIONER

## 2023-05-29 PROCEDURE — 63600175 PHARM REV CODE 636 W HCPCS: Performed by: NURSE PRACTITIONER

## 2023-05-29 PROCEDURE — 81025 URINE PREGNANCY TEST: CPT | Performed by: NURSE PRACTITIONER

## 2023-05-29 PROCEDURE — 99284 PR EMERGENCY DEPT VISIT,LEVEL IV: ICD-10-PCS | Mod: ,,, | Performed by: NURSE PRACTITIONER

## 2023-05-29 PROCEDURE — 87880 STREP A ASSAY W/OPTIC: CPT | Performed by: NURSE PRACTITIONER

## 2023-05-29 PROCEDURE — 96372 THER/PROPH/DIAG INJ SC/IM: CPT | Performed by: NURSE PRACTITIONER

## 2023-05-29 PROCEDURE — 85025 COMPLETE CBC W/AUTO DIFF WBC: CPT | Performed by: NURSE PRACTITIONER

## 2023-05-29 PROCEDURE — 99284 EMERGENCY DEPT VISIT MOD MDM: CPT

## 2023-05-29 PROCEDURE — 25000003 PHARM REV CODE 250: Performed by: NURSE PRACTITIONER

## 2023-05-29 PROCEDURE — 87428 SARSCOV & INF VIR A&B AG IA: CPT | Performed by: NURSE PRACTITIONER

## 2023-05-29 PROCEDURE — 80048 BASIC METABOLIC PNL TOTAL CA: CPT | Performed by: NURSE PRACTITIONER

## 2023-05-29 RX ORDER — KETOROLAC TROMETHAMINE 30 MG/ML
30 INJECTION, SOLUTION INTRAMUSCULAR; INTRAVENOUS
Status: COMPLETED | OUTPATIENT
Start: 2023-05-29 | End: 2023-05-29

## 2023-05-29 RX ORDER — ONDANSETRON 4 MG/1
4 TABLET, ORALLY DISINTEGRATING ORAL
Status: COMPLETED | OUTPATIENT
Start: 2023-05-29 | End: 2023-05-29

## 2023-05-29 RX ADMIN — KETOROLAC TROMETHAMINE 30 MG: 30 INJECTION, SOLUTION INTRAMUSCULAR; INTRAVENOUS at 11:05

## 2023-05-29 RX ADMIN — ONDANSETRON 4 MG: 4 TABLET, ORALLY DISINTEGRATING ORAL at 11:05

## 2023-05-29 NOTE — Clinical Note
"Corinna LANEMAYI Duran was seen and treated in our emergency department on 5/29/2023.  She may return to work on 05/30/2023.       If you have any questions or concerns, please don't hesitate to call.      NICKOLAS Shah"

## 2023-05-30 NOTE — ED PROVIDER NOTES
Encounter Date: 2023       History     Chief Complaint   Patient presents with    Headache     Pt states headache, sore throat, and dizziness that started Friday. Denies taking any meds pta.     Sore Throat     31 y/o AAF with PMH of HTN, Migraine, Anemia and hx of non compliance presents to the emergency department with c/o headache, sore throat and dizziness. States symptoms started Friday or Saturday. States she has taken nothing for her symptoms. Denies fevers or chills. States she is no longer dizzy but still having a headache. States this is NOT worst headache of her life. She denies vision changes, photophobia, n/v.    The history is provided by the patient and medical records.   Review of patient's allergies indicates:  No Known Allergies  Past Medical History:   Diagnosis Date    History of gonorrhea     Hypertension     Migraine without aura, intractable, without status migrainosus     Noncompliance with medication regimen     Syncope      Past Surgical History:   Procedure Laterality Date    Abdominal Wall Mass Surgery  2019     SECTION  08/10/2008     SECTION  01/15/2013    DILATION AND CURETTAGE OF UTERUS  2012     Family History   Problem Relation Age of Onset    Hypertension Mother     Hypertension Father     No Known Problems Brother     No Known Problems Sister     No Known Problems Sister     No Known Problems Sister      Social History     Tobacco Use    Smoking status: Every Day     Packs/day: 1.00     Types: Cigarettes     Start date:     Smokeless tobacco: Never   Substance Use Topics    Alcohol use: Never    Drug use: Never     Review of Systems   Constitutional:  Negative for chills and fever.   HENT:  Positive for sore throat.    Eyes:  Negative for photophobia and visual disturbance.   Respiratory:  Negative for cough.    Cardiovascular:  Negative for chest pain and palpitations.   Gastrointestinal:  Negative for abdominal pain, nausea and vomiting.    Genitourinary:  Negative for dysuria and hematuria.   Musculoskeletal:  Negative for arthralgias.   Neurological:  Positive for headaches. Negative for dizziness and weakness.   All other systems reviewed and are negative.    Physical Exam     Initial Vitals [05/29/23 2141]   BP Pulse Resp Temp SpO2   (!) 134/90 82 16 98.3 °F (36.8 °C) 98 %      MAP       --         Physical Exam    Constitutional: She appears well-developed and well-nourished. She is cooperative.   HENT:   Right Ear: Tympanic membrane normal.   Left Ear: Tympanic membrane normal.   Nose: Nose normal.   Mouth/Throat: Oropharynx is clear and moist and mucous membranes are normal.   Eyes: Conjunctivae and EOM are normal. Pupils are equal, round, and reactive to light.   Neck: No Brudzinski's sign and no Kernig's sign noted.   Cardiovascular:  Normal rate, regular rhythm, normal heart sounds and normal pulses.           Pulmonary/Chest: Effort normal and breath sounds normal.   Abdominal: Abdomen is soft. Bowel sounds are normal. There is no abdominal tenderness.   Musculoskeletal:      Cervical back: No muscular tenderness.     Neurological: She is alert and oriented to person, place, and time. GCS eye subscore is 4. GCS verbal subscore is 5. GCS motor subscore is 6.   afocal   Skin: Skin is warm, dry and intact. Capillary refill takes less than 2 seconds.   Psychiatric: She has a normal mood and affect. Her speech is normal and behavior is normal. Judgment and thought content normal. Cognition and memory are normal.       Medical Screening Exam   See Full Note    ED Course   Procedures  Labs Reviewed   CBC WITH DIFFERENTIAL - Abnormal; Notable for the following components:       Result Value    RBC 4.19 (*)     Hemoglobin 10.9 (*)     Hematocrit 36.1 (*)     MCH 26.0 (*)     MCHC 30.2 (*)     RDW 16.7 (*)     All other components within normal limits   THROAT SCREEN, RAPID STREP - Normal   SARS-COV2 (COVID) W/ FLU ANTIGEN - Normal    Narrative:      Negative SARS-CoV results should not be used as the sole basis for treatment or patient management decisions; negative results should be considered in the context of a patient's recent exposures, history and the presene of clinical signs and symptoms consistent with COVID-19.  Negative results should be treated as presumptive and confirmed by molecular assay, if necessary for patient management.   BASIC METABOLIC PANEL - Normal   CBC W/ AUTO DIFFERENTIAL    Narrative:     The following orders were created for panel order CBC auto differential.  Procedure                               Abnormality         Status                     ---------                               -----------         ------                     CBC with Differential[[id number]]        Abnormal            Final result                 Please view results for these tests on the individual orders.   POCT URINE PREGNANCY          Imaging Results    None          Medications   ketorolac injection 30 mg (has no administration in time range)   ondansetron disintegrating tablet 4 mg (has no administration in time range)     Medical Decision Making:   Initial Assessment:   33 y/o AAF with PMH of HTN, Migraine, Anemia and hx of non compliance presents to the emergency department with c/o headache, sore throat and dizziness. States symptoms started Friday or Saturday. States she has taken nothing for her symptoms. Denies fevers or chills. States she is no longer dizzy but still having a headache. States this is NOT worst headache of her life. She denies vision changes, photophobia, n/v.  Differential Diagnosis:   Migraine  URI  Strep pharyngitis  Viral illness  Vs other  Clinical Tests:   Lab Tests: Ordered and Reviewed  ED Management:  Work up essentially unremarkable. Pt with anemia but no worse than baseline. WBC count normal. Strep, flu, COVID negative. IM toradol and zofran ODT given for headache. Counseled on supportive measures and importance of  compliance and f/u. Warning s/s discussed and return precautions given; the patient has v/u.                         Clinical Impression:   Final diagnoses:  [R51.9] Acute nonintractable headache, unspecified headache type (Primary)        ED Disposition Condition    Discharge Stable          ED Prescriptions    None       Follow-up Information       Follow up With Specialties Details Why Contact Info    Primary Care Provider   As needed              NICKOLAS Shah  05/29/23 8988

## 2023-05-30 NOTE — DISCHARGE INSTRUCTIONS
Follow up with neurology and/or primary care provider as previously instructed. Continue supportive measures as per neurology. Ensure you are drinking plenty of fluids, especially water. Return to the ED for worsening signs and symptoms or otherwise as needed.

## 2023-06-07 ENCOUNTER — HOSPITAL ENCOUNTER (EMERGENCY)
Facility: HOSPITAL | Age: 33
Discharge: HOME | End: 2023-06-07
Payer: COMMERCIAL

## 2023-06-07 VITALS
DIASTOLIC BLOOD PRESSURE: 70 MMHG | TEMPERATURE: 99 F | HEIGHT: 66 IN | RESPIRATION RATE: 16 BRPM | SYSTOLIC BLOOD PRESSURE: 118 MMHG | HEART RATE: 90 BPM | WEIGHT: 170 LBS | BODY MASS INDEX: 27.32 KG/M2 | OXYGEN SATURATION: 98 %

## 2023-06-07 DIAGNOSIS — J18.9 PNEUMONIA OF LEFT LOWER LOBE DUE TO INFECTIOUS ORGANISM: Primary | ICD-10-CM

## 2023-06-07 DIAGNOSIS — R05.9 COUGH: ICD-10-CM

## 2023-06-07 LAB
FLUAV AG UPPER RESP QL IA.RAPID: NEGATIVE
FLUBV AG UPPER RESP QL IA.RAPID: NEGATIVE
RAPID GROUP A STREP: NEGATIVE
SARS-COV+SARS-COV-2 AG RESP QL IA.RAPID: NEGATIVE

## 2023-06-07 PROCEDURE — 87880 STREP A ASSAY W/OPTIC: CPT | Performed by: NURSE PRACTITIONER

## 2023-06-07 PROCEDURE — 96372 THER/PROPH/DIAG INJ SC/IM: CPT | Performed by: NURSE PRACTITIONER

## 2023-06-07 PROCEDURE — 99284 EMERGENCY DEPT VISIT MOD MDM: CPT | Mod: ,,, | Performed by: NURSE PRACTITIONER

## 2023-06-07 PROCEDURE — 63600175 PHARM REV CODE 636 W HCPCS: Performed by: NURSE PRACTITIONER

## 2023-06-07 PROCEDURE — 99284 PR EMERGENCY DEPT VISIT,LEVEL IV: ICD-10-PCS | Mod: ,,, | Performed by: NURSE PRACTITIONER

## 2023-06-07 PROCEDURE — 99284 EMERGENCY DEPT VISIT MOD MDM: CPT | Mod: 25

## 2023-06-07 PROCEDURE — 87428 SARSCOV & INF VIR A&B AG IA: CPT | Performed by: NURSE PRACTITIONER

## 2023-06-07 RX ORDER — DEXAMETHASONE SODIUM PHOSPHATE 4 MG/ML
4 INJECTION, SOLUTION INTRA-ARTICULAR; INTRALESIONAL; INTRAMUSCULAR; INTRAVENOUS; SOFT TISSUE
Status: COMPLETED | OUTPATIENT
Start: 2023-06-07 | End: 2023-06-07

## 2023-06-07 RX ORDER — AZITHROMYCIN 250 MG/1
250 TABLET, FILM COATED ORAL DAILY
Qty: 6 TABLET | Refills: 0 | Status: SHIPPED | OUTPATIENT
Start: 2023-06-07 | End: 2023-09-01

## 2023-06-07 RX ORDER — CEFTRIAXONE 1 G/1
1 INJECTION, POWDER, FOR SOLUTION INTRAMUSCULAR; INTRAVENOUS
Status: COMPLETED | OUTPATIENT
Start: 2023-06-07 | End: 2023-06-07

## 2023-06-07 RX ORDER — METHYLPREDNISOLONE 4 MG/1
TABLET ORAL
Qty: 1 EACH | Refills: 0 | Status: SHIPPED | OUTPATIENT
Start: 2023-06-07 | End: 2023-06-28

## 2023-06-07 RX ADMIN — DEXAMETHASONE SODIUM PHOSPHATE 4 MG: 4 INJECTION, SOLUTION INTRA-ARTICULAR; INTRALESIONAL; INTRAMUSCULAR; INTRAVENOUS; SOFT TISSUE at 08:06

## 2023-06-07 RX ADMIN — CEFTRIAXONE SODIUM 1 G: 1 INJECTION, POWDER, FOR SOLUTION INTRAMUSCULAR; INTRAVENOUS at 08:06

## 2023-06-07 NOTE — Clinical Note
"Elsa Hernandez (ERICA)aaron was seen and treated in our emergency department on 6/7/2023.  She may return to work on 06/08/2023.       If you have any questions or concerns, please don't hesitate to call.      YUSRA RN    "

## 2023-06-07 NOTE — ED PROVIDER NOTES
Encounter Date: 2023       History     Chief Complaint   Patient presents with    Sore Throat     32 year old female presents to ED with complaint of sore throat and chest pain. Patient states she started having a cough on last night with cough causing throat irritation. Patient states chest pain is elicited with cough; pain located to center of chest. Endorses headache. Denies fever, chills, shortness of breath, nausea/vomiting, diarrhea.     The history is provided by the patient. No  was used.   Review of patient's allergies indicates:  No Known Allergies  Past Medical History:   Diagnosis Date    History of gonorrhea     Hypertension     Migraine without aura, intractable, without status migrainosus     Noncompliance with medication regimen     Syncope      Past Surgical History:   Procedure Laterality Date    Abdominal Wall Mass Surgery  2019     SECTION  08/10/2008     SECTION  01/15/2013    DILATION AND CURETTAGE OF UTERUS  2012     Family History   Problem Relation Age of Onset    Hypertension Mother     Hypertension Father     No Known Problems Brother     No Known Problems Sister     No Known Problems Sister     No Known Problems Sister      Social History     Tobacco Use    Smoking status: Every Day     Packs/day: 1.00     Types: Cigarettes     Start date:     Smokeless tobacco: Never   Substance Use Topics    Alcohol use: Never    Drug use: Never     Review of Systems   Constitutional:  Negative for chills, fatigue and fever.   HENT:  Positive for sore throat. Negative for sinus pressure and sinus pain.    Respiratory:  Positive for cough. Negative for shortness of breath.    Cardiovascular:  Positive for chest pain. Negative for palpitations.   Gastrointestinal:  Negative for nausea and vomiting.   Endocrine: Negative for cold intolerance and heat intolerance.   Musculoskeletal:  Negative for arthralgias and gait problem.   Skin:  Negative for  color change and wound.   Neurological:  Negative for dizziness and weakness.   Hematological:  Negative for adenopathy. Does not bruise/bleed easily.   Psychiatric/Behavioral:  Negative for agitation and confusion.      Physical Exam     Initial Vitals [06/07/23 1828]   BP Pulse Resp Temp SpO2   118/70 90 16 98.7 °F (37.1 °C) 98 %      MAP       --         Physical Exam    Nursing note and vitals reviewed.  Constitutional: She appears well-developed and well-nourished.   HENT:   Head: Normocephalic and atraumatic.   Mouth/Throat: Posterior oropharyngeal erythema present.   Eyes: EOM are normal. Pupils are equal, round, and reactive to light.   Neck: Neck supple.   Normal range of motion.  Cardiovascular:  Normal rate and regular rhythm.           No murmur heard.  Pulmonary/Chest: She has no wheezes. She has no rhonchi.   Abdominal: Abdomen is soft. She exhibits no distension. There is no abdominal tenderness.   Musculoskeletal:         General: No tenderness or edema.      Cervical back: Normal range of motion and neck supple.     Lymphadenopathy:     She has no cervical adenopathy.   Neurological: She is alert and oriented to person, place, and time.   Skin: Skin is warm and dry. Capillary refill takes less than 2 seconds.   Psychiatric: She has a normal mood and affect. Thought content normal.       Medical Screening Exam   See Full Note    ED Course   Procedures  Labs Reviewed   THROAT SCREEN, RAPID STREP - Normal   SARS-COV2 (COVID) W/ FLU ANTIGEN - Normal    Narrative:     Negative SARS-CoV results should not be used as the sole basis for treatment or patient management decisions; negative results should be considered in the context of a patient's recent exposures, history and the presene of clinical signs and symptoms consistent with COVID-19.  Negative results should be treated as presumptive and confirmed by molecular assay, if necessary for patient management.          Imaging Results              X-Ray  Chest PA And Lateral (Final result)  Result time 06/07/23 19:35:08      Final result by Remy Robles MD (06/07/23 19:35:08)                   Impression:      Evidence of some mild left lower lobe pneumonia      Electronically signed by: Remy Robles  Date:    06/07/2023  Time:    19:35               Narrative:    EXAMINATION:  XR CHEST PA AND LATERAL    CLINICAL HISTORY:  .  Cough, unspecified    COMPARISON:  June 27, 2021    TECHNIQUE:  PA and lateral views of the chest    FINDINGS:  Cardiac silhouette is not enlarged.  There is no mediastinal mass.  There is no pulmonary vascular engorgement.    There is a small amount of asymmetric patchy density in the left lower lobe which could represent some low-grade pneumonia.  Lungs and pleural spaces are otherwise clear.    Osseous structures are unchanged.  There is mild thoracolumbar scoliosis                                       Medications   cefTRIAXone injection 1 g (has no administration in time range)   dexAMETHasone injection 4 mg (has no administration in time range)     Medical Decision Making:   Initial Assessment:   Sore throat  Chest pain  Differential Diagnosis:   Pharyngitis  Pneumonia  RSV  Flu/Covid  Clinical Tests:   Lab Tests: Reviewed and Ordered  Radiological Study: Ordered and Reviewed  ED Management:  [facility]    Patient presents for emergent evaluation of acute chest pain, sore throat that poses a threat to life and/or bodily function.    In the ED patient found to have acute pneumonia.    I ordered labs and personally reviewed them.  Labs significant for no acute viral processes  I ordered X-rays and personally reviewed them and reviewed the radiologist interpretation.  Xray significant for Evidence of some mild left lower lobe pneumonia .      Discharge MDM  Patient was managed in the ED with IM Rocephin, Decadron.    The response to treatment was good.    Patient was discharged in stable condition.  Detailed return precautions  discussed.                         Clinical Impression:   Final diagnoses:  [R05.9] Cough  [J18.9] Pneumonia of left lower lobe due to infectious organism (Primary)        ED Disposition Condition    Discharge Stable          ED Prescriptions       Medication Sig Dispense Start Date End Date Auth. Provider    azithromycin (Z-SUSAN) 250 MG tablet Take 1 tablet (250 mg total) by mouth once daily. Take first 2 tablets together, then 1 every day until finished. 6 tablet 6/7/2023 -- NICKOLAS Hooper    methylPREDNISolone (MEDROL DOSEPACK) 4 mg tablet Take as directed 1 each 6/7/2023 6/28/2023 NICKOLAS Hooper          Follow-up Information    None          NICKOLAS Hooper  06/07/23 2002

## 2023-06-19 ENCOUNTER — HOSPITAL ENCOUNTER (EMERGENCY)
Facility: HOSPITAL | Age: 33
Discharge: HOME | End: 2023-06-19
Payer: COMMERCIAL

## 2023-06-19 VITALS
HEART RATE: 76 BPM | BODY MASS INDEX: 27.32 KG/M2 | HEIGHT: 66 IN | RESPIRATION RATE: 16 BRPM | DIASTOLIC BLOOD PRESSURE: 111 MMHG | WEIGHT: 170 LBS | SYSTOLIC BLOOD PRESSURE: 157 MMHG | OXYGEN SATURATION: 100 % | TEMPERATURE: 99 F

## 2023-06-19 DIAGNOSIS — R05.9 COUGH: ICD-10-CM

## 2023-06-19 DIAGNOSIS — J40 BRONCHITIS: Primary | ICD-10-CM

## 2023-06-19 PROCEDURE — 99284 PR EMERGENCY DEPT VISIT,LEVEL IV: ICD-10-PCS | Mod: ,,, | Performed by: NURSE PRACTITIONER

## 2023-06-19 PROCEDURE — 63600175 PHARM REV CODE 636 W HCPCS: Performed by: NURSE PRACTITIONER

## 2023-06-19 PROCEDURE — 96372 THER/PROPH/DIAG INJ SC/IM: CPT | Performed by: NURSE PRACTITIONER

## 2023-06-19 PROCEDURE — 99284 EMERGENCY DEPT VISIT MOD MDM: CPT | Mod: 25

## 2023-06-19 PROCEDURE — 25000242 PHARM REV CODE 250 ALT 637 W/ HCPCS: Performed by: NURSE PRACTITIONER

## 2023-06-19 PROCEDURE — 99284 EMERGENCY DEPT VISIT MOD MDM: CPT | Mod: ,,, | Performed by: NURSE PRACTITIONER

## 2023-06-19 PROCEDURE — 87428 SARSCOV & INF VIR A&B AG IA: CPT | Performed by: NURSE PRACTITIONER

## 2023-06-19 RX ORDER — DEXAMETHASONE SODIUM PHOSPHATE 4 MG/ML
4 INJECTION, SOLUTION INTRA-ARTICULAR; INTRALESIONAL; INTRAMUSCULAR; INTRAVENOUS; SOFT TISSUE
Status: COMPLETED | OUTPATIENT
Start: 2023-06-19 | End: 2023-06-19

## 2023-06-19 RX ORDER — METHYLPREDNISOLONE 4 MG/1
TABLET ORAL
Qty: 21 EACH | Refills: 0 | Status: SHIPPED | OUTPATIENT
Start: 2023-06-19 | End: 2023-07-10

## 2023-06-19 RX ORDER — IPRATROPIUM BROMIDE AND ALBUTEROL SULFATE 2.5; .5 MG/3ML; MG/3ML
3 SOLUTION RESPIRATORY (INHALATION)
Status: COMPLETED | OUTPATIENT
Start: 2023-06-19 | End: 2023-06-19

## 2023-06-19 RX ORDER — BENZONATATE 100 MG/1
100 CAPSULE ORAL 3 TIMES DAILY PRN
Qty: 20 CAPSULE | Refills: 0 | Status: SHIPPED | OUTPATIENT
Start: 2023-06-19 | End: 2023-06-29

## 2023-06-19 RX ADMIN — DEXAMETHASONE SODIUM PHOSPHATE 4 MG: 4 INJECTION, SOLUTION INTRA-ARTICULAR; INTRALESIONAL; INTRAMUSCULAR; INTRAVENOUS; SOFT TISSUE at 10:06

## 2023-06-19 RX ADMIN — IPRATROPIUM BROMIDE AND ALBUTEROL SULFATE 3 ML: 2.5; .5 SOLUTION RESPIRATORY (INHALATION) at 10:06

## 2023-06-19 NOTE — Clinical Note
"Elsa"LUIS Duran was seen and treated in our emergency department on 6/19/2023.  She may return to work on 06/20/2023.       If you have any questions or concerns, please don't hesitate to call.      Digna Engle, NICKOLAS"

## 2023-06-20 NOTE — ED PROVIDER NOTES
Encounter Date: 2023       History     Chief Complaint   Patient presents with    Sore Throat    Nausea     Patient presents to the ER with complaint of fatigue and cough.  Patient reports she was treated for pneumonia on 2023.  She states she did not take all her antibiotics that she was prescribed at that time.  She states her symptoms did improve.  She reports that she was still having a cough.  She reports generalized fatigue.  She denies fever.  She denies vomiting or diarrhea she reports she has felt nauseated at times.  She denies feeling nauseated currently.  She reports she does need an excuse for work.    The history is provided by the patient. No  was used.   Review of patient's allergies indicates:  No Known Allergies  Past Medical History:   Diagnosis Date    History of gonorrhea     Hypertension     Migraine without aura, intractable, without status migrainosus     Noncompliance with medication regimen     Syncope      Past Surgical History:   Procedure Laterality Date    Abdominal Wall Mass Surgery  2019     SECTION  08/10/2008     SECTION  01/15/2013    DILATION AND CURETTAGE OF UTERUS  2012     Family History   Problem Relation Age of Onset    Hypertension Mother     Hypertension Father     No Known Problems Brother     No Known Problems Sister     No Known Problems Sister     No Known Problems Sister      Social History     Tobacco Use    Smoking status: Every Day     Packs/day: 1.00     Types: Cigarettes     Start date:     Smokeless tobacco: Never   Substance Use Topics    Alcohol use: Never    Drug use: Never     Review of Systems   Constitutional:  Positive for activity change, appetite change and fatigue.   Respiratory:  Positive for cough and wheezing.    Gastrointestinal:  Positive for nausea.   All other systems reviewed and are negative.    Physical Exam     Initial Vitals [23 1901]   BP Pulse Resp Temp SpO2   (!) 140/99  94 18 98.5 °F (36.9 °C) 99 %      MAP       --         Physical Exam    Nursing note and vitals reviewed.  Constitutional: She appears well-developed and well-nourished.   HENT:   Head: Normocephalic.   Right Ear: External ear normal.   Left Ear: External ear normal.   Nose: Nose normal.   Mouth/Throat: Oropharyngeal exudate present.   Eyes: Conjunctivae and EOM are normal. Pupils are equal, round, and reactive to light.   Neck: Neck supple.   Normal range of motion.  Cardiovascular:  Normal rate, regular rhythm, normal heart sounds and intact distal pulses.           Pulmonary/Chest: She has wheezes.   Abdominal: Abdomen is soft. Bowel sounds are normal.   Musculoskeletal:         General: Normal range of motion.      Cervical back: Normal range of motion and neck supple.     Neurological: She is alert and oriented to person, place, and time. She has normal strength. GCS score is 15. GCS eye subscore is 4. GCS verbal subscore is 5. GCS motor subscore is 6.   Skin: Skin is warm and dry. Capillary refill takes less than 2 seconds.   Psychiatric: She has a normal mood and affect. Her behavior is normal. Judgment and thought content normal.       Medical Screening Exam   See Full Note    ED Course   Procedures  Labs Reviewed   SARS-COV2 (COVID) W/ FLU ANTIGEN - Normal    Narrative:     Negative SARS-CoV results should not be used as the sole basis for treatment or patient management decisions; negative results should be considered in the context of a patient's recent exposures, history and the presene of clinical signs and symptoms consistent with COVID-19.  Negative results should be treated as presumptive and confirmed by molecular assay, if necessary for patient management.          Imaging Results              X-Ray Chest PA And Lateral (In process)                      Medications   dexAMETHasone injection 4 mg (4 mg Intramuscular Given 6/19/23 2229)   albuterol-ipratropium 2.5 mg-0.5 mg/3 mL nebulizer solution 3  mL (3 mLs Nebulization Given 6/19/23 6400)     Medical Decision Making:   Initial Assessment:   Patient presents to the ER with complaint of fatigue and cough.  Patient reports she was treated for pneumonia on 06/07/2023.  She states she did not take all her antibiotics that she was prescribed at that time.  She states her symptoms did improve.  She reports that she was still having a cough.  She reports generalized fatigue.  She denies fever.  She denies vomiting or diarrhea she reports she has felt nauseated at times.  She denies feeling nauseated currently.  She reports she does need an excuse for work.    Differential Diagnosis:   Pneumonia  Viral upper respiratory illness  Viral gastroenteritis  Bronchitis  Strep throat  COVID flu  Clinical Tests:   Lab Tests: Ordered and Reviewed       <> Summary of Lab: COVID flu negative  Radiological Study: Ordered and Reviewed  ED Management:  Chest x-ray reactive airway disease/ bronchitis    Decadron 4 mg IM to treat cough and wheezing  DuoNeb to treat wheezing  Patient verbalizes improvement of symptoms prior to discharge    Patient was discharged home with diagnosis of bronchitis and cough.  She was given prescription for Medrol Dosepak and Tessalon Perles.  She was told to follow up with the primary care provider in 2 days if symptoms do not improve with treatment.  She was given excuse to return to work tomorrow as she requested.  She was returned to the ER with new or worsening symptoms.                       Clinical Impression:   Final diagnoses:  [R05.9] Cough  [J40] Bronchitis (Primary)        ED Disposition Condition    Discharge Stable          ED Prescriptions       Medication Sig Dispense Start Date End Date Auth. Provider    methylPREDNISolone (MEDROL DOSEPACK) 4 mg tablet use as directed 21 each 6/19/2023 7/10/2023 NICKOLAS Redd    benzonatate (TESSALON) 100 MG capsule Take 1 capsule (100 mg total) by mouth 3 (three) times daily as needed for Cough.  20 capsule 6/19/2023 6/29/2023 NICKOLAS Redd          Follow-up Information       Follow up With Specialties Details Why Contact Info    Primary care provider  Schedule an appointment as soon as possible for a visit in 2 days If symptoms worsen              NICKOLAS Redd  06/19/23 8282

## 2023-06-20 NOTE — DISCHARGE INSTRUCTIONS
Take medications as prescribed.  Follow up with primary care provider in 24-48 hours if symptoms improve.  Return to the ER with new or worsening symptoms.

## 2023-07-12 ENCOUNTER — HOSPITAL ENCOUNTER (EMERGENCY)
Facility: HOSPITAL | Age: 33
Discharge: HOME | End: 2023-07-12
Payer: COMMERCIAL

## 2023-07-12 VITALS
RESPIRATION RATE: 18 BRPM | HEIGHT: 66 IN | SYSTOLIC BLOOD PRESSURE: 134 MMHG | TEMPERATURE: 98 F | HEART RATE: 91 BPM | OXYGEN SATURATION: 97 % | DIASTOLIC BLOOD PRESSURE: 102 MMHG | BODY MASS INDEX: 27.64 KG/M2 | WEIGHT: 172 LBS

## 2023-07-12 DIAGNOSIS — R07.89 CHEST WALL PAIN: ICD-10-CM

## 2023-07-12 DIAGNOSIS — M79.601 RIGHT ARM PAIN: Primary | ICD-10-CM

## 2023-07-12 PROCEDURE — 96374 THER/PROPH/DIAG INJ IV PUSH: CPT

## 2023-07-12 PROCEDURE — 99283 EMERGENCY DEPT VISIT LOW MDM: CPT | Mod: ,,, | Performed by: NURSE PRACTITIONER

## 2023-07-12 PROCEDURE — 25000003 PHARM REV CODE 250: Performed by: NURSE PRACTITIONER

## 2023-07-12 PROCEDURE — 99284 EMERGENCY DEPT VISIT MOD MDM: CPT | Mod: 25

## 2023-07-12 PROCEDURE — 99283 PR EMERGENCY DEPT VISIT,LEVEL III: ICD-10-PCS | Mod: ,,, | Performed by: NURSE PRACTITIONER

## 2023-07-12 PROCEDURE — 63600175 PHARM REV CODE 636 W HCPCS: Performed by: NURSE PRACTITIONER

## 2023-07-12 PROCEDURE — 96372 THER/PROPH/DIAG INJ SC/IM: CPT | Performed by: NURSE PRACTITIONER

## 2023-07-12 RX ORDER — MORPHINE SULFATE 4 MG/ML
4 INJECTION, SOLUTION INTRAMUSCULAR; INTRAVENOUS
Status: DISCONTINUED | OUTPATIENT
Start: 2023-07-12 | End: 2023-07-12

## 2023-07-12 RX ORDER — TIZANIDINE 4 MG/1
4 TABLET ORAL
Status: COMPLETED | OUTPATIENT
Start: 2023-07-12 | End: 2023-07-12

## 2023-07-12 RX ORDER — HYDROCODONE BITARTRATE AND ACETAMINOPHEN 5; 325 MG/1; MG/1
1 TABLET ORAL EVERY 6 HOURS PRN
Qty: 12 TABLET | Refills: 0 | Status: SHIPPED | OUTPATIENT
Start: 2023-07-12 | End: 2023-09-01

## 2023-07-12 RX ORDER — MORPHINE SULFATE 4 MG/ML
4 INJECTION, SOLUTION INTRAMUSCULAR; INTRAVENOUS
Status: COMPLETED | OUTPATIENT
Start: 2023-07-12 | End: 2023-07-12

## 2023-07-12 RX ORDER — METHOCARBAMOL 500 MG/1
1000 TABLET, FILM COATED ORAL 3 TIMES DAILY
Qty: 30 TABLET | Refills: 0 | Status: SHIPPED | OUTPATIENT
Start: 2023-07-12 | End: 2023-07-17

## 2023-07-12 RX ADMIN — TIZANIDINE 4 MG: 4 TABLET ORAL at 10:07

## 2023-07-12 RX ADMIN — MORPHINE SULFATE 4 MG: 4 INJECTION, SOLUTION INTRAMUSCULAR; INTRAVENOUS at 10:07

## 2023-07-12 NOTE — Clinical Note
"Elsa Hernandez (ERICA)aaron was seen and treated in our emergency department on 7/12/2023.  She may return to work on 07/13/2023.       If you have any questions or concerns, please don't hesitate to call.      SANJUANITA RN    "

## 2023-07-13 NOTE — ED PROVIDER NOTES
Encounter Date: 2023       History     Chief Complaint   Patient presents with    Arm Pain     Right arm pain that radiates up into her neck and chest x 5 days     32 year old female presents to ED with complaint of right arm pain.  Patient states pain has been ongoing for approximately 5 days.  She states pain starts in her right hand and shoots up into her right arm and shoulder.  Patient also reports chest tenderness.  Patient endorses continued use of a knife and overhead use of arm; patient works at a [employer].  Denies other known injury to hand or shoulder.  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  Patient reports use of ibuprofen for pain that has been ineffective.    The history is provided by the patient.   Review of patient's allergies indicates:  No Known Allergies  Past Medical History:   Diagnosis Date    History of gonorrhea     Hypertension     Migraine without aura, intractable, without status migrainosus     Noncompliance with medication regimen     Syncope      Past Surgical History:   Procedure Laterality Date    Abdominal Wall Mass Surgery  2019     SECTION  08/10/2008     SECTION  01/15/2013    DILATION AND CURETTAGE OF UTERUS  2012     Family History   Problem Relation Age of Onset    Hypertension Mother     Hypertension Father     No Known Problems Brother     No Known Problems Sister     No Known Problems Sister     No Known Problems Sister      Social History     Tobacco Use    Smoking status: Every Day     Packs/day: 1.00     Types: Cigarettes     Start date:     Smokeless tobacco: Never   Substance Use Topics    Alcohol use: Never    Drug use: Never     Review of Systems   Constitutional:  Negative for chills and fever.   HENT:  Negative for congestion, sinus pressure and sinus pain.    Eyes:  Negative for photophobia and visual disturbance.   Respiratory:  Negative for cough and  shortness of breath.    Cardiovascular:  Positive for chest pain. Negative for palpitations.   Gastrointestinal:  Negative for abdominal distention, nausea and vomiting.   Genitourinary:  Negative for dysuria and urgency.   Musculoskeletal:  Positive for arthralgias and joint swelling.   Skin:  Positive for color change. Negative for wound.   Neurological:  Positive for numbness. Negative for dizziness and weakness.   Hematological:  Negative for adenopathy. Does not bruise/bleed easily.   Psychiatric/Behavioral:  Negative for agitation and confusion.    All other systems reviewed and are negative.    Physical Exam     Initial Vitals   BP Pulse Resp Temp SpO2   07/12/23 2114 07/12/23 2114 07/12/23 2114 07/12/23 2114 07/12/23 2132   (!) 134/102 91 16 98 °F (36.7 °C) 97 %      MAP       --                Physical Exam    Nursing note and vitals reviewed.  Constitutional: She appears well-developed and well-nourished.   HENT:   Head: Normocephalic and atraumatic.   Eyes: EOM are normal. Pupils are equal, round, and reactive to light.   Neck: Neck supple.   Normal range of motion.  Cardiovascular:  Normal rate and regular rhythm.           No murmur heard.  Pulmonary/Chest: She has no wheezes. She has no rhonchi. She exhibits tenderness.     Abdominal: She exhibits no distension. There is no abdominal tenderness.   Musculoskeletal:         General: Tenderness and edema present.      Right shoulder: Tenderness present. Decreased range of motion.      Right hand: Swelling present. No tenderness. Normal range of motion.      Cervical back: Normal range of motion and neck supple.     Lymphadenopathy:     She has no cervical adenopathy.   Neurological: She is alert and oriented to person, place, and time. No cranial nerve deficit or sensory deficit.   Skin: Skin is warm and dry. Capillary refill takes less than 2 seconds.   Psychiatric: She has a normal mood and affect.       Medical Screening Exam   See Full Note    ED  Course   Procedures  Labs Reviewed - No data to display       Imaging Results    None          Medications   tiZANidine tablet 4 mg (4 mg Oral Given 7/12/23 2209)   morphine injection 4 mg (4 mg Intramuscular Given 7/12/23 2208)     Medical Decision Making:   Initial Assessment:   Right hand pain  Right shoulder pain   Right chest tenderness  Differential Diagnosis:   Muscle strain     ED Management:  [facility]    Patient presents for emergent evaluation of acute arm pain that poses a threat to life and/or bodily function.    In the ED patient found to have acute right arm pain, chest wall pain.      Discharge [facility]  Patient was managed in the ED with IM morphine; p.o. tizanidine.    The response to treatment was good.    Patient was discharged in stable condition.  Detailed return precautions discussed.                         Clinical Impression:   Final diagnoses:  [M79.601] Right arm pain (Primary)  [R07.89] Chest wall pain        ED Disposition Condition    Discharge Stable          ED Prescriptions       Medication Sig Dispense Start Date End Date Auth. Provider    methocarbamoL (ROBAXIN) 500 MG Tab Take 2 tablets (1,000 mg total) by mouth 3 (three) times daily. for 5 days 30 tablet 7/12/2023 7/17/2023 NICKOLAS Hooper    HYDROcodone-acetaminophen (NORCO) 5-325 mg per tablet Take 1 tablet by mouth every 6 (six) hours as needed for Pain. 12 tablet 7/12/2023 -- NICKOLAS Hooper          Follow-up Information    None          NICKOLAS Hooper  07/12/23 3432

## 2023-07-27 ENCOUNTER — HOSPITAL ENCOUNTER (EMERGENCY)
Facility: HOSPITAL | Age: 33
Discharge: HOME | End: 2023-07-27
Payer: COMMERCIAL

## 2023-07-27 VITALS
OXYGEN SATURATION: 100 % | DIASTOLIC BLOOD PRESSURE: 85 MMHG | WEIGHT: 166.5 LBS | BODY MASS INDEX: 26.76 KG/M2 | SYSTOLIC BLOOD PRESSURE: 132 MMHG | TEMPERATURE: 98 F | RESPIRATION RATE: 16 BRPM | HEIGHT: 66 IN | HEART RATE: 88 BPM

## 2023-07-27 DIAGNOSIS — M25.511 RIGHT SHOULDER PAIN, UNSPECIFIED CHRONICITY: ICD-10-CM

## 2023-07-27 DIAGNOSIS — M79.601 RIGHT ARM PAIN: Primary | ICD-10-CM

## 2023-07-27 DIAGNOSIS — R52 PAIN: ICD-10-CM

## 2023-07-27 PROCEDURE — 99284 EMERGENCY DEPT VISIT MOD MDM: CPT | Mod: ,,, | Performed by: NURSE PRACTITIONER

## 2023-07-27 PROCEDURE — 99284 EMERGENCY DEPT VISIT MOD MDM: CPT

## 2023-07-27 PROCEDURE — 96372 THER/PROPH/DIAG INJ SC/IM: CPT | Performed by: NURSE PRACTITIONER

## 2023-07-27 PROCEDURE — 99284 PR EMERGENCY DEPT VISIT,LEVEL IV: ICD-10-PCS | Mod: ,,, | Performed by: NURSE PRACTITIONER

## 2023-07-27 PROCEDURE — 63600175 PHARM REV CODE 636 W HCPCS: Performed by: NURSE PRACTITIONER

## 2023-07-27 RX ORDER — IBUPROFEN 800 MG/1
800 TABLET ORAL EVERY 6 HOURS PRN
Qty: 20 TABLET | Refills: 0 | Status: SHIPPED | OUTPATIENT
Start: 2023-07-27 | End: 2023-10-10

## 2023-07-27 RX ORDER — KETOROLAC TROMETHAMINE 30 MG/ML
30 INJECTION, SOLUTION INTRAMUSCULAR; INTRAVENOUS
Status: COMPLETED | OUTPATIENT
Start: 2023-07-27 | End: 2023-07-27

## 2023-07-27 RX ORDER — ORPHENADRINE CITRATE 30 MG/ML
30 INJECTION INTRAMUSCULAR; INTRAVENOUS
Status: COMPLETED | OUTPATIENT
Start: 2023-07-27 | End: 2023-07-27

## 2023-07-27 RX ORDER — METHOCARBAMOL 500 MG/1
1000 TABLET, FILM COATED ORAL 3 TIMES DAILY
Qty: 30 TABLET | Refills: 0 | Status: SHIPPED | OUTPATIENT
Start: 2023-07-27 | End: 2023-08-01

## 2023-07-27 RX ADMIN — KETOROLAC TROMETHAMINE 30 MG: 30 INJECTION, SOLUTION INTRAMUSCULAR; INTRAVENOUS at 07:07

## 2023-07-27 RX ADMIN — ORPHENADRINE CITRATE 30 MG: 60 INJECTION INTRAMUSCULAR; INTRAVENOUS at 07:07

## 2023-07-27 NOTE — Clinical Note
"Elsa"LUIS Duran was seen and treated in our emergency department on 7/27/2023.  She may return to work on 07/28/2023.       If you have any questions or concerns, please don't hesitate to call.      NICKOLAS Hooper"

## 2023-07-27 NOTE — ED PROVIDER NOTES
Encounter Date: 2023       History     Chief Complaint   Patient presents with    Chronic Pain     32 year old female presents to ED with complaint of right arm pain. Patient states she has been having continued pain to right arm with pain starting in right shoulder and going into her right arm. She states intermittent numbness and tingling with associated swelling. She states she continues to work at [employer] with continued overhead use of right arm. Patient states current episode of arm pain started approximately 1 week ago. She states she took muscle relaxer without relief of symptoms.     No  was used.     Review of patient's allergies indicates:  No Known Allergies  Past Medical History:   Diagnosis Date    History of gonorrhea     Hypertension     Migraine without aura, intractable, without status migrainosus     Noncompliance with medication regimen     Syncope      Past Surgical History:   Procedure Laterality Date    Abdominal Wall Mass Surgery  2019     SECTION  08/10/2008     SECTION  01/15/2013    DILATION AND CURETTAGE OF UTERUS  2012     Family History   Problem Relation Age of Onset    Hypertension Mother     Hypertension Father     No Known Problems Brother     No Known Problems Sister     No Known Problems Sister     No Known Problems Sister      Social History     Tobacco Use    Smoking status: Every Day     Current packs/day: 1.00     Average packs/day: 1 pack/day for 17.6 years (17.6 ttl pk-yrs)     Types: Cigarettes     Start date:     Smokeless tobacco: Never   Substance Use Topics    Alcohol use: Never    Drug use: Never     Review of Systems   Constitutional:  Negative for chills and fever.   HENT:  Negative for sinus pressure and sinus pain.    Eyes:  Negative for photophobia and visual disturbance.   Respiratory:  Negative for cough and shortness of breath.    Cardiovascular:  Negative for chest pain and palpitations.    Gastrointestinal:  Negative for diarrhea, nausea and vomiting.   Endocrine: Negative for cold intolerance and heat intolerance.   Genitourinary:  Negative for difficulty urinating and dysuria.   Musculoskeletal:  Positive for arthralgias. Negative for joint swelling.   Skin:  Negative for color change and wound.   Neurological:  Positive for weakness and numbness. Negative for dizziness.   Hematological:  Negative for adenopathy. Does not bruise/bleed easily.   Psychiatric/Behavioral:  Negative for agitation and confusion.        Physical Exam     Initial Vitals [07/27/23 1656]   BP Pulse Resp Temp SpO2   132/85 88 16 97.8 °F (36.6 °C) 100 %      MAP       --         Physical Exam    Nursing note and vitals reviewed.  Constitutional: She appears well-developed and well-nourished.   HENT:   Head: Normocephalic and atraumatic.   Eyes: EOM are normal. Pupils are equal, round, and reactive to light.   Neck: Neck supple.   Normal range of motion.  Cardiovascular:  Normal rate and regular rhythm.           Pulmonary/Chest: She has no wheezes. She has no rhonchi.   Abdominal: Abdomen is soft. She exhibits no distension. There is no abdominal tenderness.   Musculoskeletal:         General: Tenderness and edema present.        Arms:       Cervical back: Normal range of motion and neck supple.     Lymphadenopathy:     She has no cervical adenopathy.   Neurological: She is alert and oriented to person, place, and time. No cranial nerve deficit or sensory deficit.   Skin: Skin is warm and dry. Capillary refill takes less than 2 seconds.   Psychiatric: She has a normal mood and affect. Thought content normal.         Medical Screening Exam   See Full Note    ED Course   Procedures  Labs Reviewed - No data to display       Imaging Results              X-Ray Cervical Spine AP And Lateral (Final result)  Result time 07/27/23 18:37:02      Final result by Robert Parham DO (07/27/23 18:37:02)                   Impression:       As above.    Point of Service: [facility]      Electronically signed by: Robert Parham  Date:    07/27/2023  Time:    18:37               Narrative:    EXAMINATION:  XR CERVICAL SPINE AP LATERAL    CLINICAL HISTORY:  radiculopathy;    COMPARISON:  None    TECHNIQUE:  Frontal, lateral, and open-mouth odontoid views of the cervical spine.    FINDINGS:  There is straightening of normal cervical lordosis which may be positional or secondary to muscle spasm. There is no significant anterolisthesis or retrolisthesis.  Cervical vertebral body heights and disc spaces appear maintained.                                       X-Ray Shoulder Complete 2 View Right (Final result)  Result time 07/27/23 18:36:27      Final result by Robert Parham DO (07/27/23 18:36:27)                   Impression:      As above.    Point of Service: [facility]      Electronically signed by: Robert Parham  Date:    07/27/2023  Time:    18:36               Narrative:    EXAMINATION:  XR SHOULDER COMPLETE 2 OR MORE VIEWS RIGHT    CLINICAL HISTORY:  Pain, unspecified    COMPARISON:  None    TECHNIQUE:  Frontal views of the right shoulder were obtained in internal and external rotation as well as a scapular Y-view of this shoulder .    FINDINGS:  No convincing acute fracture or dislocation demonstrated. No concerning radiopaque foreign body visualized.                                       Medications   ketorolac injection 30 mg (30 mg Intramuscular Given 7/27/23 1911)   orphenadrine injection 30 mg (30 mg Intramuscular Given 7/27/23 1911)     Medical Decision Making:   Initial Assessment:   Chronic pain  Differential Diagnosis:   Arthritis   Shoulder strain  Clinical Tests:   Radiological Study: Ordered and Reviewed  ED Management:  MDM    Patient presents for emergent evaluation of acute chronic pain that poses a threat to life and/or bodily function.    In the ED patient found to have acute right arm pain, right  shoulder pain.    I ordered X-rays and personally reviewed them and reviewed the radiologist interpretation.  Xray significant for muscle spasms; no acute processes    Discharge MDM  Patient was managed in the ED with IM Toradol, Norflex.    The response to treatment was good.    Patient was discharged in stable condition.  Detailed return precautions discussed.                           Clinical Impression:   Final diagnoses:  [R52] Pain  [M79.601] Right arm pain (Primary)  [M25.511] Right shoulder pain, unspecified chronicity        ED Disposition Condition    Discharge Stable          ED Prescriptions       Medication Sig Dispense Start Date End Date Auth. Provider    ibuprofen (ADVIL,MOTRIN) 800 MG tablet Take 1 tablet (800 mg total) by mouth every 6 (six) hours as needed for Pain. 20 tablet 2023 -- Laura Garcia, NICKOLAS    methocarbamoL (ROBAXIN) 500 MG Tab () Take 2 tablets (1,000 mg total) by mouth 3 (three) times daily. for 5 days 30 tablet 2023 Laura Garcia, NICKOLAS          Follow-up Information    None          Laura Garcia, NICKOLAS  23 [id number]       Laura Garcia, NICKOLAS  23 1237

## 2023-07-27 NOTE — Clinical Note
"Elsa"LUIS Duran was seen and treated in our emergency department on 7/27/2023.  She may return to work on 07/31/2023.       If you have any questions or concerns, please don't hesitate to call.      NICKOLAS Hooper"

## 2023-07-28 NOTE — DISCHARGE INSTRUCTIONS
Follow up with PCP in 48-72 hours; return to ED if any with any new or worsening of symptoms occur.

## 2023-08-01 ENCOUNTER — HOSPITAL ENCOUNTER (EMERGENCY)
Facility: HOSPITAL | Age: 33
Discharge: HOME | End: 2023-08-02
Attending: FAMILY MEDICINE
Payer: COMMERCIAL

## 2023-08-01 DIAGNOSIS — H66.92 LEFT OTITIS MEDIA, UNSPECIFIED OTITIS MEDIA TYPE: ICD-10-CM

## 2023-08-01 DIAGNOSIS — R42 VERTIGO: Primary | ICD-10-CM

## 2023-08-01 PROCEDURE — 99284 EMERGENCY DEPT VISIT MOD MDM: CPT

## 2023-08-01 PROCEDURE — 99284 EMERGENCY DEPT VISIT MOD MDM: CPT | Mod: ,,, | Performed by: FAMILY MEDICINE

## 2023-08-01 PROCEDURE — 99284 PR EMERGENCY DEPT VISIT,LEVEL IV: ICD-10-PCS | Mod: ,,, | Performed by: FAMILY MEDICINE

## 2023-08-01 NOTE — Clinical Note
"Elsa "LUIS Duran was seen and treated in our emergency department on 8/1/2023.  She may return to work on 08/05/2023.       If you have any questions or concerns, please don't hesitate to call.      Paresh Monroe, DO"

## 2023-08-02 ENCOUNTER — TELEPHONE (OUTPATIENT)
Dept: EMERGENCY MEDICINE | Facility: HOSPITAL | Age: 33
End: 2023-08-02
Payer: MEDICAID

## 2023-08-02 VITALS
RESPIRATION RATE: 16 BRPM | HEART RATE: 97 BPM | SYSTOLIC BLOOD PRESSURE: 140 MMHG | OXYGEN SATURATION: 98 % | DIASTOLIC BLOOD PRESSURE: 96 MMHG | TEMPERATURE: 98 F

## 2023-08-02 PROCEDURE — 63600175 PHARM REV CODE 636 W HCPCS: Performed by: FAMILY MEDICINE

## 2023-08-02 RX ORDER — DEXAMETHASONE SODIUM PHOSPHATE 4 MG/ML
4 INJECTION, SOLUTION INTRA-ARTICULAR; INTRALESIONAL; INTRAMUSCULAR; INTRAVENOUS; SOFT TISSUE
Status: COMPLETED | OUTPATIENT
Start: 2023-08-02 | End: 2023-08-02

## 2023-08-02 RX ORDER — MECLIZINE HYDROCHLORIDE 25 MG/1
25 TABLET ORAL 3 TIMES DAILY PRN
Qty: 30 TABLET | Refills: 2 | Status: SHIPPED | OUTPATIENT
Start: 2023-08-02 | End: 2023-08-03 | Stop reason: SDUPTHER

## 2023-08-02 RX ORDER — CEPHALEXIN 500 MG/1
500 CAPSULE ORAL 4 TIMES DAILY
Qty: 20 CAPSULE | Refills: 0 | Status: SHIPPED | OUTPATIENT
Start: 2023-08-02 | End: 2023-08-07

## 2023-08-02 RX ADMIN — DEXAMETHASONE SODIUM PHOSPHATE 4 MG: 4 INJECTION, SOLUTION INTRA-ARTICULAR; INTRALESIONAL; INTRAMUSCULAR; INTRAVENOUS; SOFT TISSUE at 02:08

## 2023-08-02 NOTE — ED PROVIDER NOTES
Encounter Date: 2023       History     Chief Complaint   Patient presents with    Dizziness      Patient comes in with vertigo and history of ear infections.        Review of patient's allergies indicates:  No Known Allergies  Past Medical History:   Diagnosis Date    History of gonorrhea     Hypertension     Migraine without aura, intractable, without status migrainosus     Noncompliance with medication regimen     Syncope      Past Surgical History:   Procedure Laterality Date    Abdominal Wall Mass Surgery  2019     SECTION  08/10/2008     SECTION  01/15/2013    DILATION AND CURETTAGE OF UTERUS  2012     Family History   Problem Relation Age of Onset    Hypertension Mother     Hypertension Father     No Known Problems Brother     No Known Problems Sister     No Known Problems Sister     No Known Problems Sister      Social History     Tobacco Use    Smoking status: Every Day     Current packs/day: 1.00     Average packs/day: 1 pack/day for 17.6 years (17.6 ttl pk-yrs)     Types: Cigarettes     Start date:     Smokeless tobacco: Never   Substance Use Topics    Alcohol use: Never    Drug use: Never     Review of Systems   Constitutional: Negative.  Negative for fever.   HENT: Negative.  Negative for sore throat.    Eyes: Negative.    Respiratory: Negative.  Negative for shortness of breath.    Cardiovascular: Negative.  Negative for chest pain.   Gastrointestinal: Negative.  Negative for nausea.   Endocrine: Negative.    Genitourinary: Negative.  Negative for dysuria.   Musculoskeletal: Negative.  Negative for back pain.   Skin: Negative.  Negative for rash.   Allergic/Immunologic: Negative.    Neurological: Negative.  Negative for weakness.   Hematological: Negative.  Does not bruise/bleed easily.   Psychiatric/Behavioral: Negative.     All other systems reviewed and are negative.      Physical Exam     Initial Vitals [23 2219]   BP Pulse Resp Temp SpO2   138/85 102 18 98  °F (36.7 °C) 98 %      MAP       --         Physical Exam    Constitutional: She appears well-developed and well-nourished.   HENT:   Head: Normocephalic and atraumatic.   Right Ear: External ear normal.   Left Ear: External ear normal.   Nose: Nose normal.   Mouth/Throat: Oropharynx is clear and moist.   Erythematous TM   Eyes: Conjunctivae and EOM are normal. Pupils are equal, round, and reactive to light.   Neck: Neck supple.   Normal range of motion.  Cardiovascular:  Normal rate, regular rhythm, normal heart sounds and intact distal pulses.           Pulmonary/Chest: Breath sounds normal.   Abdominal: Abdomen is soft. Bowel sounds are normal.   Genitourinary:    Vagina and uterus normal.     Musculoskeletal:         General: Normal range of motion.      Cervical back: Normal range of motion and neck supple.     Neurological: She is alert and oriented to person, place, and time. She has normal strength and normal reflexes.   Skin: Skin is warm. Capillary refill takes less than 2 seconds.   Psychiatric: She has a normal mood and affect. Her behavior is normal. Judgment and thought content normal.         Medical Screening Exam   See Full Note    ED Course   Procedures  Labs Reviewed - No data to display       Imaging Results    None          Medications   dexAMETHasone injection 4 mg (has no administration in time range)                             Clinical Impression:   Final diagnoses:  [R42] Vertigo (Primary)  [H66.92] Left otitis media, unspecified otitis media type        ED Disposition Condition    Discharge Stable          ED Prescriptions       Medication Sig Dispense Start Date End Date Auth. Provider    cephALEXin (KEFLEX) 500 MG capsule Take 1 capsule (500 mg total) by mouth 4 (four) times daily. for 5 days 20 capsule 8/2/2023 8/7/2023 Paresh Monroe DO    meclizine (ANTIVERT) 25 mg tablet Take 1 tablet (25 mg total) by mouth 3 (three) times daily as needed. 30 tablet 8/2/2023 -- Paresh ROGERS  Mabel, DO          Follow-up Information    None          Paresh Monroe,   08/02/23 0204

## 2023-08-02 NOTE — ED TRIAGE NOTES
Pt presents to the ED with c/o dizziness. Pt states she thinks its her BP. /85. Pt reports she does take her BP regularly. Does not have a primary care provider. States she sees anyone who will see her at the clinic.

## 2023-08-03 ENCOUNTER — OFFICE VISIT (OUTPATIENT)
Dept: FAMILY MEDICINE | Facility: CLINIC | Age: 33
End: 2023-08-03
Payer: MEDICAID

## 2023-08-03 ENCOUNTER — APPOINTMENT (OUTPATIENT)
Dept: RADIOLOGY | Facility: CLINIC | Age: 33
End: 2023-08-03
Attending: INTERNAL MEDICINE
Payer: MEDICAID

## 2023-08-03 VITALS
HEIGHT: 66 IN | HEART RATE: 92 BPM | RESPIRATION RATE: 17 BRPM | BODY MASS INDEX: 28.28 KG/M2 | WEIGHT: 176 LBS | TEMPERATURE: 98 F | SYSTOLIC BLOOD PRESSURE: 130 MMHG | DIASTOLIC BLOOD PRESSURE: 89 MMHG | OXYGEN SATURATION: 98 %

## 2023-08-03 DIAGNOSIS — Z13.1 SCREENING FOR DIABETES MELLITUS (DM): ICD-10-CM

## 2023-08-03 DIAGNOSIS — R10.9 ABDOMINAL PAIN, UNSPECIFIED ABDOMINAL LOCATION: ICD-10-CM

## 2023-08-03 DIAGNOSIS — R10.9 ABDOMINAL PAIN, UNSPECIFIED ABDOMINAL LOCATION: Primary | ICD-10-CM

## 2023-08-03 DIAGNOSIS — Z13.220 SCREENING FOR LIPID DISORDERS: ICD-10-CM

## 2023-08-03 DIAGNOSIS — R55 SYNCOPE, UNSPECIFIED SYNCOPE TYPE: ICD-10-CM

## 2023-08-03 DIAGNOSIS — Z11.59 NEED FOR HEPATITIS C SCREENING TEST: ICD-10-CM

## 2023-08-03 LAB
ANION GAP SERPL CALCULATED.3IONS-SCNC: 8 MMOL/L (ref 7–16)
BASOPHILS # BLD AUTO: 0.04 K/UL (ref 0–0.2)
BASOPHILS NFR BLD AUTO: 0.5 % (ref 0–1)
BILIRUB UR QL STRIP: NEGATIVE
BUN SERPL-MCNC: 18 MG/DL (ref 7–18)
BUN/CREAT SERPL: 22 (ref 6–20)
CALCIUM SERPL-MCNC: 9.1 MG/DL (ref 8.5–10.1)
CHLORIDE SERPL-SCNC: 109 MMOL/L (ref 98–107)
CHOLEST SERPL-MCNC: 198 MG/DL (ref 0–200)
CHOLEST/HDLC SERPL: 2.9 {RATIO}
CLARITY UR: CLEAR
CO2 SERPL-SCNC: 28 MMOL/L (ref 21–32)
COLOR UR: ABNORMAL
CREAT SERPL-MCNC: 0.82 MG/DL (ref 0.55–1.02)
DIFFERENTIAL METHOD BLD: ABNORMAL
EGFR (NO RACE VARIABLE) (RUSH/TITUS): 98 ML/MIN/1.73M2
EOSINOPHIL # BLD AUTO: 0.02 K/UL (ref 0–0.5)
EOSINOPHIL NFR BLD AUTO: 0.3 % (ref 1–4)
ERYTHROCYTE [DISTWIDTH] IN BLOOD BY AUTOMATED COUNT: 16.5 % (ref 11.5–14.5)
EST. AVERAGE GLUCOSE BLD GHB EST-MCNC: 84 MG/DL
GLUCOSE SERPL-MCNC: 92 MG/DL (ref 74–106)
GLUCOSE UR STRIP-MCNC: NORMAL MG/DL
HBA1C MFR BLD HPLC: 5.1 % (ref 4.5–6.6)
HCT VFR BLD AUTO: 33.2 % (ref 38–47)
HCV AB SER QL: NORMAL
HDLC SERPL-MCNC: 68 MG/DL (ref 40–60)
HGB BLD-MCNC: 10.3 G/DL (ref 12–16)
IMM GRANULOCYTES # BLD AUTO: 0.01 K/UL (ref 0–0.04)
IMM GRANULOCYTES NFR BLD: 0.1 % (ref 0–0.4)
KETONES UR STRIP-SCNC: NEGATIVE MG/DL
LDLC SERPL CALC-MCNC: 120 MG/DL
LDLC/HDLC SERPL: 1.8 {RATIO}
LEUKOCYTE ESTERASE UR QL STRIP: NEGATIVE
LYMPHOCYTES # BLD AUTO: 2.84 K/UL (ref 1–4.8)
LYMPHOCYTES NFR BLD AUTO: 37.4 % (ref 27–41)
MCH RBC QN AUTO: 26.5 PG (ref 27–31)
MCHC RBC AUTO-ENTMCNC: 31 G/DL (ref 32–36)
MCV RBC AUTO: 85.3 FL (ref 80–96)
MONOCYTES # BLD AUTO: 0.47 K/UL (ref 0–0.8)
MONOCYTES NFR BLD AUTO: 6.2 % (ref 2–6)
MPC BLD CALC-MCNC: 10.4 FL (ref 9.4–12.4)
NEUTROPHILS # BLD AUTO: 4.22 K/UL (ref 1.8–7.7)
NEUTROPHILS NFR BLD AUTO: 55.5 % (ref 53–65)
NITRITE UR QL STRIP: NEGATIVE
NONHDLC SERPL-MCNC: 130 MG/DL
NRBC # BLD AUTO: 0 X10E3/UL
NRBC, AUTO (.00): 0 %
PH UR STRIP: 6.5 PH UNITS
PLATELET # BLD AUTO: 357 K/UL (ref 150–400)
POTASSIUM SERPL-SCNC: 3.6 MMOL/L (ref 3.5–5.1)
PROT UR QL STRIP: 10
RBC # BLD AUTO: 3.89 M/UL (ref 4.2–5.4)
RBC # UR STRIP: NEGATIVE /UL
SODIUM SERPL-SCNC: 141 MMOL/L (ref 136–145)
SP GR UR STRIP: 1.03
TRIGL SERPL-MCNC: 49 MG/DL (ref 35–150)
UROBILINOGEN UR STRIP-ACNC: NORMAL MG/DL
VLDLC SERPL-MCNC: 10 MG/DL
WBC # BLD AUTO: 7.6 K/UL (ref 4.5–11)

## 2023-08-03 PROCEDURE — 3079F PR MOST RECENT DIASTOLIC BLOOD PRESSURE 80-89 MM HG: ICD-10-PCS | Mod: CPTII,,, | Performed by: INTERNAL MEDICINE

## 2023-08-03 PROCEDURE — 85025 COMPLETE CBC W/AUTO DIFF WBC: CPT | Mod: ,,, | Performed by: CLINICAL MEDICAL LABORATORY

## 2023-08-03 PROCEDURE — 90471 TDAP VACCINE GREATER THAN OR EQUAL TO 7YO IM: ICD-10-PCS | Mod: ,,, | Performed by: INTERNAL MEDICINE

## 2023-08-03 PROCEDURE — 3008F PR BODY MASS INDEX (BMI) DOCUMENTED: ICD-10-PCS | Mod: CPTII,,, | Performed by: INTERNAL MEDICINE

## 2023-08-03 PROCEDURE — 81003 URINALYSIS AUTO W/O SCOPE: CPT | Mod: QW,,, | Performed by: CLINICAL MEDICAL LABORATORY

## 2023-08-03 PROCEDURE — 90677 PNEUMOCOCCAL CONJUGATE VACCINE 20-VALENT: ICD-10-PCS | Mod: ,,, | Performed by: INTERNAL MEDICINE

## 2023-08-03 PROCEDURE — 3044F HG A1C LEVEL LT 7.0%: CPT | Mod: CPTII,,, | Performed by: INTERNAL MEDICINE

## 2023-08-03 PROCEDURE — 3075F PR MOST RECENT SYSTOLIC BLOOD PRESS GE 130-139MM HG: ICD-10-PCS | Mod: CPTII,,, | Performed by: INTERNAL MEDICINE

## 2023-08-03 PROCEDURE — 80048 BASIC METABOLIC PNL TOTAL CA: CPT | Mod: ,,, | Performed by: CLINICAL MEDICAL LABORATORY

## 2023-08-03 PROCEDURE — 80048 BASIC METABOLIC PANEL: ICD-10-PCS | Mod: ,,, | Performed by: CLINICAL MEDICAL LABORATORY

## 2023-08-03 PROCEDURE — 86803 HEPATITIS C ANTIBODY: ICD-10-PCS | Mod: ,,, | Performed by: CLINICAL MEDICAL LABORATORY

## 2023-08-03 PROCEDURE — 74018 RADEX ABDOMEN 1 VIEW: CPT | Mod: TC,RHCUB | Performed by: INTERNAL MEDICINE

## 2023-08-03 PROCEDURE — 80061 LIPID PANEL: CPT | Mod: ,,, | Performed by: CLINICAL MEDICAL LABORATORY

## 2023-08-03 PROCEDURE — 81003 URINALYSIS, REFLEX TO URINE CULTURE: ICD-10-PCS | Mod: QW,,, | Performed by: CLINICAL MEDICAL LABORATORY

## 2023-08-03 PROCEDURE — 3075F SYST BP GE 130 - 139MM HG: CPT | Mod: CPTII,,, | Performed by: INTERNAL MEDICINE

## 2023-08-03 PROCEDURE — 99214 PR OFFICE/OUTPT VISIT, EST, LEVL IV, 30-39 MIN: ICD-10-PCS | Mod: 25,,, | Performed by: INTERNAL MEDICINE

## 2023-08-03 PROCEDURE — 1160F RVW MEDS BY RX/DR IN RCRD: CPT | Mod: CPTII,,, | Performed by: INTERNAL MEDICINE

## 2023-08-03 PROCEDURE — 1159F PR MEDICATION LIST DOCUMENTED IN MEDICAL RECORD: ICD-10-PCS | Mod: CPTII,,, | Performed by: INTERNAL MEDICINE

## 2023-08-03 PROCEDURE — 1160F PR REVIEW ALL MEDS BY PRESCRIBER/CLIN PHARMACIST DOCUMENTED: ICD-10-PCS | Mod: CPTII,,, | Performed by: INTERNAL MEDICINE

## 2023-08-03 PROCEDURE — 3044F PR MOST RECENT HEMOGLOBIN A1C LEVEL <7.0%: ICD-10-PCS | Mod: CPTII,,, | Performed by: INTERNAL MEDICINE

## 2023-08-03 PROCEDURE — 80061 LIPID PANEL: ICD-10-PCS | Mod: ,,, | Performed by: CLINICAL MEDICAL LABORATORY

## 2023-08-03 PROCEDURE — 83036 HEMOGLOBIN GLYCOSYLATED A1C: CPT | Mod: ,,, | Performed by: CLINICAL MEDICAL LABORATORY

## 2023-08-03 PROCEDURE — 90471 IMMUNIZATION ADMIN: CPT | Mod: ,,, | Performed by: INTERNAL MEDICINE

## 2023-08-03 PROCEDURE — 1159F MED LIST DOCD IN RCRD: CPT | Mod: CPTII,,, | Performed by: INTERNAL MEDICINE

## 2023-08-03 PROCEDURE — 90715 TDAP VACCINE 7 YRS/> IM: CPT | Mod: ,,, | Performed by: INTERNAL MEDICINE

## 2023-08-03 PROCEDURE — 86803 HEPATITIS C AB TEST: CPT | Mod: ,,, | Performed by: CLINICAL MEDICAL LABORATORY

## 2023-08-03 PROCEDURE — 85025 CBC WITH DIFFERENTIAL: ICD-10-PCS | Mod: ,,, | Performed by: CLINICAL MEDICAL LABORATORY

## 2023-08-03 PROCEDURE — 83036 HEMOGLOBIN A1C: ICD-10-PCS | Mod: ,,, | Performed by: CLINICAL MEDICAL LABORATORY

## 2023-08-03 PROCEDURE — 90677 PCV20 VACCINE IM: CPT | Mod: ,,, | Performed by: INTERNAL MEDICINE

## 2023-08-03 PROCEDURE — 3079F DIAST BP 80-89 MM HG: CPT | Mod: CPTII,,, | Performed by: INTERNAL MEDICINE

## 2023-08-03 PROCEDURE — 3008F BODY MASS INDEX DOCD: CPT | Mod: CPTII,,, | Performed by: INTERNAL MEDICINE

## 2023-08-03 PROCEDURE — 99214 OFFICE O/P EST MOD 30 MIN: CPT | Mod: 25,,, | Performed by: INTERNAL MEDICINE

## 2023-08-03 PROCEDURE — 90472 PNEUMOCOCCAL CONJUGATE VACCINE 20-VALENT: ICD-10-PCS | Mod: ,,, | Performed by: INTERNAL MEDICINE

## 2023-08-03 PROCEDURE — 90472 IMMUNIZATION ADMIN EACH ADD: CPT | Mod: ,,, | Performed by: INTERNAL MEDICINE

## 2023-08-03 PROCEDURE — 90715 TDAP VACCINE GREATER THAN OR EQUAL TO 7YO IM: ICD-10-PCS | Mod: ,,, | Performed by: INTERNAL MEDICINE

## 2023-08-03 RX ORDER — TOPIRAMATE 50 MG/1
50 TABLET, FILM COATED ORAL 2 TIMES DAILY
COMMUNITY
Start: 2023-04-17 | End: 2023-08-24 | Stop reason: SDUPTHER

## 2023-08-03 RX ORDER — MECLIZINE HYDROCHLORIDE 25 MG/1
25 TABLET ORAL 2 TIMES DAILY PRN
Qty: 60 TABLET | Refills: 1 | Status: SHIPPED | OUTPATIENT
Start: 2023-08-03 | End: 2023-09-01

## 2023-08-05 ENCOUNTER — HOSPITAL ENCOUNTER (EMERGENCY)
Facility: HOSPITAL | Age: 33
Discharge: HOME | End: 2023-08-05
Payer: MEDICAID

## 2023-08-05 VITALS
RESPIRATION RATE: 16 BRPM | OXYGEN SATURATION: 98 % | HEART RATE: 96 BPM | SYSTOLIC BLOOD PRESSURE: 122 MMHG | WEIGHT: 178 LBS | DIASTOLIC BLOOD PRESSURE: 78 MMHG | BODY MASS INDEX: 28.61 KG/M2 | TEMPERATURE: 98 F | HEIGHT: 66 IN

## 2023-08-05 DIAGNOSIS — G44.89 OTHER HEADACHE SYNDROME: Primary | ICD-10-CM

## 2023-08-05 PROCEDURE — 99282 EMERGENCY DEPT VISIT SF MDM: CPT

## 2023-08-05 PROCEDURE — 99283 PR EMERGENCY DEPT VISIT,LEVEL III: ICD-10-PCS | Mod: ,,, | Performed by: REGISTERED NURSE

## 2023-08-05 PROCEDURE — 99283 EMERGENCY DEPT VISIT LOW MDM: CPT | Mod: ,,, | Performed by: REGISTERED NURSE

## 2023-08-05 NOTE — Clinical Note
"Elsa"LUIS Duran was seen and treated in our emergency department on 8/5/2023.  She may return to work on 08/06/2023.       If you have any questions or concerns, please don't hesitate to call.      Ahmet Cox, ACNP"

## 2023-08-06 NOTE — ED PROVIDER NOTES
Encounter Date: 2023       History     Chief Complaint   Patient presents with    Dizziness     Seen in ER on  for same c/c.  Seen at Dr Dias clinic 8/3.  Got prescription of Antivert.       Headache     31 y/o female c/o headache starting this afternoon w/o vision change or weakness. No OTC meds taken for headache before coming to the ED. Upon exam she states she was worried her BP was high and that is the reason for visit today. BP wnl. Recently saw Dr. Dias for similar symptoms.      Review of patient's allergies indicates:  No Known Allergies  Past Medical History:   Diagnosis Date    History of gonorrhea     Hypertension     Migraine without aura, intractable, without status migrainosus     Noncompliance with medication regimen     Syncope      Past Surgical History:   Procedure Laterality Date    Abdominal Wall Mass Surgery  2019     SECTION  08/10/2008     SECTION  01/15/2013    DILATION AND CURETTAGE OF UTERUS  2012     Family History   Problem Relation Age of Onset    Hypertension Mother     Hypertension Father     No Known Problems Brother     No Known Problems Sister     No Known Problems Sister     No Known Problems Sister      Social History     Tobacco Use    Smoking status: Every Day     Current packs/day: 1.00     Average packs/day: 1 pack/day for 17.6 years (17.6 ttl pk-yrs)     Types: Cigarettes     Start date:     Smokeless tobacco: Never   Substance Use Topics    Alcohol use: Never    Drug use: Never     Review of Systems   Eyes:  Negative for visual disturbance.   Cardiovascular:  Negative for chest pain.   Neurological:  Positive for headaches. Negative for facial asymmetry, weakness, light-headedness and numbness.   All other systems reviewed and are negative.      Physical Exam     Initial Vitals [23 1848]   BP Pulse Resp Temp SpO2   122/78 96 16 98.3 °F (36.8 °C) 98 %      MAP       --         Physical Exam    Constitutional: She appears  well-developed and well-nourished. She is not diaphoretic.   HENT:   Head: Normocephalic and atraumatic.   Eyes: EOM are normal. Pupils are equal, round, and reactive to light.   Neck: Neck supple.   Normal range of motion.  Cardiovascular:  Normal rate, regular rhythm and normal heart sounds.           Abdominal: Abdomen is soft. Bowel sounds are normal.   Musculoskeletal:         General: Normal range of motion.      Cervical back: Normal range of motion and neck supple.     Neurological: She is alert and oriented to person, place, and time. She has normal strength. No cranial nerve deficit or sensory deficit. GCS score is 15. GCS eye subscore is 4. GCS verbal subscore is 5. GCS motor subscore is 6.   Skin: Skin is warm and dry. Capillary refill takes less than 2 seconds.   Psychiatric: She has a normal mood and affect. Her behavior is normal. Judgment and thought content normal.         Medical Screening Exam   See Full Note    ED Course   Procedures  Labs Reviewed - No data to display       Imaging Results    None          Medications - No data to display                          Clinical Impression:   Final diagnoses:  [G44.89] Other headache syndrome (Primary)        ED Disposition Condition    Discharge Stable          ED Prescriptions    None       Follow-up Information    None          Ahmet Cox ACNP  08/05/23 1953

## 2023-08-07 ENCOUNTER — OFFICE VISIT (OUTPATIENT)
Dept: ORTHOPEDICS | Facility: CLINIC | Age: 33
End: 2023-08-07
Payer: MEDICAID

## 2023-08-07 DIAGNOSIS — M54.12 CERVICAL RADICULOPATHY: ICD-10-CM

## 2023-08-07 DIAGNOSIS — M25.511 RIGHT SHOULDER PAIN, UNSPECIFIED CHRONICITY: ICD-10-CM

## 2023-08-07 PROCEDURE — 99214 OFFICE O/P EST MOD 30 MIN: CPT | Mod: PBBFAC | Performed by: ORTHOPAEDIC SURGERY

## 2023-08-07 PROCEDURE — 99204 OFFICE O/P NEW MOD 45 MIN: CPT | Mod: S$PBB,,, | Performed by: ORTHOPAEDIC SURGERY

## 2023-08-07 PROCEDURE — 99204 PR OFFICE/OUTPT VISIT, NEW, LEVL IV, 45-59 MIN: ICD-10-PCS | Mod: S$PBB,,, | Performed by: ORTHOPAEDIC SURGERY

## 2023-08-07 PROCEDURE — 3044F HG A1C LEVEL LT 7.0%: CPT | Mod: ,,, | Performed by: ORTHOPAEDIC SURGERY

## 2023-08-07 PROCEDURE — 1159F MED LIST DOCD IN RCRD: CPT | Mod: ,,, | Performed by: ORTHOPAEDIC SURGERY

## 2023-08-07 PROCEDURE — 3044F PR MOST RECENT HEMOGLOBIN A1C LEVEL <7.0%: ICD-10-PCS | Mod: ,,, | Performed by: ORTHOPAEDIC SURGERY

## 2023-08-07 PROCEDURE — 1159F PR MEDICATION LIST DOCUMENTED IN MEDICAL RECORD: ICD-10-PCS | Mod: ,,, | Performed by: ORTHOPAEDIC SURGERY

## 2023-08-07 NOTE — LETTER
August 7, 2023      [facility]  [address] [place] [address]  Phone: [phone]  Fax: [phone]       Patient: Elsa Duran   YOB: 1990  Date of Visit: 08/07/2023    To Whom It May Concern:    OLI Duran  was at [facility] on 08/07/2023. The patient may return to work/school on 08/08/2023 with no restrictions. If you have any questions or concerns, or if I can be of further assistance, please do not hesitate to contact me.    Sincerely,    MD Lindsey Greenberg MA

## 2023-08-07 NOTE — PROGRESS NOTES
Clinic Note        CC:   Chief Complaint   Patient presents with    Right Shoulder - Pain        Principal problem: No primary diagnosis found.     REASON FOR VISIT:       HISTORY:  32-year-old female complaining of right arm pain and tingling into the top of her hand this been going on for proximally months she has pain over the posterior aspect of his shoulder down her arm to the dorsal aspect of her index long and ring finger      PAST MEDICAL HISTORY:   Past Medical History:   Diagnosis Date    History of gonorrhea     Hypertension     Migraine without aura, intractable, without status migrainosus     Noncompliance with medication regimen     Syncope           PAST SURGICAL HISTORY:   Past Surgical History:   Procedure Laterality Date    Abdominal Wall Mass Surgery  2019     SECTION  08/10/2008     SECTION  01/15/2013    DILATION AND CURETTAGE OF UTERUS  2012          ALLERGIES: Review of patient's allergies indicates:  No Known Allergies     MEDICATIONS :    Current Outpatient Medications:     amLODIPine (NORVASC) 5 MG tablet, Take 5 mg by mouth., Disp: , Rfl:     amLODIPine (NORVASC) 5 MG tablet, Take 1 tablet (5 mg total) by mouth once daily., Disp: 30 tablet, Rfl: 11    azithromycin (Z-SUSAN) 250 MG tablet, Take 1 tablet (250 mg total) by mouth once daily. Take first 2 tablets together, then 1 every day until finished. (Patient not taking: Reported on 8/3/2023), Disp: 6 tablet, Rfl: 0    cephALEXin (KEFLEX) 500 MG capsule, Take 1 capsule (500 mg total) by mouth 4 (four) times daily. for 5 days, Disp: 20 capsule, Rfl: 0    fluticasone propionate (FLONASE) 50 mcg/actuation nasal spray, 1 spray (50 mcg total) by Each Nostril route 2 (two) times daily as needed., Disp: 15 g, Rfl: 0    HYDROcodone-acetaminophen (NORCO) 5-325 mg per tablet, Take 1 tablet by mouth every 6 (six) hours as needed for Pain., Disp: 12 tablet, Rfl: 0    ibuprofen (ADVIL,MOTRIN) 800 MG tablet, Take 1  tablet (800 mg total) by mouth every 6 (six) hours as needed for Pain., Disp: 20 tablet, Rfl: 0    labetaloL (NORMODYNE) 200 MG tablet, Take by mouth., Disp: , Rfl:     loratadine (CLARITIN) 10 mg tablet, Take 1 tablet (10 mg total) by mouth once daily., Disp: 30 tablet, Rfl: 11    meclizine (ANTIVERT) 25 mg tablet, Take 1 tablet (25 mg total) by mouth 2 (two) times daily as needed for Dizziness., Disp: 60 tablet, Rfl: 1    TIFFANY-BE 0.35 mg tablet, Take 1 tablet by mouth., Disp: , Rfl:     propranoloL (INDERAL) 60 MG tablet, Take 1 tablet (60 mg total) by mouth 3 (three) times daily. (Patient not taking: Reported on 1/9/2023), Disp: 90 tablet, Rfl: 11    topiramate (TOPAMAX) 50 MG tablet, Take 50 mg by mouth 2 (two) times daily., Disp: , Rfl:      SOCIAL HISTORY:   Social History     Socioeconomic History    Marital status: Single   Tobacco Use    Smoking status: Every Day     Current packs/day: 1.00     Average packs/day: 1 pack/day for 17.6 years (17.6 ttl pk-yrs)     Types: Cigarettes     Start date: 2006    Smokeless tobacco: Never   Substance and Sexual Activity    Alcohol use: Never    Drug use: Never    Sexual activity: Yes     Partners: Male          FAMILY HISTORY:   Family History   Problem Relation Age of Onset    Hypertension Mother     Hypertension Father     No Known Problems Brother     No Known Problems Sister     No Known Problems Sister     No Known Problems Sister           PHYSICAL EXAM:  In general, this is a well-developed, well-nourished female . The patient is alert, oriented and cooperative.      HEENT:  Normocephalic, atraumatic.  Extraocular movements are intact bilaterally.  The oropharynx is benign.       NECK:  Nontender with good range of motion.      LUNGS:  Clear to auscultation bilaterally.      HEART:  Demonstrates a regular rate and rhythm.  No murmurs appreciated.      ABDOMEN:  Soft, non-tender, non-distended.        EXTREMITIES:  Right upper extremity distally she has motor  function 5/5 sensation intact to touch but she says it is decreased over the dorsal aspect of her hand she has palpable pulses the wrist full range motion of the hand no weakness on gripping.  She has no pain on impingement testing no pain in the shoulder she does have tenderness over her medial border of her scapula and she has pain in her neck and especially on lateral flexion      RADIOGRAPHIC FINDINGS:  X-rays of the shoulder show no fracture subluxation reviewed from 07/27      IMPRESSION: Right shoulder pain, unspecified chronicity  -     Ambulatory referral/consult to Orthopedics    Cervical radiculopathy         PLAN:  Patient has cervical radiculopathy and will get an MRI of her C-spine she is taken anti-inflammatories I will follow back up after the MRI.  She is not having pain in the shoulder.  There are no Patient Instructions on file for this visit.      Follow up in about 6 weeks (around 9/18/2023).         Sameer Kingsley      (Subject to voice recognition error, transcription service not allowed)

## 2023-08-07 NOTE — PATIENT INSTRUCTIONS
MRI cervical spine at [facility] on 0818/23 @ 11:30a.m.  Call Dr. Kingsley's office back 1-2 days following the procedure. Tel#[phone]

## 2023-08-09 PROBLEM — Z11.59 NEED FOR HEPATITIS C SCREENING TEST: Status: ACTIVE | Noted: 2023-08-09

## 2023-08-09 PROBLEM — R55 SYNCOPE: Status: ACTIVE | Noted: 2023-08-09

## 2023-08-09 PROBLEM — Z13.220 SCREENING FOR LIPID DISORDERS: Status: ACTIVE | Noted: 2023-08-09

## 2023-08-09 PROBLEM — Z13.1 SCREENING FOR DIABETES MELLITUS (DM): Status: ACTIVE | Noted: 2023-08-09

## 2023-08-09 PROBLEM — R10.9 ABDOMINAL PAIN: Status: ACTIVE | Noted: 2023-08-09

## 2023-08-09 NOTE — PROGRESS NOTES
Subjective:       Patient ID: Elsa Duran is a 32 y.o. female.    Chief Complaint: Abdominal Pain (Under stomach ) and Health Maintenance (Care Gaps discussed Pt wants to be screen for all care gaps and tetanus vaccine and pnuem vaccine )  Patient presents complaining of dizziness abdominal pain patient states that she is had a near syncopal episode patient has chronic hypertension and chronic abdominal pain for proximally 1 year.  The plan refer to neurology.  For the abdominal pain will check a urinalysis BNP CBC.  Health maintenance lipid panel hemoglobin A1c.  Also the patient complains that the abdominal pain has progressively worse she rates it a 10/10 will also order KUB CT scan of the abdomen and pelvis.  For the dizziness and vertigo will order a CT scan of the brain.  Start Antivert 25 mg 1 p.o. b.i.d. p.r.n. dizziness.    Health maintenance issues hepatitis C tetanus toxoid vaccine and pneumococcal vaccine today.  Additional plans for the abdominal pain will check a UA CNS.  Abdominal Pain  Pertinent negatives include no constipation, fever or frequency.     .    Current Medications:    Current Outpatient Medications:     amLODIPine (NORVASC) 5 MG tablet, Take 1 tablet (5 mg total) by mouth once daily., Disp: 30 tablet, Rfl: 11    fluticasone propionate (FLONASE) 50 mcg/actuation nasal spray, 1 spray (50 mcg total) by Each Nostril route 2 (two) times daily as needed., Disp: 15 g, Rfl: 0    HYDROcodone-acetaminophen (NORCO) 5-325 mg per tablet, Take 1 tablet by mouth every 6 (six) hours as needed for Pain., Disp: 12 tablet, Rfl: 0    ibuprofen (ADVIL,MOTRIN) 800 MG tablet, Take 1 tablet (800 mg total) by mouth every 6 (six) hours as needed for Pain., Disp: 20 tablet, Rfl: 0    labetaloL (NORMODYNE) 200 MG tablet, Take by mouth., Disp: , Rfl:     loratadine (CLARITIN) 10 mg tablet, Take 1 tablet (10 mg total) by mouth once daily., Disp: 30 tablet, Rfl: 11    meclizine (ANTIVERT) 25 mg tablet, Take 1  "tablet (25 mg total) by mouth 2 (two) times daily as needed for Dizziness., Disp: 60 tablet, Rfl: 1    TIFFANY-BE 0.35 mg tablet, Take 1 tablet by mouth., Disp: , Rfl:     topiramate (TOPAMAX) 50 MG tablet, Take 50 mg by mouth 2 (two) times daily., Disp: , Rfl:     amLODIPine (NORVASC) 5 MG tablet, Take 5 mg by mouth., Disp: , Rfl:     azithromycin (Z-SUSAN) 250 MG tablet, Take 1 tablet (250 mg total) by mouth once daily. Take first 2 tablets together, then 1 every day until finished. (Patient not taking: Reported on 8/3/2023), Disp: 6 tablet, Rfl: 0    propranoloL (INDERAL) 60 MG tablet, Take 1 tablet (60 mg total) by mouth 3 (three) times daily. (Patient not taking: Reported on 1/9/2023), Disp: 90 tablet, Rfl: 11           Review of Systems   Constitutional:  Negative for appetite change, fatigue and fever.   Respiratory:  Negative for shortness of breath.    Cardiovascular:  Negative for chest pain.   Gastrointestinal:  Positive for abdominal pain. Negative for constipation.   Endocrine: Negative for polydipsia, polyphagia and polyuria.   Genitourinary:  Negative for difficulty urinating, frequency and hot flashes.   Allergic/Immunologic: Negative for environmental allergies.   Neurological:  Negative for dizziness and light-headedness.   Psychiatric/Behavioral:  Negative for agitation.                 Vitals:    08/03/23 1056   BP: 130/89   BP Location: Right arm   Patient Position: Sitting   BP Method: Large (Automatic)   Pulse: 92   Resp: 17   Temp: 97.9 °F (36.6 °C)   TempSrc: Temporal   SpO2: 98%   Weight: 79.8 kg (176 lb)   Height: 5' 6" (1.676 m)        Physical Exam  Vitals and nursing note reviewed.   Constitutional:       Appearance: Normal appearance.   Cardiovascular:      Rate and Rhythm: Normal rate and regular rhythm.      Pulses: Normal pulses.      Heart sounds: Normal heart sounds.   Pulmonary:      Effort: Pulmonary effort is normal.      Breath sounds: Normal breath sounds.   Abdominal:      " General: Abdomen is flat. Bowel sounds are normal.      Palpations: Abdomen is soft.   Musculoskeletal:         General: Normal range of motion.   Skin:     General: Skin is warm and dry.   Neurological:      General: No focal deficit present.      Mental Status: She is alert and oriented to person, place, and time. Mental status is at baseline.           Last Labs:     Office Visit on 08/03/2023   Component Date Value    Sodium 08/03/2023 141     Potassium 08/03/2023 3.6     Chloride 08/03/2023 109 (H)     CO2 08/03/2023 28     Anion Gap 08/03/2023 8     Glucose 08/03/2023 92     BUN 08/03/2023 18     Creatinine 08/03/2023 0.82     BUN/Creatinine Ratio 08/03/2023 22 (H)     Calcium 08/03/2023 9.1     eGFR 08/03/2023 98     Hemoglobin A1C 08/03/2023 5.1     Estimated Average Glucose 08/03/2023 84     Triglycerides 08/03/2023 49     Cholesterol 08/03/2023 198     HDL Cholesterol 08/03/2023 68 (H)     Cholesterol/HDL Ratio (R* 08/03/2023 2.9     Non-HDL 08/03/2023 130     LDL Calculated 08/03/2023 120     LDL/HDL 08/03/2023 1.8     VLDL 08/03/2023 10     Color, UA 08/03/2023 Light-Yellow     Clarity, UA 08/03/2023 Clear     pH, UA 08/03/2023 6.5     Leukocytes, UA 08/03/2023 Negative     Nitrites, UA 08/03/2023 Negative     Protein, UA 08/03/2023 10 (A)     Glucose, UA 08/03/2023 Normal     Ketones, UA 08/03/2023 Negative     Urobilinogen, UA 08/03/2023 Normal     Bilirubin, UA 08/03/2023 Negative     Blood, UA 08/03/2023 Negative     Specific Gravity, UA 08/03/2023 1.028     Hepatitis C Ab 08/03/2023 Non-Reactive     WBC 08/03/2023 7.60     RBC 08/03/2023 3.89 (L)     Hemoglobin 08/03/2023 10.3 (L)     Hematocrit 08/03/2023 33.2 (L)     MCV 08/03/2023 85.3     MCH 08/03/2023 26.5 (L)     MCHC 08/03/2023 31.0 (L)     RDW 08/03/2023 16.5 (H)     Platelet Count 08/03/2023 357     MPV 08/03/2023 10.4     Neutrophils % 08/03/2023 55.5     Lymphocytes % 08/03/2023 37.4     Monocytes % 08/03/2023 6.2 (H)     Eosinophils %  08/03/2023 0.3 (L)     Basophils % 08/03/2023 0.5     Immature Granulocytes % 08/03/2023 0.1     nRBC, Auto 08/03/2023 0.0     Neutrophils, Abs 08/03/2023 4.22     Lymphocytes, Absolute 08/03/2023 2.84     Monocytes, Absolute 08/03/2023 0.47     Eosinophils, Absolute 08/03/2023 0.02     Basophils, Absolute 08/03/2023 0.04     Immature Granulocytes, A* 08/03/2023 0.01     nRBC, Absolute 08/03/2023 0.00     Diff Type 08/03/2023 Auto        Last Imaging:  X-Ray KUB  Narrative: EXAMINATION:  XR KUB    CLINICAL HISTORY:  Abdominal pain    COMPARISON:  5 December 2022    TECHNIQUE:  XR KUB    FINDINGS:  No free fluid or free air seen.  The bowel gas pattern appears within normal limits.  No abnormal calcifications are present.  No other abnormality is identified.  Impression: No evidence of abnormality demonstrated    Electronically signed by: Harley Clemons  Date:    08/03/2023  Time:    12:00         **Labs and x-rays personally reviewed by me    ** reviewed      Objective:        Assessment:       1. Abdominal pain, unspecified abdominal location  X-Ray KUB    Basic Metabolic Panel    CBC Auto Differential    Urinalysis, Reflex to Urine Culture    Basic Metabolic Panel    CBC Auto Differential    Urinalysis, Reflex to Urine Culture    CT Abdomen Pelvis  Without Contrast      2. Screening for diabetes mellitus (DM)  Hemoglobin A1C    Hemoglobin A1C      3. Screening for lipid disorders  Lipid Panel    Lipid Panel      4. Need for hepatitis C screening test  Hepatitis C Antibody    Hepatitis C Antibody      5. Syncope, unspecified syncope type  Ambulatory referral/consult to Neurology    CT Head Without Contrast           Plan:         [unfilled]

## 2023-08-11 ENCOUNTER — HOSPITAL ENCOUNTER (EMERGENCY)
Facility: HOSPITAL | Age: 33
Discharge: HOME | End: 2023-08-11
Payer: MEDICAID

## 2023-08-11 VITALS
BODY MASS INDEX: 28.45 KG/M2 | DIASTOLIC BLOOD PRESSURE: 85 MMHG | WEIGHT: 177 LBS | RESPIRATION RATE: 16 BRPM | SYSTOLIC BLOOD PRESSURE: 132 MMHG | HEART RATE: 85 BPM | TEMPERATURE: 98 F | HEIGHT: 66 IN | OXYGEN SATURATION: 100 %

## 2023-08-11 DIAGNOSIS — M54.2 NECK PAIN: Primary | ICD-10-CM

## 2023-08-11 PROCEDURE — 99284 EMERGENCY DEPT VISIT MOD MDM: CPT

## 2023-08-11 PROCEDURE — 96372 THER/PROPH/DIAG INJ SC/IM: CPT | Performed by: NURSE PRACTITIONER

## 2023-08-11 PROCEDURE — 99283 PR EMERGENCY DEPT VISIT,LEVEL III: ICD-10-PCS | Mod: ,,, | Performed by: NURSE PRACTITIONER

## 2023-08-11 PROCEDURE — 63600175 PHARM REV CODE 636 W HCPCS: Performed by: NURSE PRACTITIONER

## 2023-08-11 PROCEDURE — 99283 EMERGENCY DEPT VISIT LOW MDM: CPT | Mod: ,,, | Performed by: NURSE PRACTITIONER

## 2023-08-11 RX ORDER — KETOROLAC TROMETHAMINE 30 MG/ML
60 INJECTION, SOLUTION INTRAMUSCULAR; INTRAVENOUS
Status: COMPLETED | OUTPATIENT
Start: 2023-08-11 | End: 2023-08-11

## 2023-08-11 RX ORDER — DOCUSATE SODIUM 100 MG/1
100 CAPSULE, LIQUID FILLED ORAL 2 TIMES DAILY PRN
Qty: 60 CAPSULE | Refills: 0 | Status: SHIPPED | OUTPATIENT
Start: 2023-08-11 | End: 2023-09-01

## 2023-08-11 RX ADMIN — KETOROLAC TROMETHAMINE 60 MG: 30 INJECTION, SOLUTION INTRAMUSCULAR at 11:08

## 2023-08-11 NOTE — ED PROVIDER NOTES
Encounter Date: 2023       History     Chief Complaint   Patient presents with    Neck Pain    Arm Pain     32-year-old female presents to the emergency department to be evaluated for pain in her neck that radiates down her right arm.  This began a few months ago.  She is been evaluated by Dr. Kingsley for her neck pain and is scheduled for an MRI.  She said that she feels like the Robaxin is making her constipated.  Denies any recent fall or injury.    The history is provided by the patient.   Neck Pain   This is a new problem. Pertinent negatives include no photophobia, no visual change, no chest pain, no syncope, no numbness, no weight loss, no headaches, no bowel incontinence, no bladder incontinence, no leg pain, no paresis, no tingling and no weakness.   Arm Pain  Pertinent negatives include no chest pain and no headaches.     Review of patient's allergies indicates:  No Known Allergies  Past Medical History:   Diagnosis Date    History of gonorrhea     Hypertension     Migraine without aura, intractable, without status migrainosus     Noncompliance with medication regimen     Syncope      Past Surgical History:   Procedure Laterality Date    Abdominal Wall Mass Surgery  2019     SECTION  08/10/2008     SECTION  01/15/2013    DILATION AND CURETTAGE OF UTERUS  2012     Family History   Problem Relation Age of Onset    Hypertension Mother     Hypertension Father     No Known Problems Brother     No Known Problems Sister     No Known Problems Sister     No Known Problems Sister      Social History     Tobacco Use    Smoking status: Every Day     Current packs/day: 1.00     Average packs/day: 1 pack/day for 17.6 years (17.6 ttl pk-yrs)     Types: Cigarettes     Start date:     Smokeless tobacco: Never   Substance Use Topics    Alcohol use: Never    Drug use: Never     Review of Systems   Constitutional:  Negative for weight loss.   Eyes:  Negative for photophobia.    Cardiovascular:  Negative for chest pain and syncope.   Gastrointestinal:  Negative for bowel incontinence.   Genitourinary:  Negative for bladder incontinence.   Musculoskeletal:  Positive for neck pain.   Neurological:  Negative for tingling, weakness, numbness and headaches.   All other systems reviewed and are negative.      Physical Exam     Initial Vitals [23 1110]   BP Pulse Resp Temp SpO2   132/85 85 16 98.1 °F (36.7 °C) 100 %      MAP       --         Physical Exam    Vitals reviewed.  Constitutional: She appears well-developed and well-nourished.   Neck: Neck supple.   Cardiovascular:  Normal rate and regular rhythm.           Pulmonary/Chest: Breath sounds normal.   Abdominal: Abdomen is soft. Bowel sounds are normal. She exhibits no distension and no mass. There is no abdominal tenderness. There is no rebound and no guarding.   Musculoskeletal:         General: Normal range of motion.      Cervical back: Neck supple. Tenderness present.      Thoracic back: Normal.      Lumbar back: Normal.     Neurological: She is alert and oriented to person, place, and time. She has normal strength. GCS score is 15. GCS eye subscore is 4. GCS verbal subscore is 5. GCS motor subscore is 6.   Skin: Skin is warm and dry. Capillary refill takes less than 2 seconds.   Psychiatric: She has a normal mood and affect.         Medical Screening Exam   See Full Note    ED Course   Procedures  Labs Reviewed - No data to display       Imaging Results    None          Medications   ketorolac injection 60 mg (60 mg Intramuscular Given 23 1129)     Medical Decision Makin-year-old female presents to the emergency department to be evaluated for pain in her neck that radiates down her right arm.  This began a few months ago.  She is been evaluated by Dr. Kingsley for her neck pain and is scheduled for an MRI.  She said that she feels like the Robaxin is making her constipated.  Denies any recent fall or injury.  Patient  received Toradol in the emergency department  Diagnosis: Constipation, neck pain  Patient instructed to continue medications as prescribed and to follow-up with Dr. Kingsley and to have the MRI as scheduled                         Clinical Impression:   Final diagnoses:  [M54.2] Neck pain (Primary)        ED Disposition Condition    Discharge Stable          ED Prescriptions       Medication Sig Dispense Start Date End Date Auth. Provider    docusate sodium (COLACE) 100 MG capsule Take 1 capsule (100 mg total) by mouth 2 (two) times daily as needed for Constipation. 60 capsule 8/11/2023 -- Jodee Baca FNP          Follow-up Information    None          Jodee Baca FNP  08/11/23 1137

## 2023-08-11 NOTE — ED TRIAGE NOTES
Patient here for chronic neck and right sided arm pain. Patient has been seen by many providers for same complaint. Patient AAOx4, vitally stable, and in NAD.

## 2023-08-11 NOTE — DISCHARGE INSTRUCTIONS
Eat fresh fruits and vegetables.  Drink plenty of water.  Follow-up with Dr. Kingsley and for your MRI as scheduled.  Follow up with your primary care provider in 2 days. Return to the emergency department for any increase in symptoms or for any other new or worrisome symptoms.     
no

## 2023-08-14 ENCOUNTER — HOSPITAL ENCOUNTER (EMERGENCY)
Facility: HOSPITAL | Age: 33
Discharge: HOME | End: 2023-08-14
Payer: MEDICAID

## 2023-08-14 VITALS
TEMPERATURE: 99 F | HEART RATE: 81 BPM | HEIGHT: 66 IN | BODY MASS INDEX: 28.12 KG/M2 | RESPIRATION RATE: 16 BRPM | OXYGEN SATURATION: 100 % | DIASTOLIC BLOOD PRESSURE: 83 MMHG | WEIGHT: 175 LBS | SYSTOLIC BLOOD PRESSURE: 126 MMHG

## 2023-08-14 DIAGNOSIS — J06.9 VIRAL URI WITH COUGH: Primary | ICD-10-CM

## 2023-08-14 PROCEDURE — 99282 EMERGENCY DEPT VISIT SF MDM: CPT

## 2023-08-14 PROCEDURE — 99283 PR EMERGENCY DEPT VISIT,LEVEL III: ICD-10-PCS | Mod: ,,, | Performed by: NURSE PRACTITIONER

## 2023-08-14 PROCEDURE — 99283 EMERGENCY DEPT VISIT LOW MDM: CPT | Mod: ,,, | Performed by: NURSE PRACTITIONER

## 2023-08-14 PROCEDURE — 87428 SARSCOV & INF VIR A&B AG IA: CPT | Performed by: NURSE PRACTITIONER

## 2023-08-18 ENCOUNTER — HOSPITAL ENCOUNTER (OUTPATIENT)
Dept: RADIOLOGY | Facility: HOSPITAL | Age: 33
Discharge: HOME | End: 2023-08-18
Attending: ORTHOPAEDIC SURGERY
Payer: MEDICAID

## 2023-08-18 ENCOUNTER — HOSPITAL ENCOUNTER (OUTPATIENT)
Dept: RADIOLOGY | Facility: HOSPITAL | Age: 33
Discharge: HOME | End: 2023-08-18
Attending: INTERNAL MEDICINE
Payer: MEDICAID

## 2023-08-18 DIAGNOSIS — R10.9 ABDOMINAL PAIN, UNSPECIFIED ABDOMINAL LOCATION: ICD-10-CM

## 2023-08-18 DIAGNOSIS — R55 SYNCOPE, UNSPECIFIED SYNCOPE TYPE: ICD-10-CM

## 2023-08-18 DIAGNOSIS — M54.12 CERVICAL RADICULOPATHY: ICD-10-CM

## 2023-08-18 PROCEDURE — 72141 MRI NECK SPINE W/O DYE: CPT | Mod: TC

## 2023-08-18 PROCEDURE — 74176 CT ABDOMEN PELVIS WITHOUT CONTRAST: ICD-10-PCS | Mod: 26,,, | Performed by: RADIOLOGY

## 2023-08-18 PROCEDURE — 74176 CT ABD & PELVIS W/O CONTRAST: CPT | Mod: 26,,, | Performed by: RADIOLOGY

## 2023-08-18 PROCEDURE — 70450 CT HEAD/BRAIN W/O DYE: CPT | Mod: 26,,, | Performed by: RADIOLOGY

## 2023-08-18 PROCEDURE — 74176 CT ABD & PELVIS W/O CONTRAST: CPT | Mod: TC

## 2023-08-18 PROCEDURE — 70450 CT HEAD/BRAIN W/O DYE: CPT | Mod: TC

## 2023-08-18 PROCEDURE — 72141 MRI NECK SPINE W/O DYE: CPT | Mod: 26,,, | Performed by: RADIOLOGY

## 2023-08-18 PROCEDURE — 70450 CT HEAD WITHOUT CONTRAST: ICD-10-PCS | Mod: 26,,, | Performed by: RADIOLOGY

## 2023-08-18 PROCEDURE — 72141 MRI CERVICAL SPINE WITHOUT CONTRAST: ICD-10-PCS | Mod: 26,,, | Performed by: RADIOLOGY

## 2023-08-21 ENCOUNTER — TELEPHONE (OUTPATIENT)
Dept: FAMILY MEDICINE | Facility: CLINIC | Age: 33
End: 2023-08-21
Payer: MEDICAID

## 2023-08-21 NOTE — TELEPHONE ENCOUNTER
----- Message from Brett Dias MD sent at 8/21/2023  2:57 PM CDT -----  Need to see in  1 week please  abnl results     1504 Pt is scheduled to come in on 08/24/23

## 2023-08-24 ENCOUNTER — APPOINTMENT (OUTPATIENT)
Dept: RADIOLOGY | Facility: CLINIC | Age: 33
End: 2023-08-24
Attending: INTERNAL MEDICINE
Payer: MEDICAID

## 2023-08-24 ENCOUNTER — OFFICE VISIT (OUTPATIENT)
Dept: FAMILY MEDICINE | Facility: CLINIC | Age: 33
End: 2023-08-24
Payer: MEDICAID

## 2023-08-24 VITALS
SYSTOLIC BLOOD PRESSURE: 149 MMHG | HEART RATE: 85 BPM | DIASTOLIC BLOOD PRESSURE: 105 MMHG | BODY MASS INDEX: 29.06 KG/M2 | OXYGEN SATURATION: 100 % | RESPIRATION RATE: 18 BRPM | HEIGHT: 66 IN | TEMPERATURE: 97 F | WEIGHT: 180.81 LBS

## 2023-08-24 DIAGNOSIS — M25.511 RIGHT SHOULDER PAIN, UNSPECIFIED CHRONICITY: ICD-10-CM

## 2023-08-24 DIAGNOSIS — R51.9 NONINTRACTABLE HEADACHE, UNSPECIFIED CHRONICITY PATTERN, UNSPECIFIED HEADACHE TYPE: Primary | ICD-10-CM

## 2023-08-24 PROCEDURE — 3080F DIAST BP >= 90 MM HG: CPT | Mod: CPTII,,, | Performed by: INTERNAL MEDICINE

## 2023-08-24 PROCEDURE — 3044F PR MOST RECENT HEMOGLOBIN A1C LEVEL <7.0%: ICD-10-PCS | Mod: CPTII,,, | Performed by: INTERNAL MEDICINE

## 2023-08-24 PROCEDURE — 3044F HG A1C LEVEL LT 7.0%: CPT | Mod: CPTII,,, | Performed by: INTERNAL MEDICINE

## 2023-08-24 PROCEDURE — 3077F SYST BP >= 140 MM HG: CPT | Mod: CPTII,,, | Performed by: INTERNAL MEDICINE

## 2023-08-24 PROCEDURE — 73030 X-RAY EXAM OF SHOULDER: CPT | Mod: TC,RHCUB,RT | Performed by: INTERNAL MEDICINE

## 2023-08-24 PROCEDURE — 3077F PR MOST RECENT SYSTOLIC BLOOD PRESSURE >= 140 MM HG: ICD-10-PCS | Mod: CPTII,,, | Performed by: INTERNAL MEDICINE

## 2023-08-24 PROCEDURE — 99214 OFFICE O/P EST MOD 30 MIN: CPT | Mod: ,,, | Performed by: INTERNAL MEDICINE

## 2023-08-24 PROCEDURE — 99214 PR OFFICE/OUTPT VISIT, EST, LEVL IV, 30-39 MIN: ICD-10-PCS | Mod: ,,, | Performed by: INTERNAL MEDICINE

## 2023-08-24 PROCEDURE — 3008F PR BODY MASS INDEX (BMI) DOCUMENTED: ICD-10-PCS | Mod: CPTII,,, | Performed by: INTERNAL MEDICINE

## 2023-08-24 PROCEDURE — 3008F BODY MASS INDEX DOCD: CPT | Mod: CPTII,,, | Performed by: INTERNAL MEDICINE

## 2023-08-24 PROCEDURE — 1160F RVW MEDS BY RX/DR IN RCRD: CPT | Mod: CPTII,,, | Performed by: INTERNAL MEDICINE

## 2023-08-24 PROCEDURE — 1159F PR MEDICATION LIST DOCUMENTED IN MEDICAL RECORD: ICD-10-PCS | Mod: CPTII,,, | Performed by: INTERNAL MEDICINE

## 2023-08-24 PROCEDURE — 1159F MED LIST DOCD IN RCRD: CPT | Mod: CPTII,,, | Performed by: INTERNAL MEDICINE

## 2023-08-24 PROCEDURE — 1160F PR REVIEW ALL MEDS BY PRESCRIBER/CLIN PHARMACIST DOCUMENTED: ICD-10-PCS | Mod: CPTII,,, | Performed by: INTERNAL MEDICINE

## 2023-08-24 PROCEDURE — 3080F PR MOST RECENT DIASTOLIC BLOOD PRESSURE >= 90 MM HG: ICD-10-PCS | Mod: CPTII,,, | Performed by: INTERNAL MEDICINE

## 2023-08-24 RX ORDER — METHYLPREDNISOLONE 4 MG/1
TABLET ORAL
COMMUNITY
Start: 2023-08-03 | End: 2023-09-01

## 2023-08-24 RX ORDER — DICYCLOMINE HYDROCHLORIDE 20 MG/1
20 TABLET ORAL 4 TIMES DAILY PRN
COMMUNITY
Start: 2023-08-03 | End: 2023-10-10

## 2023-08-24 RX ORDER — ONDANSETRON 8 MG/1
8 TABLET, ORALLY DISINTEGRATING ORAL EVERY 4 HOURS PRN
COMMUNITY
Start: 2023-08-03 | End: 2023-09-01

## 2023-08-24 RX ORDER — BUTALBITAL, ACETAMINOPHEN AND CAFFEINE 50; 325; 40 MG/1; MG/1; MG/1
1 TABLET ORAL EVERY 6 HOURS PRN
COMMUNITY
Start: 2023-08-03 | End: 2023-09-01

## 2023-08-28 PROBLEM — M25.511 RIGHT SHOULDER PAIN: Status: ACTIVE | Noted: 2023-08-28

## 2023-08-28 PROBLEM — R51.9 NONINTRACTABLE HEADACHE: Status: ACTIVE | Noted: 2021-09-02

## 2023-08-28 RX ORDER — POLYETHYLENE GLYCOL 3350 17 G/17G
17 POWDER, FOR SOLUTION ORAL DAILY
Qty: 30 EACH | Refills: 1 | Status: SHIPPED | OUTPATIENT
Start: 2023-08-28

## 2023-08-28 RX ORDER — AMLODIPINE BESYLATE 10 MG/1
10 TABLET ORAL DAILY
Qty: 90 TABLET | Refills: 1 | Status: SHIPPED | OUTPATIENT
Start: 2023-08-28

## 2023-08-28 RX ORDER — TOPIRAMATE 50 MG/1
50 TABLET, FILM COATED ORAL 2 TIMES DAILY
Qty: 60 TABLET | Refills: 2 | Status: SHIPPED | OUTPATIENT
Start: 2023-08-28 | End: 2023-12-04

## 2023-08-28 RX ORDER — NAPROXEN 500 MG/1
500 TABLET ORAL 2 TIMES DAILY PRN
Qty: 30 TABLET | Refills: 0 | Status: SHIPPED | OUTPATIENT
Start: 2023-08-28 | End: 2023-10-10

## 2023-08-29 NOTE — PROGRESS NOTES
Subjective:       Patient ID: Elsa Duran is a 32 y.o. female.    Chief Complaint: Abdominal Pain    Abdominal Pain  Pertinent negatives include no constipation, fever or frequency.   Patient complains of chronic constipation patient complains of chronic headache and also patient has chronic hypertension blood pressure today is 140/105.  Patient 5 mg Norvasc today.  For the headache consult Neurology start Topamax 50 mg 1 p.o. b.i.d..  Patient complains of moderate to severe pain decreased range of motion of the right shoulder will x-ray the right shoulder start Naprosyn 500 mg 1 p.o. b.i.d..  For the hypertension the plan increase Norvasc 10 mg 1 p.o. q.day.    For the constipation will start.  MiraLax.  .    Current Medications:    Current Outpatient Medications:     amLODIPine (NORVASC) 10 MG tablet, Take 1 tablet (10 mg total) by mouth once daily., Disp: 90 tablet, Rfl: 1    amLODIPine (NORVASC) 5 MG tablet, Take 1 tablet (5 mg total) by mouth once daily., Disp: 30 tablet, Rfl: 11    butalbital-acetaminophen-caffeine -40 mg (FIORICET, ESGIC) -40 mg per tablet, Take 1 tablet by mouth every 6 (six) hours as needed., Disp: , Rfl:     dicyclomine (BENTYL) 20 mg tablet, Take 20 mg by mouth 4 (four) times daily as needed., Disp: , Rfl:     docusate sodium (COLACE) 100 MG capsule, Take 1 capsule (100 mg total) by mouth 2 (two) times daily as needed for Constipation., Disp: 60 capsule, Rfl: 0    fluticasone propionate (FLONASE) 50 mcg/actuation nasal spray, 1 spray (50 mcg total) by Each Nostril route 2 (two) times daily as needed., Disp: 15 g, Rfl: 0    HYDROcodone-acetaminophen (NORCO) 5-325 mg per tablet, Take 1 tablet by mouth every 6 (six) hours as needed for Pain., Disp: 12 tablet, Rfl: 0    ibuprofen (ADVIL,MOTRIN) 800 MG tablet, Take 1 tablet (800 mg total) by mouth every 6 (six) hours as needed for Pain., Disp: 20 tablet, Rfl: 0    labetaloL (NORMODYNE) 200 MG tablet, Take by mouth., Disp: ,  Rfl:     loratadine (CLARITIN) 10 mg tablet, Take 1 tablet (10 mg total) by mouth once daily., Disp: 30 tablet, Rfl: 11    meclizine (ANTIVERT) 25 mg tablet, Take 1 tablet (25 mg total) by mouth 2 (two) times daily as needed for Dizziness., Disp: 60 tablet, Rfl: 1    methylPREDNISolone (MEDROL DOSEPACK) 4 mg tablet, Take by mouth., Disp: , Rfl:     TIFFANY-BE 0.35 mg tablet, Take 1 tablet by mouth., Disp: , Rfl:     ondansetron (ZOFRAN-ODT) 8 MG TbDL, Take 8 mg by mouth every 4 (four) hours as needed., Disp: , Rfl:     topiramate (TOPAMAX) 50 MG tablet, Take 1 tablet (50 mg total) by mouth 2 (two) times daily., Disp: 60 tablet, Rfl: 2    azithromycin (Z-SUSAN) 250 MG tablet, Take 1 tablet (250 mg total) by mouth once daily. Take first 2 tablets together, then 1 every day until finished. (Patient not taking: Reported on 8/3/2023), Disp: 6 tablet, Rfl: 0    naproxen (NAPROSYN) 500 MG tablet, Take 1 tablet (500 mg total) by mouth 2 (two) times daily as needed (pain)., Disp: 30 tablet, Rfl: 0    polyethylene glycol (GLYCOLAX) 17 gram PwPk, Take 17 g by mouth once daily., Disp: 30 each, Rfl: 1    propranoloL (INDERAL) 60 MG tablet, Take 1 tablet (60 mg total) by mouth 3 (three) times daily. (Patient not taking: Reported on 1/9/2023), Disp: 90 tablet, Rfl: 11           Review of Systems   Constitutional:  Negative for appetite change, fatigue and fever.   Respiratory:  Negative for shortness of breath.    Cardiovascular:  Negative for chest pain.   Gastrointestinal:  Positive for abdominal pain. Negative for constipation.   Endocrine: Negative for polydipsia, polyphagia and polyuria.   Genitourinary:  Negative for difficulty urinating, frequency and hot flashes.   Allergic/Immunologic: Negative for environmental allergies.   Neurological:  Negative for dizziness and light-headedness.   Psychiatric/Behavioral:  Negative for agitation.                 Vitals:    08/24/23 1034 08/24/23 1038   BP: (!) 145/90 (!) 149/105   BP  "Location: Right arm    Patient Position: Sitting    BP Method: Large (Automatic)    Pulse: 85    Resp: 18    Temp: 97.4 °F (36.3 °C)    TempSrc: Temporal    SpO2: 100%    Weight: 82 kg (180 lb 12.8 oz)    Height: 5' 6" (1.676 m)         Physical Exam  Vitals and nursing note reviewed.   Constitutional:       Appearance: Normal appearance.   Cardiovascular:      Rate and Rhythm: Normal rate and regular rhythm.      Pulses: Normal pulses.      Heart sounds: Normal heart sounds.   Pulmonary:      Effort: Pulmonary effort is normal.      Breath sounds: Normal breath sounds.   Abdominal:      General: Abdomen is flat. Bowel sounds are normal.      Palpations: Abdomen is soft.   Musculoskeletal:         General: Normal range of motion.   Skin:     General: Skin is warm and dry.   Neurological:      General: No focal deficit present.      Mental Status: She is alert and oriented to person, place, and time. Mental status is at baseline.           Last Labs:     Admission on 08/14/2023, Discharged on 08/14/2023   Component Date Value    Influenza A 08/14/2023 Negative     Influenza B 08/14/2023 Negative     COVID-19 Ag 08/14/2023 Negative    Office Visit on 08/03/2023   Component Date Value    Sodium 08/03/2023 141     Potassium 08/03/2023 3.6     Chloride 08/03/2023 109 (H)     CO2 08/03/2023 28     Anion Gap 08/03/2023 8     Glucose 08/03/2023 92     BUN 08/03/2023 18     Creatinine 08/03/2023 0.82     BUN/Creatinine Ratio 08/03/2023 22 (H)     Calcium 08/03/2023 9.1     eGFR 08/03/2023 98     Hemoglobin A1C 08/03/2023 5.1     Estimated Average Glucose 08/03/2023 84     Triglycerides 08/03/2023 49     Cholesterol 08/03/2023 198     HDL Cholesterol 08/03/2023 68 (H)     Cholesterol/HDL Ratio (R* 08/03/2023 2.9     Non-HDL 08/03/2023 130     LDL Calculated 08/03/2023 120     LDL/HDL 08/03/2023 1.8     VLDL 08/03/2023 10     Color, UA 08/03/2023 Light-Yellow     Clarity, UA 08/03/2023 Clear     pH, UA 08/03/2023 6.5     " Leukocytes, UA 08/03/2023 Negative     Nitrites, UA 08/03/2023 Negative     Protein, UA 08/03/2023 10 (A)     Glucose, UA 08/03/2023 Normal     Ketones, UA 08/03/2023 Negative     Urobilinogen, UA 08/03/2023 Normal     Bilirubin, UA 08/03/2023 Negative     Blood, UA 08/03/2023 Negative     Specific Gravity, UA 08/03/2023 1.028     Hepatitis C Ab 08/03/2023 Non-Reactive     WBC 08/03/2023 7.60     RBC 08/03/2023 3.89 (L)     Hemoglobin 08/03/2023 10.3 (L)     Hematocrit 08/03/2023 33.2 (L)     MCV 08/03/2023 85.3     MCH 08/03/2023 26.5 (L)     MCHC 08/03/2023 31.0 (L)     RDW 08/03/2023 16.5 (H)     Platelet Count 08/03/2023 357     MPV 08/03/2023 10.4     Neutrophils % 08/03/2023 55.5     Lymphocytes % 08/03/2023 37.4     Monocytes % 08/03/2023 6.2 (H)     Eosinophils % 08/03/2023 0.3 (L)     Basophils % 08/03/2023 0.5     Immature Granulocytes % 08/03/2023 0.1     nRBC, Auto 08/03/2023 0.0     Neutrophils, Abs 08/03/2023 4.22     Lymphocytes, Absolute 08/03/2023 2.84     Monocytes, Absolute 08/03/2023 0.47     Eosinophils, Absolute 08/03/2023 0.02     Basophils, Absolute 08/03/2023 0.04     Immature Granulocytes, A* 08/03/2023 0.01     nRBC, Absolute 08/03/2023 0.00     Diff Type 08/03/2023 Auto        Last Imaging:  X-ray Shoulder 2 or More Views Right  Narrative: EXAMINATION:  XR SHOULDER COMPLETE 2 OR MORE VIEWS RIGHT    CLINICAL HISTORY:  Pain in right shoulder    COMPARISON:  Right shoulder x-ray July 27, 2023    TECHNIQUE:  Frontal views of the right shoulder were obtained in internal and external rotation .    FINDINGS:  No convincing acute fracture or dislocation demonstrated. No concerning radiopaque foreign body visualized.  Impression: As above.    Point of Service: [facility]    Electronically signed by: Robert Parham  Date:    08/24/2023  Time:    11:16         **Labs and x-rays personally reviewed by me    ** reviewed      Objective:        Assessment:       1. Nonintractable  headache, unspecified chronicity pattern, unspecified headache type  Ambulatory referral/consult to Neurology      2. Right shoulder pain, unspecified chronicity  X-ray Shoulder 2 or More Views Right           Plan:         [unfilled]

## 2023-08-31 ENCOUNTER — TELEPHONE (OUTPATIENT)
Dept: ORTHOPEDICS | Facility: CLINIC | Age: 33
End: 2023-08-31
Payer: MEDICAID

## 2023-08-31 NOTE — PROGRESS NOTES
Subjective:       Patient ID: Elsa Duran is a 32 y.o. female     Chief Complaint:    Chief Complaint   Patient presents with    Establish Care    Headache        Allergies:  Patient has no known allergies.    Current Medications:    Outpatient Encounter Medications as of 9/1/2023   Medication Sig Dispense Refill    amLODIPine (NORVASC) 10 MG tablet Take 1 tablet (10 mg total) by mouth once daily. 90 tablet 1    ibuprofen (ADVIL,MOTRIN) 800 MG tablet Take 1 tablet (800 mg total) by mouth every 6 (six) hours as needed for Pain. 20 tablet 0    naproxen (NAPROSYN) 500 MG tablet Take 1 tablet (500 mg total) by mouth 2 (two) times daily as needed (pain). 30 tablet 0    TIFFANY-BE 0.35 mg tablet Take 1 tablet by mouth.      polyethylene glycol (GLYCOLAX) 17 gram PwPk Take 17 g by mouth once daily. 30 each 1    topiramate (TOPAMAX) 50 MG tablet Take 1 tablet (50 mg total) by mouth 2 (two) times daily. 60 tablet 2    dicyclomine (BENTYL) 20 mg tablet Take 20 mg by mouth 4 (four) times daily as needed.      [DISCONTINUED] amLODIPine (NORVASC) 5 MG tablet Take 1 tablet (5 mg total) by mouth once daily. 30 tablet 11    [DISCONTINUED] azithromycin (Z-SUSAN) 250 MG tablet Take 1 tablet (250 mg total) by mouth once daily. Take first 2 tablets together, then 1 every day until finished. (Patient not taking: Reported on 8/3/2023) 6 tablet 0    [DISCONTINUED] butalbital-acetaminophen-caffeine -40 mg (FIORICET, ESGIC) -40 mg per tablet Take 1 tablet by mouth every 6 (six) hours as needed.      [DISCONTINUED] docusate sodium (COLACE) 100 MG capsule Take 1 capsule (100 mg total) by mouth 2 (two) times daily as needed for Constipation. 60 capsule 0    [DISCONTINUED] fluticasone propionate (FLONASE) 50 mcg/actuation nasal spray 1 spray (50 mcg total) by Each Nostril route 2 (two) times daily as needed. 15 g 0    [DISCONTINUED] HYDROcodone-acetaminophen (NORCO) 5-325 mg per tablet Take 1 tablet by mouth every 6 (six) hours  as needed for Pain. (Patient not taking: Reported on 9/1/2023) 12 tablet 0    [DISCONTINUED] labetaloL (NORMODYNE) 200 MG tablet Take by mouth.      [DISCONTINUED] loratadine (CLARITIN) 10 mg tablet Take 1 tablet (10 mg total) by mouth once daily. (Patient not taking: Reported on 9/1/2023) 30 tablet 11    [DISCONTINUED] meclizine (ANTIVERT) 25 mg tablet Take 1 tablet (25 mg total) by mouth 2 (two) times daily as needed for Dizziness. 60 tablet 1    [DISCONTINUED] methylPREDNISolone (MEDROL DOSEPACK) 4 mg tablet Take by mouth.      [DISCONTINUED] ondansetron (ZOFRAN-ODT) 8 MG TbDL Take 8 mg by mouth every 4 (four) hours as needed.      [DISCONTINUED] propranoloL (INDERAL) 60 MG tablet Take 1 tablet (60 mg total) by mouth 3 (three) times daily. (Patient not taking: Reported on 1/9/2023) 90 tablet 11     No facility-administered encounter medications on file as of 9/1/2023.       History of Present Illness  32 yr old A.A. female presents with headaches.  Last seen in clinic 2 years ago    Review of the EMR shows more ED visits than can be imagined in the last year for a multitude of complaints    Prior workup has been hampered by non-compliance with testing and medication treatment as well as non-compliance with followup.  Prior MRIs were not obtained when ordered and ultimately it was March of 2021 before MRI and MRA were obtained and were negative.  EEG negative as well. She never kept her followups with cardiology which we referred to for evaluation of the syncope as well.    Prior prescribed topamax, she reported was not effective, but the pharmacy reported she never started the medication because she never obtained it per their records.  I see her primary care restarted it when he sent this referral.  She was restarted on it just 7 days ago, so still too early to know how well it will work.  I suggest continuing it, at least 6 weeks before we try to make further changes.      Prior was prescribed propranolol for  headaches, but also for poorly controlled HTN.  She reported at her last visit 2 years ago that her headches were doing better on it, about 2 days of th eweek so about half as much.  So I increased her dosing.  Today, she is not on it.    In the past on all visits she openly admitted to daily use of OTC motrin or tylenol for headache.  She denies this today.  She is not a candidate for triptans due to her poorly controlled BP and given her history of overuse headaches fioricet is a poor choice in any case and not recommended anyway.             Review of Systems  Review of Systems   Constitutional:  Negative for diaphoresis and fever.   HENT:  Negative for congestion, hearing loss and tinnitus.    Eyes:  Negative for blurred vision, double vision, photophobia, discharge and redness.   Respiratory:  Negative for cough and shortness of breath.    Cardiovascular:  Negative for chest pain.   Gastrointestinal:  Negative for abdominal pain, nausea and vomiting.   Musculoskeletal:  Negative for back pain, joint pain, myalgias and neck pain.   Skin:  Negative for itching and rash.   Neurological:  Positive for headaches. Negative for dizziness, tremors, sensory change, speech change, focal weakness, seizures, loss of consciousness and weakness.   Psychiatric/Behavioral:  Negative for depression, hallucinations and memory loss. The patient does not have insomnia.    All other systems reviewed and are negative.     Objective:     NEUROLOGICAL EXAMINATION:     MENTAL STATUS   Oriented to person, place, and time.   Registration: recalls 3 of 3 objects. Recall at 5 minutes: recalls 3 of 3 objects.   Attention: normal. Concentration: normal.   Speech: speech is normal   Level of consciousness: alert  Knowledge: good and consistent with education.   Normal comprehension.     CRANIAL NERVES     CN II   Visual fields full to confrontation.   Visual acuity: normal  Right visual field deficit: none  Left visual field deficit: none      CN III, IV, VI   Pupils are equal, round, and reactive to light.  Extraocular motions are normal.   Right pupil: Size: 3 mm. Shape: regular. Reactivity: brisk. Consensual response: intact. Accommodation: intact.   Left pupil: Size: 3 mm. Shape: regular. Reactivity: brisk. Consensual response: intact. Accommodation: intact.   CN III: no CN III palsy  CN VI: no CN VI palsy  Nystagmus: none   Diplopia: none  Upgaze: normal  Downgaze: normal  Conjugate gaze: present  Vestibulo-ocular reflex: present    CN V   Facial sensation intact.   Right facial sensation deficit: none  Left facial sensation deficit: none  Right corneal reflex: normal  Left corneal reflex: normal    CN VII   Facial expression full, symmetric.   Right facial weakness: none  Left facial weakness: none  Right taste: normal  Left taste: normal    CN VIII   CN VIII normal.   Hearing: intact    CN IX, X   CN IX normal.   CN X normal.   Palate: symmetric    CN XI   CN XI normal.   Right sternocleidomastoid strength: normal  Left sternocleidomastoid strength: normal  Right trapezius strength: normal  Left trapezius strength: normal    CN XII   CN XII normal.   Tongue: not atrophic  Fasciculations: absent  Tongue deviation: none    MOTOR EXAM   Muscle bulk: normal  Overall muscle tone: normal  Right arm tone: normal  Left arm tone: normal  Right arm pronator drift: absent  Left arm pronator drift: absent  Right leg tone: normal  Left leg tone: normal    Strength   Right neck flexion: 5/5  Left neck flexion: 5/5  Right neck extension: 5/5  Left neck extension: 5/5  Right deltoid: 5/5  Left deltoid: 5/5  Right biceps: 5/5  Left biceps: 5/5  Right triceps: 5/5  Left triceps: 5/5  Right wrist flexion: 5/5  Left wrist flexion: 5/5  Right wrist extension: 5/5  Left wrist extension: 5/5  Right interossei: 5/5  Left interossei: 5/5  Right iliopsoas: 5/5  Left iliopsoas: 5/5  Right quadriceps: 5/5  Left quadriceps: 5/5  Right hamstrin/5  Left hamstring:  5/5  Right anterior tibial: 5/5  Left anterior tibial: 5/5  Right posterior tibial: 5/5  Left posterior tibial: 5/5  Right gastroc: 5/5  Left gastroc: 5/5    REFLEXES     Reflexes   Right brachioradialis: 2+  Left brachioradialis: 2+  Right biceps: 2+  Left biceps: 2+  Right triceps: 2+  Left triceps: 2+  Right patellar: 2+  Left patellar: 2+  Right achilles: 2+  Left achilles: 2+  Right plantar: normal  Left plantar: normal  Right Rosario: absent  Left Rosario: absent  Right ankle clonus: absent  Left ankle clonus: absent  Right pendular knee jerk: absent  Left pendular knee jerk: absent    SENSORY EXAM   Light touch normal.   Right arm light touch: normal  Left arm light touch: normal  Right leg light touch: normal  Left leg light touch: normal  Vibration normal.   Right arm vibration: normal  Left arm vibration: normal  Right leg vibration: normal  Left leg vibration: normal  Proprioception normal.   Right arm proprioception: normal  Left arm proprioception: normal  Right leg proprioception: normal  Left leg proprioception: normal  Pinprick normal.   Right arm pinprick: normal  Left arm pinprick: normal  Right leg pinprick: normal  Left leg pinprick: normal  Graphesthesia: normal  Romberg: negative  Stereognosis: normal    GAIT AND COORDINATION     Gait  Gait: normal     Coordination   Finger to nose coordination: normal  Heel to shin coordination: normal  Tandem walking coordination: normal    Tremor   Resting tremor: absent  Intention tremor: absent  Action tremor: absent       Physical Exam  Vitals and nursing note reviewed.   Constitutional:       Appearance: Normal appearance.   HENT:      Head: Normocephalic.   Eyes:      Extraocular Movements: Extraocular movements intact and EOM normal.      Pupils: Pupils are equal, round, and reactive to light.   Cardiovascular:      Rate and Rhythm: Normal rate and regular rhythm.   Pulmonary:      Effort: Pulmonary effort is normal.      Breath sounds: Normal breath  sounds.   Musculoskeletal:         General: No swelling or tenderness. Normal range of motion.      Cervical back: Normal range of motion and neck supple.      Right lower leg: No edema.      Left lower leg: No edema.   Skin:     General: Skin is warm and dry.      Coloration: Skin is not jaundiced.      Findings: No rash.   Neurological:      General: No focal deficit present.      Mental Status: She is alert and oriented to person, place, and time.      GCS: GCS eye subscore is 4. GCS verbal subscore is 5. GCS motor subscore is 6.      Cranial Nerves: No cranial nerve deficit.      Sensory: No sensory deficit.      Motor: Motor function is intact. No weakness.      Coordination: Coordination is intact. Coordination normal. Finger-Nose-Finger Test, Heel to Shin Test and Romberg Test normal.      Gait: Gait is intact. Gait and tandem walk normal.      Deep Tendon Reflexes: Reflexes normal.      Reflex Scores:       Tricep reflexes are 2+ on the right side and 2+ on the left side.       Bicep reflexes are 2+ on the right side and 2+ on the left side.       Brachioradialis reflexes are 2+ on the right side and 2+ on the left side.       Patellar reflexes are 2+ on the right side and 2+ on the left side.       Achilles reflexes are 2+ on the right side and 2+ on the left side.  Psychiatric:         Mood and Affect: Mood normal.         Speech: Speech normal.         Behavior: Behavior normal.          Assessment:     Problem List Items Addressed This Visit          Neuro    Chronic migraine without aura without status migrainosus, not intractable - Primary     Other Visit Diagnoses       Vitamin D deficiency        Relevant Orders    Vitamin D    Vitamin B12 deficiency        Relevant Orders    Folate    Vitamin B12    Hypothyroidism, unspecified type        Relevant Orders    TSH               Primary Diagnosis and ICD10  Chronic migraine without aura without status migrainosus, not intractable [G43.709]    Plan:      There are no Patient Instructions on file for this visit.    Medications Discontinued During This Encounter   Medication Reason    amLODIPine (NORVASC) 5 MG tablet     azithromycin (Z-SUSAN) 250 MG tablet     butalbital-acetaminophen-caffeine -40 mg (FIORICET, ESGIC) -40 mg per tablet     docusate sodium (COLACE) 100 MG capsule     fluticasone propionate (FLONASE) 50 mcg/actuation nasal spray     HYDROcodone-acetaminophen (NORCO) 5-325 mg per tablet     labetaloL (NORMODYNE) 200 MG tablet Patient no longer taking    loratadine (CLARITIN) 10 mg tablet Patient no longer taking    meclizine (ANTIVERT) 25 mg tablet     methylPREDNISolone (MEDROL DOSEPACK) 4 mg tablet     ondansetron (ZOFRAN-ODT) 8 MG TbDL     propranoloL (INDERAL) 60 MG tablet          Requested Prescriptions      No prescriptions requested or ordered in this encounter       Orders Placed This Encounter   Procedures    Folate    TSH    Vitamin B12    Vitamin D

## 2023-08-31 NOTE — TELEPHONE ENCOUNTER
----- Message from Sameer Kingsley MD sent at 8/21/2023  9:30 AM CDT -----  C-spine shows no stenosis.  Await the EMG results of her right upper extremity.  We will follow-up after the EMG results.

## 2023-08-31 NOTE — TELEPHONE ENCOUNTER
Called patient with MRI results and told her that Dr. Kingsley wanted to order EMGs on her. Explained to her that I would make the referral to [facility] and to call our office back after she has the EMGs performed.

## 2023-09-01 ENCOUNTER — OFFICE VISIT (OUTPATIENT)
Dept: NEUROLOGY | Facility: CLINIC | Age: 33
End: 2023-09-01
Payer: MEDICAID

## 2023-09-01 VITALS
OXYGEN SATURATION: 99 % | DIASTOLIC BLOOD PRESSURE: 90 MMHG | HEIGHT: 66 IN | HEART RATE: 97 BPM | BODY MASS INDEX: 28.61 KG/M2 | SYSTOLIC BLOOD PRESSURE: 126 MMHG | WEIGHT: 178 LBS

## 2023-09-01 DIAGNOSIS — G43.709 CHRONIC MIGRAINE WITHOUT AURA WITHOUT STATUS MIGRAINOSUS, NOT INTRACTABLE: Primary | ICD-10-CM

## 2023-09-01 DIAGNOSIS — E03.9 HYPOTHYROIDISM, UNSPECIFIED TYPE: ICD-10-CM

## 2023-09-01 DIAGNOSIS — E53.8 VITAMIN B12 DEFICIENCY: ICD-10-CM

## 2023-09-01 DIAGNOSIS — E55.9 VITAMIN D DEFICIENCY: ICD-10-CM

## 2023-09-01 PROCEDURE — 3074F PR MOST RECENT SYSTOLIC BLOOD PRESSURE < 130 MM HG: ICD-10-PCS | Mod: CPTII,,, | Performed by: NURSE PRACTITIONER

## 2023-09-01 PROCEDURE — 99213 OFFICE O/P EST LOW 20 MIN: CPT | Mod: S$PBB,,, | Performed by: NURSE PRACTITIONER

## 2023-09-01 PROCEDURE — 1160F RVW MEDS BY RX/DR IN RCRD: CPT | Mod: CPTII,,, | Performed by: NURSE PRACTITIONER

## 2023-09-01 PROCEDURE — 3008F PR BODY MASS INDEX (BMI) DOCUMENTED: ICD-10-PCS | Mod: CPTII,,, | Performed by: NURSE PRACTITIONER

## 2023-09-01 PROCEDURE — 3044F HG A1C LEVEL LT 7.0%: CPT | Mod: CPTII,,, | Performed by: NURSE PRACTITIONER

## 2023-09-01 PROCEDURE — 3074F SYST BP LT 130 MM HG: CPT | Mod: CPTII,,, | Performed by: NURSE PRACTITIONER

## 2023-09-01 PROCEDURE — 3080F PR MOST RECENT DIASTOLIC BLOOD PRESSURE >= 90 MM HG: ICD-10-PCS | Mod: CPTII,,, | Performed by: NURSE PRACTITIONER

## 2023-09-01 PROCEDURE — 1159F PR MEDICATION LIST DOCUMENTED IN MEDICAL RECORD: ICD-10-PCS | Mod: CPTII,,, | Performed by: NURSE PRACTITIONER

## 2023-09-01 PROCEDURE — 1159F MED LIST DOCD IN RCRD: CPT | Mod: CPTII,,, | Performed by: NURSE PRACTITIONER

## 2023-09-01 PROCEDURE — 3044F PR MOST RECENT HEMOGLOBIN A1C LEVEL <7.0%: ICD-10-PCS | Mod: CPTII,,, | Performed by: NURSE PRACTITIONER

## 2023-09-01 PROCEDURE — 99214 OFFICE O/P EST MOD 30 MIN: CPT | Mod: PBBFAC | Performed by: NURSE PRACTITIONER

## 2023-09-01 PROCEDURE — 1160F PR REVIEW ALL MEDS BY PRESCRIBER/CLIN PHARMACIST DOCUMENTED: ICD-10-PCS | Mod: CPTII,,, | Performed by: NURSE PRACTITIONER

## 2023-09-01 PROCEDURE — 99213 PR OFFICE/OUTPT VISIT, EST, LEVL III, 20-29 MIN: ICD-10-PCS | Mod: S$PBB,,, | Performed by: NURSE PRACTITIONER

## 2023-09-01 PROCEDURE — 3008F BODY MASS INDEX DOCD: CPT | Mod: CPTII,,, | Performed by: NURSE PRACTITIONER

## 2023-09-01 PROCEDURE — 3080F DIAST BP >= 90 MM HG: CPT | Mod: CPTII,,, | Performed by: NURSE PRACTITIONER

## 2023-09-01 NOTE — PATIENT INSTRUCTIONS
1. Continue the topamax 50mg twice daily    2. Continue encourage patient not to use tylenol or motrin as needed.  Only use the prescribed naproxen and phenergan as directed and not more than 3 days of the week    3. Labs as ordered

## 2023-09-05 ENCOUNTER — TELEPHONE (OUTPATIENT)
Dept: NEUROLOGY | Facility: CLINIC | Age: 33
End: 2023-09-05
Payer: MEDICAID

## 2023-09-05 DIAGNOSIS — E55.9 VITAMIN D DEFICIENCY: Primary | ICD-10-CM

## 2023-09-05 RX ORDER — ERGOCALCIFEROL 1.25 MG/1
50000 CAPSULE ORAL
Qty: 4 CAPSULE | Refills: 2 | Status: SHIPPED | OUTPATIENT
Start: 2023-09-05 | End: 2023-10-10

## 2023-09-05 NOTE — TELEPHONE ENCOUNTER
Called pt and gave her the information below from SUE Edmondson NP. She v/u.        ----- Message from NICKOLAS Burch sent at 9/5/2023  5:52 AM CDT -----  Her vitamin D is very low at 4.2, I sent in a supplement for her to take as directed

## 2023-09-26 ENCOUNTER — OFFICE VISIT (OUTPATIENT)
Dept: FAMILY MEDICINE | Facility: CLINIC | Age: 33
End: 2023-09-26
Payer: MEDICAID

## 2023-09-26 VITALS
WEIGHT: 183.81 LBS | OXYGEN SATURATION: 98 % | SYSTOLIC BLOOD PRESSURE: 140 MMHG | RESPIRATION RATE: 17 BRPM | HEART RATE: 93 BPM | BODY MASS INDEX: 29.54 KG/M2 | TEMPERATURE: 97 F | DIASTOLIC BLOOD PRESSURE: 92 MMHG | HEIGHT: 66 IN

## 2023-09-26 DIAGNOSIS — M19.011 OSTEOARTHRITIS OF RIGHT SHOULDER, UNSPECIFIED OSTEOARTHRITIS TYPE: Primary | ICD-10-CM

## 2023-09-26 PROCEDURE — 3008F BODY MASS INDEX DOCD: CPT | Mod: CPTII,,, | Performed by: INTERNAL MEDICINE

## 2023-09-26 PROCEDURE — 1159F PR MEDICATION LIST DOCUMENTED IN MEDICAL RECORD: ICD-10-PCS | Mod: CPTII,,, | Performed by: INTERNAL MEDICINE

## 2023-09-26 PROCEDURE — 1159F MED LIST DOCD IN RCRD: CPT | Mod: CPTII,,, | Performed by: INTERNAL MEDICINE

## 2023-09-26 PROCEDURE — 3080F PR MOST RECENT DIASTOLIC BLOOD PRESSURE >= 90 MM HG: ICD-10-PCS | Mod: CPTII,,, | Performed by: INTERNAL MEDICINE

## 2023-09-26 PROCEDURE — 1160F RVW MEDS BY RX/DR IN RCRD: CPT | Mod: CPTII,,, | Performed by: INTERNAL MEDICINE

## 2023-09-26 PROCEDURE — 3044F HG A1C LEVEL LT 7.0%: CPT | Mod: CPTII,,, | Performed by: INTERNAL MEDICINE

## 2023-09-26 PROCEDURE — 3044F PR MOST RECENT HEMOGLOBIN A1C LEVEL <7.0%: ICD-10-PCS | Mod: CPTII,,, | Performed by: INTERNAL MEDICINE

## 2023-09-26 PROCEDURE — 99213 PR OFFICE/OUTPT VISIT, EST, LEVL III, 20-29 MIN: ICD-10-PCS | Mod: ,,, | Performed by: INTERNAL MEDICINE

## 2023-09-26 PROCEDURE — 3080F DIAST BP >= 90 MM HG: CPT | Mod: CPTII,,, | Performed by: INTERNAL MEDICINE

## 2023-09-26 PROCEDURE — 1160F PR REVIEW ALL MEDS BY PRESCRIBER/CLIN PHARMACIST DOCUMENTED: ICD-10-PCS | Mod: CPTII,,, | Performed by: INTERNAL MEDICINE

## 2023-09-26 PROCEDURE — 3077F SYST BP >= 140 MM HG: CPT | Mod: CPTII,,, | Performed by: INTERNAL MEDICINE

## 2023-09-26 PROCEDURE — 99213 OFFICE O/P EST LOW 20 MIN: CPT | Mod: ,,, | Performed by: INTERNAL MEDICINE

## 2023-09-26 PROCEDURE — 3077F PR MOST RECENT SYSTOLIC BLOOD PRESSURE >= 140 MM HG: ICD-10-PCS | Mod: CPTII,,, | Performed by: INTERNAL MEDICINE

## 2023-09-26 PROCEDURE — 3008F PR BODY MASS INDEX (BMI) DOCUMENTED: ICD-10-PCS | Mod: CPTII,,, | Performed by: INTERNAL MEDICINE

## 2023-10-03 PROBLEM — M19.011 OSTEOARTHRITIS OF RIGHT SHOULDER: Status: ACTIVE | Noted: 2023-10-03

## 2023-10-03 NOTE — PROGRESS NOTES
Subjective:       Patient ID: Elsa Duran is a 33 y.o. female.    Chief Complaint: Headache (Follow ), Shoulder Pain (Right shoulder pain up to neck ), and Neck Pain  Patient complains of moderate pain to the right shoulder.  She rates an 8/10.  She is in no acute distress the patient is taking Naprosyn p.r.n. time she does have point tenderness in the right shoulder at this point I am going to refer the patient to orthopedics.  Headache   Associated symptoms include neck pain. Pertinent negatives include no abdominal pain, dizziness or fever.   Shoulder Pain   Associated symptoms include headaches. Pertinent negatives include no fever.   Neck Pain   Associated symptoms include headaches. Pertinent negatives include no chest pain or fever.     .    Current Medications:    Current Outpatient Medications:     amLODIPine (NORVASC) 10 MG tablet, Take 1 tablet (10 mg total) by mouth once daily., Disp: 90 tablet, Rfl: 1    ergocalciferol (ERGOCALCIFEROL) 50,000 unit Cap, Take 1 capsule (50,000 Units total) by mouth every 7 days., Disp: 4 capsule, Rfl: 2    TIFFANY-BE 0.35 mg tablet, Take 1 tablet by mouth., Disp: , Rfl:     polyethylene glycol (GLYCOLAX) 17 gram PwPk, Take 17 g by mouth once daily., Disp: 30 each, Rfl: 1    topiramate (TOPAMAX) 50 MG tablet, Take 1 tablet (50 mg total) by mouth 2 (two) times daily., Disp: 60 tablet, Rfl: 2    dicyclomine (BENTYL) 20 mg tablet, Take 20 mg by mouth 4 (four) times daily as needed., Disp: , Rfl:     ibuprofen (ADVIL,MOTRIN) 800 MG tablet, Take 1 tablet (800 mg total) by mouth every 6 (six) hours as needed for Pain. (Patient not taking: Reported on 9/26/2023), Disp: 20 tablet, Rfl: 0    naproxen (NAPROSYN) 500 MG tablet, Take 1 tablet (500 mg total) by mouth 2 (two) times daily as needed (pain). (Patient not taking: Reported on 9/26/2023), Disp: 30 tablet, Rfl: 0           Review of Systems   Constitutional:  Negative for appetite change, fatigue and fever.   Respiratory:   "Negative for shortness of breath.    Cardiovascular:  Negative for chest pain.   Gastrointestinal:  Negative for abdominal pain and constipation.   Endocrine: Negative for polydipsia, polyphagia and polyuria.   Genitourinary:  Negative for difficulty urinating, frequency and hot flashes.   Musculoskeletal:  Positive for neck pain.   Allergic/Immunologic: Negative for environmental allergies.   Neurological:  Positive for headaches. Negative for dizziness and light-headedness.   Psychiatric/Behavioral:  Negative for agitation.                 Vitals:    09/26/23 0846 09/26/23 0906   BP: (!) 134/93 (!) 140/92   BP Location: Right arm    Patient Position: Sitting    BP Method: Large (Automatic)    Pulse: 93    Resp: 17    Temp: 97.3 °F (36.3 °C)    TempSrc: Temporal    SpO2: 98%    Weight: 83.4 kg (183 lb 12.8 oz)    Height: 5' 6" (1.676 m)         Physical Exam  Vitals and nursing note reviewed.   Constitutional:       Appearance: Normal appearance.   Cardiovascular:      Rate and Rhythm: Normal rate and regular rhythm.      Pulses: Normal pulses.      Heart sounds: Normal heart sounds.   Pulmonary:      Effort: Pulmonary effort is normal.      Breath sounds: Normal breath sounds.   Abdominal:      General: Abdomen is flat. Bowel sounds are normal.      Palpations: Abdomen is soft.   Musculoskeletal:         General: Normal range of motion.   Skin:     General: Skin is warm and dry.   Neurological:      General: No focal deficit present.      Mental Status: She is alert and oriented to person, place, and time. Mental status is at baseline.           Last Labs:     Lab Visit on 09/01/2023   Component Date Value    Folate 09/01/2023 6.7     TSH 09/01/2023 1.250     Vitamin B12 09/01/2023 409     Vitamin D 25-Hydroxy, Bl* 09/01/2023 <4.2        Last Imaging:  X-ray Shoulder 2 or More Views Right  Narrative: EXAMINATION:  XR SHOULDER COMPLETE 2 OR MORE VIEWS RIGHT    CLINICAL HISTORY:  Pain in right " shoulder    COMPARISON:  Right shoulder x-ray July 27, 2023    TECHNIQUE:  Frontal views of the right shoulder were obtained in internal and external rotation .    FINDINGS:  No convincing acute fracture or dislocation demonstrated. No concerning radiopaque foreign body visualized.  Impression: As above.    Point of Service: [facility]    Electronically signed by: Robert Parham  Date:    08/24/2023  Time:    11:16         **Labs and x-rays personally reviewed by me    ** reviewed      Objective:        Assessment:       1. Osteoarthritis of right shoulder, unspecified osteoarthritis type  Ambulatory referral/consult to Orthopedics           Plan:         [unfilled]

## 2023-10-04 ENCOUNTER — OFFICE VISIT (OUTPATIENT)
Dept: ORTHOPEDICS | Facility: CLINIC | Age: 33
End: 2023-10-04
Payer: MEDICAID

## 2023-10-04 VITALS — WEIGHT: 183 LBS | HEIGHT: 66 IN | BODY MASS INDEX: 29.41 KG/M2

## 2023-10-04 DIAGNOSIS — M54.12 CERVICAL RADICULOPATHY: Primary | ICD-10-CM

## 2023-10-04 PROCEDURE — 3044F HG A1C LEVEL LT 7.0%: CPT | Mod: CPTII,,, | Performed by: ORTHOPAEDIC SURGERY

## 2023-10-04 PROCEDURE — 99213 OFFICE O/P EST LOW 20 MIN: CPT | Mod: PBBFAC | Performed by: ORTHOPAEDIC SURGERY

## 2023-10-04 PROCEDURE — 3008F PR BODY MASS INDEX (BMI) DOCUMENTED: ICD-10-PCS | Mod: CPTII,,, | Performed by: ORTHOPAEDIC SURGERY

## 2023-10-04 PROCEDURE — 1159F PR MEDICATION LIST DOCUMENTED IN MEDICAL RECORD: ICD-10-PCS | Mod: CPTII,,, | Performed by: ORTHOPAEDIC SURGERY

## 2023-10-04 PROCEDURE — 3008F BODY MASS INDEX DOCD: CPT | Mod: CPTII,,, | Performed by: ORTHOPAEDIC SURGERY

## 2023-10-04 PROCEDURE — 3044F PR MOST RECENT HEMOGLOBIN A1C LEVEL <7.0%: ICD-10-PCS | Mod: CPTII,,, | Performed by: ORTHOPAEDIC SURGERY

## 2023-10-04 PROCEDURE — 99213 OFFICE O/P EST LOW 20 MIN: CPT | Mod: S$PBB,,, | Performed by: ORTHOPAEDIC SURGERY

## 2023-10-04 PROCEDURE — 1159F MED LIST DOCD IN RCRD: CPT | Mod: CPTII,,, | Performed by: ORTHOPAEDIC SURGERY

## 2023-10-04 PROCEDURE — 99213 PR OFFICE/OUTPT VISIT, EST, LEVL III, 20-29 MIN: ICD-10-PCS | Mod: S$PBB,,, | Performed by: ORTHOPAEDIC SURGERY

## 2023-10-04 NOTE — PROGRESS NOTES
Patient is here for follow-up of the MRI of her C-spine did not show any definite stenosis she says she has arthritis in her neck she is having neck pain I will refer to pain treatment per her request for her neck pain.  She rescheduled her EMGs of her upper extremity.  She is still having tingling over the dorsal aspect of her hand.  Will get the EMGs follow back up after her EMGs.  I will refer to pain treatment.  No pain in the shoulder.

## 2023-10-09 ENCOUNTER — OFFICE VISIT (OUTPATIENT)
Dept: PAIN MEDICINE | Facility: CLINIC | Age: 33
End: 2023-10-09
Payer: MEDICAID

## 2023-10-09 VITALS
SYSTOLIC BLOOD PRESSURE: 122 MMHG | BODY MASS INDEX: 29.25 KG/M2 | WEIGHT: 182 LBS | DIASTOLIC BLOOD PRESSURE: 93 MMHG | HEART RATE: 91 BPM | HEIGHT: 66 IN

## 2023-10-09 DIAGNOSIS — Z79.899 ENCOUNTER FOR LONG-TERM (CURRENT) USE OF OTHER MEDICATIONS: Primary | ICD-10-CM

## 2023-10-09 DIAGNOSIS — M54.12 CERVICAL RADICULOPATHY: Chronic | ICD-10-CM

## 2023-10-09 DIAGNOSIS — M47.812 SPONDYLOSIS OF CERVICAL JOINT WITHOUT MYELOPATHY: Chronic | ICD-10-CM

## 2023-10-09 LAB

## 2023-10-09 PROCEDURE — 3074F PR MOST RECENT SYSTOLIC BLOOD PRESSURE < 130 MM HG: ICD-10-PCS | Mod: CPTII,,, | Performed by: PAIN MEDICINE

## 2023-10-09 PROCEDURE — 3080F DIAST BP >= 90 MM HG: CPT | Mod: CPTII,,, | Performed by: PAIN MEDICINE

## 2023-10-09 PROCEDURE — 1159F MED LIST DOCD IN RCRD: CPT | Mod: CPTII,,, | Performed by: PAIN MEDICINE

## 2023-10-09 PROCEDURE — 3008F BODY MASS INDEX DOCD: CPT | Mod: CPTII,,, | Performed by: PAIN MEDICINE

## 2023-10-09 PROCEDURE — 3008F PR BODY MASS INDEX (BMI) DOCUMENTED: ICD-10-PCS | Mod: CPTII,,, | Performed by: PAIN MEDICINE

## 2023-10-09 PROCEDURE — G0480 DRUG TEST DEF 1-7 CLASSES: HCPCS | Mod: 90,,, | Performed by: CLINICAL MEDICAL LABORATORY

## 2023-10-09 PROCEDURE — 99999PBSHW POCT URINE DRUG SCREEN PRESUMP: ICD-10-PCS | Mod: PBBFAC,,,

## 2023-10-09 PROCEDURE — 3074F SYST BP LT 130 MM HG: CPT | Mod: CPTII,,, | Performed by: PAIN MEDICINE

## 2023-10-09 PROCEDURE — 80307 PR DRUG TEST,PRESUMP, ANY NUMBER OF CLASS, BY INSTRMNT CHEM ANALYZERS: ICD-10-PCS | Mod: 90,,, | Performed by: CLINICAL MEDICAL LABORATORY

## 2023-10-09 PROCEDURE — 99204 PR OFFICE/OUTPT VISIT, NEW, LEVL IV, 45-59 MIN: ICD-10-PCS | Mod: S$PBB,,, | Performed by: PAIN MEDICINE

## 2023-10-09 PROCEDURE — 3044F HG A1C LEVEL LT 7.0%: CPT | Mod: CPTII,,, | Performed by: PAIN MEDICINE

## 2023-10-09 PROCEDURE — 1159F PR MEDICATION LIST DOCUMENTED IN MEDICAL RECORD: ICD-10-PCS | Mod: CPTII,,, | Performed by: PAIN MEDICINE

## 2023-10-09 PROCEDURE — 99204 OFFICE O/P NEW MOD 45 MIN: CPT | Mod: S$PBB,,, | Performed by: PAIN MEDICINE

## 2023-10-09 PROCEDURE — G0480 PR DRUG TEST DEF 1-7 CLASSES: ICD-10-PCS | Mod: 90,,, | Performed by: CLINICAL MEDICAL LABORATORY

## 2023-10-09 PROCEDURE — 80307 DRUG TEST PRSMV CHEM ANLYZR: CPT | Mod: 90,,, | Performed by: CLINICAL MEDICAL LABORATORY

## 2023-10-09 PROCEDURE — 99999PBSHW POCT URINE DRUG SCREEN PRESUMP: Mod: PBBFAC,,,

## 2023-10-09 PROCEDURE — 3044F PR MOST RECENT HEMOGLOBIN A1C LEVEL <7.0%: ICD-10-PCS | Mod: CPTII,,, | Performed by: PAIN MEDICINE

## 2023-10-09 PROCEDURE — 3080F PR MOST RECENT DIASTOLIC BLOOD PRESSURE >= 90 MM HG: ICD-10-PCS | Mod: CPTII,,, | Performed by: PAIN MEDICINE

## 2023-10-09 PROCEDURE — 80305 DRUG TEST PRSMV DIR OPT OBS: CPT | Mod: PBBFAC | Performed by: PAIN MEDICINE

## 2023-10-09 PROCEDURE — 99214 OFFICE O/P EST MOD 30 MIN: CPT | Mod: PBBFAC | Performed by: PAIN MEDICINE

## 2023-10-09 RX ORDER — GABAPENTIN 300 MG/1
300 CAPSULE ORAL 3 TIMES DAILY
Qty: 90 CAPSULE | Refills: 0 | Status: SHIPPED | OUTPATIENT
Start: 2023-10-09 | End: 2023-12-04

## 2023-10-09 NOTE — PROGRESS NOTES
"Chronic Pain - New Consult    Referring Physician: No ref. provider found       SUBJECTIVE: Disclaimer: This note has been generated using voice-recognition software. There may be typographical errors that have been missed during proof-reading      Initial encounter:    Elsa Duran complains of neck pain. 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 Onset of symptoms 1 year ago, rapidly worsening since that time. Current symptoms are  radicular symptoms to the right shoulder and hand with paresthesia and weakness . Patient denies  left upper extremity involvement . Patient has had recurrent self limited episodes of neck pain in the past.  Previous treatments include: None.  She has an EMG pending in [place] October 24, 2023.  MRI of the cervical spine revealed osteophyte formation at C6-7 and C7-T1 facet hypertrophy.      Pain Assessment  Pain Assessment: 0-10  Pain Score:   9  Pain Location:  (Bilateral neck and shoulder pain)  Pain Descriptors: Cramping, Aching, Shooting, Sharp  Pain Frequency: Constant/continuous  Pain Onset: Gradual  Clinical Progression: Gradually worsening  Aggravating Factors: Standing (sitting for long periods)  Pain Intervention(s): Medication (See eMAR), Home medication, Rest      Physical Therapy/Home Exercise: no        Pain Medications:  has a current medication list which includes the following prescription(s): amlodipine, marycarmen-be, topiramate, dicyclomine, ergocalciferol, gabapentin, ibuprofen, naproxen, and polyethylene glycol.      Tried in Past:  NSAIDS-yes  TCA-no  SNRI-no  Anti-convulsants-no  Muscle Relaxants-no  Opioids-no  Benzodiazepines-no     4A"s of Opioid Risk Assessment  Activity: Patient has difficulty performing ADL  Analgesia:  Patient's pain is partially controlled by current medication.   Aberrant Behavior:  reviewed with no aberrant drug " seeking/taking behavior     report:  Reviewed and consistent with medication use as prescribed.    Patient denies suicidal or homicidal ideations    Pain interventional therapy-no    Chiropractor -no  Acupuncture - no  TENS unit -no  Spinal decompression -no  Joint replacement -no     Review of Systems   Constitutional: Negative.    HENT: Negative.     Eyes: Negative.    Respiratory: Negative.     Cardiovascular: Negative.    Gastrointestinal: Negative.    Endocrine: Negative.    Genitourinary: Negative.    Musculoskeletal:  Positive for arthralgias, neck pain and neck stiffness.   Integumentary:  Negative.   Neurological:  Positive for weakness (Right upper extremity) and numbness (Right upper extremity).   Hematological: Negative.    Psychiatric/Behavioral: Negative.               X-ray Shoulder 2 or More Views Right  Narrative: EXAMINATION:  XR SHOULDER COMPLETE 2 OR MORE VIEWS RIGHT    CLINICAL HISTORY:  Pain in right shoulder    COMPARISON:  Right shoulder x-ray 2023    TECHNIQUE:  Frontal views of the right shoulder were obtained in internal and external rotation .    FINDINGS:  No convincing acute fracture or dislocation demonstrated. No concerning radiopaque foreign body visualized.  Impression: As above.    Point of Service: [facility]    Electronically signed by: Robert Parham  Date:    2023  Time:    11:16         Past Medical History:   Diagnosis Date    History of gonorrhea     Hypertension     Migraine without aura, intractable, without status migrainosus     Noncompliance with medication regimen     Syncope      Past Surgical History:   Procedure Laterality Date    Abdominal Wall Mass Surgery  2019     SECTION  08/10/2008     SECTION  01/15/2013    DILATION AND CURETTAGE OF UTERUS  2012     Social History     Socioeconomic History    Marital status: Single   Tobacco Use    Smoking status: Every Day     Current packs/day: 1.00     Average  "packs/day: 1 pack/day for 17.8 years (17.8 ttl pk-yrs)     Types: Cigarettes     Start date: 2006    Smokeless tobacco: Never   Substance and Sexual Activity    Alcohol use: Never    Drug use: Never    Sexual activity: Yes     Partners: Male     Family History   Problem Relation Age of Onset    Hypertension Mother     Hypertension Father     No Known Problems Brother     No Known Problems Sister     No Known Problems Sister     No Known Problems Sister      Review of patient's allergies indicates:  No Known Allergies      OBJECTIVE:  Vitals:    10/09/23 1059   BP: (!) 122/93   Pulse: 91     BP (!) 122/93   Pulse 91   Ht 5' 6" (1.676 m)   Wt 82.6 kg (182 lb)   LMP 09/15/2023 (Approximate)   BMI 29.38 kg/m²   Physical Exam  Vitals and nursing note reviewed.   Constitutional:       General: She is not in acute distress.     Appearance: Normal appearance. She is not ill-appearing, toxic-appearing or diaphoretic.   HENT:      Head: Normocephalic and atraumatic.      Nose: Nose normal.      Mouth/Throat:      Mouth: Mucous membranes are moist.   Eyes:      Extraocular Movements: Extraocular movements intact.      Pupils: Pupils are equal, round, and reactive to light.   Cardiovascular:      Rate and Rhythm: Normal rate and regular rhythm.      Heart sounds: Normal heart sounds.   Pulmonary:      Effort: Pulmonary effort is normal. No respiratory distress.      Breath sounds: Normal breath sounds. No stridor. No wheezing or rhonchi.   Abdominal:      General: Bowel sounds are normal.      Palpations: Abdomen is soft.   Musculoskeletal:         General: No swelling or deformity.      Cervical back: Spasms and tenderness present. Pain with movement present. Decreased range of motion.      Thoracic back: Normal.      Lumbar back: No spasms, tenderness or bony tenderness. Normal range of motion. Negative right straight leg raise test and negative left straight leg raise test. No scoliosis.      Right lower leg: No edema. "      Left lower leg: No edema.      Comments: Cervical pain with flexion and extension.  Bilateral cervical facet tenderness to palpation from C5-T1.   Skin:     General: Skin is warm.   Neurological:      General: No focal deficit present.      Mental Status: She is alert and oriented to person, place, and time. Mental status is at baseline.      Cranial Nerves: No cranial nerve deficit.      Sensory: Sensation is intact. No sensory deficit.      Motor: No weakness.      Coordination: Coordination normal.      Gait: Gait normal.      Deep Tendon Reflexes: Reflexes are normal and symmetric.   Psychiatric:         Mood and Affect: Mood normal.         Behavior: Behavior normal.            ASSESSMENT: 33 y.o. year old female with pain, consistent with     Encounter Diagnoses   Name Primary?    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 reviewed  2..Addiction, Dependency, Tolerance, Opioid abuse-misuse, Death, Diversion Discussed. Overdose reversal drug Naloxone discussed  2.UDS point of care obtained for new patient evaluation and consultation. We will obtain a definitve UDS for confirmation.  3. Opioid contract signed today  4.Refill/ Continue medications for pain control and function.  Start gabapentin for cervical radiculopathy       Requested Prescriptions     Signed Prescriptions Disp Refills    gabapentin (NEURONTIN) 300 MG capsule 90 capsule 0     Sig: Take 1 capsule (300 mg total) by mouth 3 (three) times daily.     5. Urine drug screen and confirmation testing was ordered as documented on the requisition form in order to monitor for compliance with prescribed opiates and to ensure that there was no misuse/abuse of other non-prescribed opiates or illicit drugs.  I will determine if the patient is suitable for continuation of opioid therapy after obtaining  the definitive urine drug screen  for confirmation.  6. Start physical therapy 2 to 3 times week for cervical radiculopathy  Orders Placed This Encounter   Procedures    Drug Screen Definitive 14, Urine     Standing Status:   Future     Number of Occurrences:   1     Standing Expiration Date:   12/7/2024     Order Specific Question:   Specimen Source     Answer:   Urine    Ambulatory referral/consult to Physical/Occupational Therapy     Standing Status:   Future     Standing Expiration Date:   11/9/2024     Referral Priority:   Routine     Referral Type:   Physical Medicine     Referral Reason:   Specialty Services Required     Number of Visits Requested:   1    POCT Urine Drug Screen ([facility])     Interpretive Information:     Negative:  No drug detected at the cut off level.   Positive:  This result represents presumptive positive for the   tested drug, other substances may yield a positive response other   than the analyte of interest. This result should be utilized for   diagnostic purpose only. Confirmation testing will be performed upon physician request only.         7. Consider MRI of the cervical spine after completion of physical therapy and if indicated  8. Return in 3-4 weeks for re-evaluation    The total time spent for evaluation and management on 10/10/2023 including reviewing separately obtained history, performing a medically appropriate exam and evaluation, documenting clinical information in the health record, independently interpreting results and communicating them to the patient/family/caregiver, and ordering medications/tests/procedures was between 15-29 minutes.    The above plan and management options were discussed at length with patient. Patient is in agreement with the above and verbalized understanding. It will be communicated with the referring physician via electronic record, fax, or mail.    Sydnie Vora  10/10/2023

## 2023-10-12 ENCOUNTER — PATIENT MESSAGE (OUTPATIENT)
Dept: PAIN MEDICINE | Facility: CLINIC | Age: 33
End: 2023-10-12
Payer: MEDICAID

## 2023-10-12 DIAGNOSIS — E55.9 VITAMIN D DEFICIENCY: Primary | ICD-10-CM

## 2023-10-12 RX ORDER — ERGOCALCIFEROL 1.25 MG/1
50000 CAPSULE ORAL
Qty: 4 CAPSULE | Refills: 2 | Status: SHIPPED | OUTPATIENT
Start: 2023-10-12

## 2023-10-14 LAB — MAYO GENERIC ORDERABLE RESULT: NORMAL

## 2023-10-17 ENCOUNTER — PATIENT MESSAGE (OUTPATIENT)
Dept: PAIN MEDICINE | Facility: CLINIC | Age: 33
End: 2023-10-17
Payer: MEDICAID

## 2023-10-19 ENCOUNTER — TELEPHONE (OUTPATIENT)
Dept: PAIN MEDICINE | Facility: CLINIC | Age: 33
End: 2023-10-19
Payer: MEDICAID

## 2023-10-19 ENCOUNTER — TELEPHONE (OUTPATIENT)
Dept: ORTHOPEDICS | Facility: CLINIC | Age: 33
End: 2023-10-19
Payer: MEDICAID

## 2023-10-19 DIAGNOSIS — M54.12 CERVICAL RADICULOPATHY: Primary | ICD-10-CM

## 2023-10-19 NOTE — TELEPHONE ENCOUNTER
----- Message from Marium Elizabeth sent at 10/17/2023  2:30 PM CDT -----  Pt wants a referral to [facility]. She did not like Dr Sydnie Vora.

## 2023-10-19 NOTE — TELEPHONE ENCOUNTER
----- Message from Patsy Ramos sent at 10/18/2023  1:39 PM CDT -----  [phone] patient stated her gabapentin isn't helping please call patient     I have attempted to call patient back at given call back number with no answer. Voicemail has been left for patient to call our office at [phone]. 10/19/23@9:16 am

## 2023-10-26 ENCOUNTER — TELEPHONE (OUTPATIENT)
Dept: ORTHOPEDICS | Facility: CLINIC | Age: 33
End: 2023-10-26
Payer: MEDICAID

## 2023-10-26 NOTE — TELEPHONE ENCOUNTER
----- Message from Lexie Toledo sent at 10/25/2023  1:20 PM CDT -----  Type:  Sooner Apoointment Request    Caller is requesting a sooner appointment.  Caller declined first available appointment listed below.  Caller will not accept being placed on the waitlist and is requesting a message be sent to doctor.  Name of Caller:pt   When is the first available appointment?11/13/23  Symptoms:Neck and shoulder pain  Would the patient rather a call back or a response via [facility]?  call  Best Call Back Number:[phone]  Additional Information: Preferred Date Range: 10/26/2023 - 10/27/2023

## 2023-11-13 ENCOUNTER — OFFICE VISIT (OUTPATIENT)
Dept: ORTHOPEDICS | Facility: CLINIC | Age: 33
End: 2023-11-13
Payer: MEDICAID

## 2023-11-13 VITALS — WEIGHT: 182 LBS | BODY MASS INDEX: 29.25 KG/M2 | HEIGHT: 66 IN

## 2023-11-13 DIAGNOSIS — Z01.812 PRE-OPERATIVE LABORATORY EXAMINATION: ICD-10-CM

## 2023-11-13 DIAGNOSIS — M19.011 OSTEOARTHRITIS OF RIGHT SHOULDER, UNSPECIFIED OSTEOARTHRITIS TYPE: ICD-10-CM

## 2023-11-13 DIAGNOSIS — M54.12 CERVICAL RADICULOPATHY: ICD-10-CM

## 2023-11-13 DIAGNOSIS — G56.03 BILATERAL CARPAL TUNNEL SYNDROME: Primary | ICD-10-CM

## 2023-11-13 PROBLEM — Z13.1 SCREENING FOR DIABETES MELLITUS (DM): Status: RESOLVED | Noted: 2023-08-09 | Resolved: 2023-11-13

## 2023-11-13 PROCEDURE — 1159F MED LIST DOCD IN RCRD: CPT | Mod: CPTII,,, | Performed by: ORTHOPAEDIC SURGERY

## 2023-11-13 PROCEDURE — 1159F PR MEDICATION LIST DOCUMENTED IN MEDICAL RECORD: ICD-10-PCS | Mod: CPTII,,, | Performed by: ORTHOPAEDIC SURGERY

## 2023-11-13 PROCEDURE — 3008F PR BODY MASS INDEX (BMI) DOCUMENTED: ICD-10-PCS | Mod: CPTII,,, | Performed by: ORTHOPAEDIC SURGERY

## 2023-11-13 PROCEDURE — 99214 PR OFFICE/OUTPT VISIT, EST, LEVL IV, 30-39 MIN: ICD-10-PCS | Mod: S$PBB,,, | Performed by: ORTHOPAEDIC SURGERY

## 2023-11-13 PROCEDURE — 3044F PR MOST RECENT HEMOGLOBIN A1C LEVEL <7.0%: ICD-10-PCS | Mod: CPTII,,, | Performed by: ORTHOPAEDIC SURGERY

## 2023-11-13 PROCEDURE — 3008F BODY MASS INDEX DOCD: CPT | Mod: CPTII,,, | Performed by: ORTHOPAEDIC SURGERY

## 2023-11-13 PROCEDURE — 3044F HG A1C LEVEL LT 7.0%: CPT | Mod: CPTII,,, | Performed by: ORTHOPAEDIC SURGERY

## 2023-11-13 PROCEDURE — 99214 OFFICE O/P EST MOD 30 MIN: CPT | Mod: S$PBB,,, | Performed by: ORTHOPAEDIC SURGERY

## 2023-11-13 PROCEDURE — 99213 OFFICE O/P EST LOW 20 MIN: CPT | Mod: PBBFAC | Performed by: ORTHOPAEDIC SURGERY

## 2023-11-13 NOTE — PROGRESS NOTES
Patient is here for EMG studies of bilateral upper extremities.  Shows she has carpal tunnel syndrome bilaterally.  She is having more symptoms in her median distribution of the right hand in his greater than left.  She does have positive Tinel's and Phalen's test.  We discussed treatment options.  She wished to proceed with carpal tunnel release.  We are going to set her up for the carpal tunnel release on the right.  She is taken anti-inflammatories tried night bracing not getting relief the symptoms we will set her up for surgery in the near future.  Risks and benefits of surgery were discussed at length patient understands risks and benefits.

## 2023-11-13 NOTE — PATIENT INSTRUCTIONS
Your surgery is scheduled at [facility] in [place] for 2023    Pre-Op Testin2023    ___x____ Lab (Clinic Lab 1st Floor)  _______ Chest X-ray ([facility] 1st Floor)  _______ EKG (Clinic 2nd Floor)    Our office will contact you the day before surgery to give you  the arrival time.    *Do NOT eat or drink anything after midnight the night before your surgery.    *Bring all medications in their original bottles.    *Bring anything you may need for an overnight stay.     *Bathe with Hibiclens the night or morning before surgery.    *The morning of your surgery ONLY take blood pressure medications, heart medications, medications for acid reflux, and thyroid medications (the morning dose only).  *Take these medications with a sip of water.    *Do not take Insulin or Diabetic Medications the night before or the morning of your surgery, unless directed otherwise.    *Be sure that you have stopped blood thinners at the appropriate time, as instructed.  (_____ days prior to surgery)    *Bring your C-Pap machine if you have one.    *All jewelry and piercings MUST be removed prior to surgery.    *False eye lashes must be removed prior to surgery.    *Any questions regarding co-pays or deductibles with insurance, please contact the [facility] at #[phone].    *For Financial Assistance you may call #[phone] or #[phone].    Thank you for choosing Dr. Sameer Kingsley for your Orthopedic needs.  We look forward to caring for you. If you have any questions, please contact our office at [phone].

## 2023-12-04 ENCOUNTER — OFFICE VISIT (OUTPATIENT)
Dept: NEUROLOGY | Facility: CLINIC | Age: 33
End: 2023-12-04
Payer: MEDICAID

## 2023-12-04 VITALS
SYSTOLIC BLOOD PRESSURE: 138 MMHG | OXYGEN SATURATION: 99 % | DIASTOLIC BLOOD PRESSURE: 70 MMHG | HEART RATE: 82 BPM | HEIGHT: 66 IN | BODY MASS INDEX: 29.89 KG/M2 | WEIGHT: 186 LBS

## 2023-12-04 DIAGNOSIS — E55.9 VITAMIN D DEFICIENCY: ICD-10-CM

## 2023-12-04 DIAGNOSIS — G43.709 CHRONIC MIGRAINE WITHOUT AURA WITHOUT STATUS MIGRAINOSUS, NOT INTRACTABLE: Primary | ICD-10-CM

## 2023-12-04 PROCEDURE — 99214 OFFICE O/P EST MOD 30 MIN: CPT | Mod: PBBFAC | Performed by: NURSE PRACTITIONER

## 2023-12-04 PROCEDURE — 3078F DIAST BP <80 MM HG: CPT | Mod: CPTII,,, | Performed by: NURSE PRACTITIONER

## 2023-12-04 PROCEDURE — 3075F SYST BP GE 130 - 139MM HG: CPT | Mod: CPTII,,, | Performed by: NURSE PRACTITIONER

## 2023-12-04 PROCEDURE — 3078F PR MOST RECENT DIASTOLIC BLOOD PRESSURE < 80 MM HG: ICD-10-PCS | Mod: CPTII,,, | Performed by: NURSE PRACTITIONER

## 2023-12-04 PROCEDURE — 1159F MED LIST DOCD IN RCRD: CPT | Mod: CPTII,,, | Performed by: NURSE PRACTITIONER

## 2023-12-04 PROCEDURE — 1160F RVW MEDS BY RX/DR IN RCRD: CPT | Mod: CPTII,,, | Performed by: NURSE PRACTITIONER

## 2023-12-04 PROCEDURE — 3075F PR MOST RECENT SYSTOLIC BLOOD PRESS GE 130-139MM HG: ICD-10-PCS | Mod: CPTII,,, | Performed by: NURSE PRACTITIONER

## 2023-12-04 PROCEDURE — 3008F PR BODY MASS INDEX (BMI) DOCUMENTED: ICD-10-PCS | Mod: CPTII,,, | Performed by: NURSE PRACTITIONER

## 2023-12-04 PROCEDURE — 99213 OFFICE O/P EST LOW 20 MIN: CPT | Mod: S$PBB,,, | Performed by: NURSE PRACTITIONER

## 2023-12-04 PROCEDURE — 1160F PR REVIEW ALL MEDS BY PRESCRIBER/CLIN PHARMACIST DOCUMENTED: ICD-10-PCS | Mod: CPTII,,, | Performed by: NURSE PRACTITIONER

## 2023-12-04 PROCEDURE — 99213 PR OFFICE/OUTPT VISIT, EST, LEVL III, 20-29 MIN: ICD-10-PCS | Mod: S$PBB,,, | Performed by: NURSE PRACTITIONER

## 2023-12-04 PROCEDURE — 3044F HG A1C LEVEL LT 7.0%: CPT | Mod: CPTII,,, | Performed by: NURSE PRACTITIONER

## 2023-12-04 PROCEDURE — 1159F PR MEDICATION LIST DOCUMENTED IN MEDICAL RECORD: ICD-10-PCS | Mod: CPTII,,, | Performed by: NURSE PRACTITIONER

## 2023-12-04 PROCEDURE — 3008F BODY MASS INDEX DOCD: CPT | Mod: CPTII,,, | Performed by: NURSE PRACTITIONER

## 2023-12-04 PROCEDURE — 3044F PR MOST RECENT HEMOGLOBIN A1C LEVEL <7.0%: ICD-10-PCS | Mod: CPTII,,, | Performed by: NURSE PRACTITIONER

## 2023-12-04 RX ORDER — TOPIRAMATE SPINKLE 15 MG/1
CAPSULE ORAL
COMMUNITY
End: 2023-12-04

## 2023-12-04 RX ORDER — PROPRANOLOL HYDROCHLORIDE 40 MG/1
40 TABLET ORAL 2 TIMES DAILY
Qty: 60 TABLET | Refills: 11 | Status: SHIPPED | OUTPATIENT
Start: 2023-12-04 | End: 2024-12-03

## 2023-12-04 RX ORDER — IBUPROFEN 600 MG/1
TABLET ORAL
COMMUNITY
Start: 2023-11-20 | End: 2024-01-19

## 2023-12-04 NOTE — PATIENT INSTRUCTIONS
1. Decrease the topamax to once daily for a week then stop    2. Continue encourage patient not to use tylenol or motrin as needed.  Only use the prescribed naproxen and phenergan as directed and not more than 3 days of the week    3. Start propranolol 40mg daily for a week then twice daily    3. Encouraged to obtain the vitamin D supplement and take as directed

## 2023-12-04 NOTE — PROGRESS NOTES
Subjective:       Patient ID: Elsa Duran is a 33 y.o. female     Chief Complaint:    No chief complaint on file.       Allergies:  Patient has no known allergies.    Current Medications:    Outpatient Encounter Medications as of 12/4/2023   Medication Sig Dispense Refill    amLODIPine (NORVASC) 10 MG tablet Take 1 tablet (10 mg total) by mouth once daily. 90 tablet 1    ergocalciferol (ERGOCALCIFEROL) 50,000 unit Cap Take 1 capsule (50,000 Units total) by mouth every 7 days. 4 capsule 2    gabapentin (NEURONTIN) 300 MG capsule Take 1 capsule (300 mg total) by mouth 3 (three) times daily. 90 capsule 0    TIFFANY-BE 0.35 mg tablet Take 1 tablet by mouth.      polyethylene glycol (GLYCOLAX) 17 gram PwPk Take 17 g by mouth once daily. (Patient not taking: Reported on 10/9/2023) 30 each 1    topiramate (TOPAMAX) 50 MG tablet Take 1 tablet (50 mg total) by mouth 2 (two) times daily. 60 tablet 2     No facility-administered encounter medications on file as of 12/4/2023.       History of Present Illness  33 yr old A.A. female presents with headaches and prior reported syncope.    Prior workup has been hampered by non-compliance with testing and medication treatment as well as non-compliance with followup.  Prior to last visit 3 months ago she had not seen seen in clinic in 2 years.  She actually missed followup with us 10/17/23.  Prior MRIs were not obtained when ordered and ultimately it was March of 2021 before MRI and MRA were obtained and were negative.  EEG negative as well. She never kept her followups with cardiology which we referred to for evaluation of the syncope as well.    Prior prescribed topamax, she reported was not effective, but the pharmacy reported she never started the medication because she never obtained it per their records.  I see her primary care restarted it 7 days prior to her last visit, 3 months ago.  So it was too early to know if it was effective, thus the reason we scheduled her  followup in October which she did not show for.  I did find low vitamin D in her labs, I sent in supplement but to no surprise today reports has not obtained or taken it...    Prior was prescribed propranolol for headaches, but also for poorly controlled HTN.  She prior (2 years ago) reported her headaches did better when she was on it, but last visit was no longer taking.    In the past on all visits she openly admitted to daily use of OTC motrin or tylenol for headache.  She denies this today.  She is not a candidate for triptans due to her poorly controlled BP and given her history of overuse headaches fioricet is a poor choice in any case and not recommended anyway.           Review of Systems  Review of Systems   Constitutional:  Negative for diaphoresis and fever.   HENT:  Negative for congestion, hearing loss and tinnitus.    Eyes:  Negative for blurred vision, double vision, photophobia, discharge and redness.   Respiratory:  Negative for cough and shortness of breath.    Cardiovascular:  Negative for chest pain.   Gastrointestinal:  Negative for abdominal pain, nausea and vomiting.   Musculoskeletal:  Negative for back pain, joint pain, myalgias and neck pain.   Skin:  Negative for itching and rash.   Neurological:  Positive for headaches. Negative for dizziness, tremors, sensory change, speech change, focal weakness, seizures, loss of consciousness and weakness.   Psychiatric/Behavioral:  Negative for depression, hallucinations and memory loss. The patient does not have insomnia.    All other systems reviewed and are negative.     Objective:     NEUROLOGICAL EXAMINATION:     MENTAL STATUS   Oriented to person, place, and time.   Registration: recalls 3 of 3 objects. Recall at 5 minutes: recalls 3 of 3 objects.   Attention: normal. Concentration: normal.   Speech: speech is normal   Level of consciousness: alert  Knowledge: good and consistent with education.   Normal comprehension.     CRANIAL NERVES      CN II   Visual fields full to confrontation.   Visual acuity: normal  Right visual field deficit: none  Left visual field deficit: none     CN III, IV, VI   Pupils are equal, round, and reactive to light.  Extraocular motions are normal.   Right pupil: Size: 3 mm. Shape: regular. Reactivity: brisk. Consensual response: intact. Accommodation: intact.   Left pupil: Size: 3 mm. Shape: regular. Reactivity: brisk. Consensual response: intact. Accommodation: intact.   CN III: no CN III palsy  CN VI: no CN VI palsy  Nystagmus: none   Diplopia: none  Upgaze: normal  Downgaze: normal  Conjugate gaze: present  Vestibulo-ocular reflex: present    CN V   Facial sensation intact.   Right facial sensation deficit: none  Left facial sensation deficit: none  Right corneal reflex: normal  Left corneal reflex: normal    CN VII   Facial expression full, symmetric.   Right facial weakness: none  Left facial weakness: none  Right taste: normal  Left taste: normal    CN VIII   CN VIII normal.   Hearing: intact    CN IX, X   CN IX normal.   CN X normal.   Palate: symmetric    CN XI   CN XI normal.   Right sternocleidomastoid strength: normal  Left sternocleidomastoid strength: normal  Right trapezius strength: normal  Left trapezius strength: normal    CN XII   CN XII normal.   Tongue: not atrophic  Fasciculations: absent  Tongue deviation: none    MOTOR EXAM   Muscle bulk: normal  Overall muscle tone: normal  Right arm tone: normal  Left arm tone: normal  Right arm pronator drift: absent  Left arm pronator drift: absent  Right leg tone: normal  Left leg tone: normal    Strength   Right neck flexion: 5/5  Left neck flexion: 5/5  Right neck extension: 5/5  Left neck extension: 5/5  Right deltoid: 5/5  Left deltoid: 5/5  Right biceps: 5/5  Left biceps: 5/5  Right triceps: 5/5  Left triceps: 5/5  Right wrist flexion: 5/5  Left wrist flexion: 5/5  Right wrist extension: 5/5  Left wrist extension: 5/5  Right interossei: 5/5  Left  interossei: 5/5  Right iliopsoas: 5/5  Left iliopsoas: 5/5  Right quadriceps: 5/5  Left quadriceps: 5/5  Right hamstrin/5  Left hamstrin/5  Right anterior tibial: 5/5  Left anterior tibial: 5/5  Right posterior tibial: 5/5  Left posterior tibial: 5/5  Right gastroc: 5/5  Left gastroc: 5/5    REFLEXES     Reflexes   Right brachioradialis: 2+  Left brachioradialis: 2+  Right biceps: 2+  Left biceps: 2+  Right triceps: 2+  Left triceps: 2+  Right patellar: 2+  Left patellar: 2+  Right achilles: 2+  Left achilles: 2+  Right plantar: normal  Left plantar: normal  Right Rosario: absent  Left Rosario: absent  Right ankle clonus: absent  Left ankle clonus: absent  Right pendular knee jerk: absent  Left pendular knee jerk: absent    SENSORY EXAM   Light touch normal.   Right arm light touch: normal  Left arm light touch: normal  Right leg light touch: normal  Left leg light touch: normal  Vibration normal.   Right arm vibration: normal  Left arm vibration: normal  Right leg vibration: normal  Left leg vibration: normal  Proprioception normal.   Right arm proprioception: normal  Left arm proprioception: normal  Right leg proprioception: normal  Left leg proprioception: normal  Pinprick normal.   Right arm pinprick: normal  Left arm pinprick: normal  Right leg pinprick: normal  Left leg pinprick: normal  Graphesthesia: normal  Romberg: negative  Stereognosis: normal    GAIT AND COORDINATION     Gait  Gait: normal     Coordination   Finger to nose coordination: normal  Heel to shin coordination: normal  Tandem walking coordination: normal    Tremor   Resting tremor: absent  Intention tremor: absent  Action tremor: absent       Physical Exam  Vitals and nursing note reviewed.   Constitutional:       Appearance: Normal appearance.   HENT:      Head: Normocephalic.   Eyes:      Extraocular Movements: Extraocular movements intact and EOM normal.      Pupils: Pupils are equal, round, and reactive to light.   Cardiovascular:       Rate and Rhythm: Normal rate and regular rhythm.   Pulmonary:      Effort: Pulmonary effort is normal.      Breath sounds: Normal breath sounds.   Musculoskeletal:         General: No swelling or tenderness. Normal range of motion.      Cervical back: Normal range of motion and neck supple.      Right lower leg: No edema.      Left lower leg: No edema.   Skin:     General: Skin is warm and dry.      Coloration: Skin is not jaundiced.      Findings: No rash.   Neurological:      General: No focal deficit present.      Mental Status: She is alert and oriented to person, place, and time.      GCS: GCS eye subscore is 4. GCS verbal subscore is 5. GCS motor subscore is 6.      Cranial Nerves: No cranial nerve deficit.      Sensory: No sensory deficit.      Motor: Motor function is intact. No weakness.      Coordination: Coordination is intact. Coordination normal. Finger-Nose-Finger Test, Heel to Shin Test and Romberg Test normal.      Gait: Gait is intact. Gait and tandem walk normal.      Deep Tendon Reflexes: Reflexes normal.      Reflex Scores:       Tricep reflexes are 2+ on the right side and 2+ on the left side.       Bicep reflexes are 2+ on the right side and 2+ on the left side.       Brachioradialis reflexes are 2+ on the right side and 2+ on the left side.       Patellar reflexes are 2+ on the right side and 2+ on the left side.       Achilles reflexes are 2+ on the right side and 2+ on the left side.  Psychiatric:         Mood and Affect: Mood normal.         Speech: Speech normal.         Behavior: Behavior normal.        Assessment:     Problem List Items Addressed This Visit    None           Primary Diagnosis and ICD10  No primary diagnosis found.    Plan:     There are no Patient Instructions on file for this visit.    There are no discontinued medications.        Requested Prescriptions      No prescriptions requested or ordered in this encounter       No orders of the defined types were placed in  this encounter.

## 2023-12-21 ENCOUNTER — ANESTHESIA EVENT (OUTPATIENT)
Dept: SURGERY | Facility: HOSPITAL | Age: 33
End: 2023-12-21
Payer: MEDICAID

## 2023-12-21 ENCOUNTER — HOSPITAL ENCOUNTER (OUTPATIENT)
Facility: HOSPITAL | Age: 33
Discharge: HOME | End: 2023-12-21
Attending: ORTHOPAEDIC SURGERY | Admitting: ORTHOPAEDIC SURGERY
Payer: MEDICAID

## 2023-12-21 ENCOUNTER — ANESTHESIA (OUTPATIENT)
Dept: SURGERY | Facility: HOSPITAL | Age: 33
End: 2023-12-21
Payer: MEDICAID

## 2023-12-21 VITALS
DIASTOLIC BLOOD PRESSURE: 101 MMHG | HEART RATE: 72 BPM | OXYGEN SATURATION: 99 % | RESPIRATION RATE: 16 BRPM | BODY MASS INDEX: 27.32 KG/M2 | HEIGHT: 66 IN | SYSTOLIC BLOOD PRESSURE: 163 MMHG | WEIGHT: 170 LBS | TEMPERATURE: 98 F

## 2023-12-21 DIAGNOSIS — G56.03 BILATERAL CARPAL TUNNEL SYNDROME: ICD-10-CM

## 2023-12-21 LAB
B-HCG UR QL: NEGATIVE
CTP QC/QA: YES

## 2023-12-21 PROCEDURE — 81025 URINE PREGNANCY TEST: CPT | Performed by: ORTHOPAEDIC SURGERY

## 2023-12-21 PROCEDURE — 71000015 HC POSTOP RECOV 1ST HR: Performed by: ORTHOPAEDIC SURGERY

## 2023-12-21 PROCEDURE — 63600175 PHARM REV CODE 636 W HCPCS: Performed by: NURSE ANESTHETIST, CERTIFIED REGISTERED

## 2023-12-21 PROCEDURE — 37000008 HC ANESTHESIA 1ST 15 MINUTES: Performed by: ORTHOPAEDIC SURGERY

## 2023-12-21 PROCEDURE — D9220A PRA ANESTHESIA: ICD-10-PCS | Mod: CRNA,,, | Performed by: NURSE ANESTHETIST, CERTIFIED REGISTERED

## 2023-12-21 PROCEDURE — 27000177 HC AIRWAY, LARYNGEAL MASK: Performed by: NURSE ANESTHETIST, CERTIFIED REGISTERED

## 2023-12-21 PROCEDURE — 36000706: Performed by: ORTHOPAEDIC SURGERY

## 2023-12-21 PROCEDURE — 71000033 HC RECOVERY, INTIAL HOUR: Performed by: ORTHOPAEDIC SURGERY

## 2023-12-21 PROCEDURE — 63600175 PHARM REV CODE 636 W HCPCS: Performed by: ANESTHESIOLOGY

## 2023-12-21 PROCEDURE — 36000707: Performed by: ORTHOPAEDIC SURGERY

## 2023-12-21 PROCEDURE — D9220A PRA ANESTHESIA: ICD-10-PCS | Mod: ANES,,, | Performed by: ANESTHESIOLOGY

## 2023-12-21 PROCEDURE — 27000716 HC OXISENSOR PROBE, ANY SIZE: Performed by: NURSE ANESTHETIST, CERTIFIED REGISTERED

## 2023-12-21 PROCEDURE — 64721 CARPAL TUNNEL SURGERY: CPT | Mod: RT,,, | Performed by: ORTHOPAEDIC SURGERY

## 2023-12-21 PROCEDURE — D9220A PRA ANESTHESIA: Mod: CRNA,,, | Performed by: NURSE ANESTHETIST, CERTIFIED REGISTERED

## 2023-12-21 PROCEDURE — D9220A PRA ANESTHESIA: Mod: ANES,,, | Performed by: ANESTHESIOLOGY

## 2023-12-21 PROCEDURE — 25000003 PHARM REV CODE 250: Performed by: ORTHOPAEDIC SURGERY

## 2023-12-21 PROCEDURE — 25000003 PHARM REV CODE 250: Performed by: NURSE ANESTHETIST, CERTIFIED REGISTERED

## 2023-12-21 PROCEDURE — 37000009 HC ANESTHESIA EA ADD 15 MINS: Performed by: ORTHOPAEDIC SURGERY

## 2023-12-21 PROCEDURE — 27000510 HC BLANKET BAIR HUGGER ANY SIZE: Performed by: NURSE ANESTHETIST, CERTIFIED REGISTERED

## 2023-12-21 PROCEDURE — 64721 PR REVISE MEDIAN N/CARPAL TUNNEL SURG: ICD-10-PCS | Mod: RT,,, | Performed by: ORTHOPAEDIC SURGERY

## 2023-12-21 RX ORDER — DIPHENHYDRAMINE HYDROCHLORIDE 50 MG/ML
25 INJECTION INTRAMUSCULAR; INTRAVENOUS EVERY 6 HOURS PRN
Status: DISCONTINUED | OUTPATIENT
Start: 2023-12-21 | End: 2023-12-21 | Stop reason: HOSPADM

## 2023-12-21 RX ORDER — ONDANSETRON 2 MG/ML
INJECTION INTRAMUSCULAR; INTRAVENOUS
Status: DISCONTINUED | OUTPATIENT
Start: 2023-12-21 | End: 2023-12-21

## 2023-12-21 RX ORDER — OXYCODONE AND ACETAMINOPHEN 10; 325 MG/1; MG/1
1 TABLET ORAL EVERY 6 HOURS PRN
Qty: 28 TABLET | Refills: 0 | Status: SHIPPED | OUTPATIENT
Start: 2023-12-21

## 2023-12-21 RX ORDER — SODIUM CHLORIDE 9 MG/ML
INJECTION, SOLUTION INTRAVENOUS CONTINUOUS
Status: DISCONTINUED | OUTPATIENT
Start: 2023-12-21 | End: 2023-12-21 | Stop reason: HOSPADM

## 2023-12-21 RX ORDER — CEFAZOLIN SODIUM 1 G/3ML
INJECTION, POWDER, FOR SOLUTION INTRAMUSCULAR; INTRAVENOUS
Status: DISCONTINUED | OUTPATIENT
Start: 2023-12-21 | End: 2023-12-21

## 2023-12-21 RX ORDER — ONDANSETRON 4 MG/1
8 TABLET, ORALLY DISINTEGRATING ORAL EVERY 8 HOURS PRN
Status: DISCONTINUED | OUTPATIENT
Start: 2023-12-21 | End: 2023-12-21 | Stop reason: HOSPADM

## 2023-12-21 RX ORDER — DEXAMETHASONE SODIUM PHOSPHATE 4 MG/ML
INJECTION, SOLUTION INTRA-ARTICULAR; INTRALESIONAL; INTRAMUSCULAR; INTRAVENOUS; SOFT TISSUE
Status: DISCONTINUED | OUTPATIENT
Start: 2023-12-21 | End: 2023-12-21

## 2023-12-21 RX ORDER — PROPOFOL 10 MG/ML
VIAL (ML) INTRAVENOUS
Status: DISCONTINUED | OUTPATIENT
Start: 2023-12-21 | End: 2023-12-21

## 2023-12-21 RX ORDER — HYDROMORPHONE HYDROCHLORIDE 2 MG/ML
0.5 INJECTION, SOLUTION INTRAMUSCULAR; INTRAVENOUS; SUBCUTANEOUS EVERY 5 MIN PRN
Status: DISCONTINUED | OUTPATIENT
Start: 2023-12-21 | End: 2023-12-21 | Stop reason: HOSPADM

## 2023-12-21 RX ORDER — ONDANSETRON 2 MG/ML
4 INJECTION INTRAMUSCULAR; INTRAVENOUS DAILY PRN
Status: DISCONTINUED | OUTPATIENT
Start: 2023-12-21 | End: 2023-12-21 | Stop reason: HOSPADM

## 2023-12-21 RX ORDER — MEPERIDINE HYDROCHLORIDE 25 MG/ML
25 INJECTION INTRAMUSCULAR; INTRAVENOUS; SUBCUTANEOUS ONCE AS NEEDED
Status: DISCONTINUED | OUTPATIENT
Start: 2023-12-21 | End: 2023-12-21 | Stop reason: HOSPADM

## 2023-12-21 RX ORDER — FENTANYL CITRATE 50 UG/ML
INJECTION, SOLUTION INTRAMUSCULAR; INTRAVENOUS
Status: DISCONTINUED | OUTPATIENT
Start: 2023-12-21 | End: 2023-12-21

## 2023-12-21 RX ORDER — PROMETHAZINE HYDROCHLORIDE 25 MG/1
25 TABLET ORAL EVERY 6 HOURS PRN
Status: DISCONTINUED | OUTPATIENT
Start: 2023-12-21 | End: 2023-12-21 | Stop reason: HOSPADM

## 2023-12-21 RX ORDER — MORPHINE SULFATE 10 MG/ML
4 INJECTION INTRAMUSCULAR; INTRAVENOUS; SUBCUTANEOUS EVERY 5 MIN PRN
Status: DISCONTINUED | OUTPATIENT
Start: 2023-12-21 | End: 2023-12-21 | Stop reason: HOSPADM

## 2023-12-21 RX ORDER — IPRATROPIUM BROMIDE AND ALBUTEROL SULFATE 2.5; .5 MG/3ML; MG/3ML
3 SOLUTION RESPIRATORY (INHALATION) ONCE AS NEEDED
Status: DISCONTINUED | OUTPATIENT
Start: 2023-12-21 | End: 2023-12-21 | Stop reason: HOSPADM

## 2023-12-21 RX ORDER — LIDOCAINE HYDROCHLORIDE 20 MG/ML
INJECTION, SOLUTION EPIDURAL; INFILTRATION; INTRACAUDAL; PERINEURAL
Status: DISCONTINUED | OUTPATIENT
Start: 2023-12-21 | End: 2023-12-21

## 2023-12-21 RX ORDER — MIDAZOLAM HYDROCHLORIDE 1 MG/ML
INJECTION INTRAMUSCULAR; INTRAVENOUS
Status: DISCONTINUED | OUTPATIENT
Start: 2023-12-21 | End: 2023-12-21

## 2023-12-21 RX ORDER — HYDROCODONE BITARTRATE AND ACETAMINOPHEN 5; 325 MG/1; MG/1
1 TABLET ORAL EVERY 4 HOURS PRN
Status: DISCONTINUED | OUTPATIENT
Start: 2023-12-21 | End: 2023-12-21 | Stop reason: HOSPADM

## 2023-12-21 RX ORDER — ACETAMINOPHEN 500 MG
1000 TABLET ORAL EVERY 6 HOURS PRN
Status: DISCONTINUED | OUTPATIENT
Start: 2023-12-21 | End: 2023-12-21 | Stop reason: HOSPADM

## 2023-12-21 RX ORDER — HYDROCODONE BITARTRATE AND ACETAMINOPHEN 10; 325 MG/1; MG/1
1 TABLET ORAL EVERY 4 HOURS PRN
Status: DISCONTINUED | OUTPATIENT
Start: 2023-12-21 | End: 2023-12-21 | Stop reason: HOSPADM

## 2023-12-21 RX ADMIN — SODIUM CHLORIDE: 9 INJECTION, SOLUTION INTRAVENOUS at 03:12

## 2023-12-21 RX ADMIN — LIDOCAINE HYDROCHLORIDE 100 MG: 20 INJECTION, SOLUTION INTRAVENOUS at 03:12

## 2023-12-21 RX ADMIN — HYDROMORPHONE HYDROCHLORIDE 0.5 MG: 2 INJECTION INTRAMUSCULAR; INTRAVENOUS; SUBCUTANEOUS at 04:12

## 2023-12-21 RX ADMIN — CEFAZOLIN 2 G: 1 INJECTION, POWDER, FOR SOLUTION INTRAMUSCULAR; INTRAVENOUS; PARENTERAL at 03:12

## 2023-12-21 RX ADMIN — MIDAZOLAM HYDROCHLORIDE 2 MG: 1 INJECTION, SOLUTION INTRAMUSCULAR; INTRAVENOUS at 03:12

## 2023-12-21 RX ADMIN — ONDANSETRON 4 MG: 2 INJECTION INTRAMUSCULAR; INTRAVENOUS at 03:12

## 2023-12-21 RX ADMIN — DEXAMETHASONE SODIUM PHOSPHATE 10 MG: 4 INJECTION, SOLUTION INTRA-ARTICULAR; INTRALESIONAL; INTRAMUSCULAR; INTRAVENOUS; SOFT TISSUE at 03:12

## 2023-12-21 RX ADMIN — FENTANYL CITRATE 100 MCG: 50 INJECTION INTRAMUSCULAR; INTRAVENOUS at 03:12

## 2023-12-21 RX ADMIN — PROPOFOL 160 MG: 10 INJECTION, EMULSION INTRAVENOUS at 03:12

## 2023-12-21 NOTE — OR NURSING
1618-Pt rec'd to RR in stable condition, sedaterd, responds to stimulation, NADN, on 6L O2 via simple fm, resp even et unlabored,  SaO2-100%, IV fluids infusing, dressing C/D/I to right hand, cap refill < 3 secs, fingers are warm/dry to touch, pt unable to wiggle fingers, denies any c/o numbness/tingling at present, will con't to monitor, safety measures in effect.    1627-Pt c/o pain to right hand, pain rated 10, dilaudid 0.5 mg given, will titrate for pain control.    1630-Pt placed on room air, SaO2-100%.    1632-No pain relief, pain rated 10, dilaudid 0.5 mg given, will titrate for pain control.    1635-Arm sling and warming blanket placed on pt, will con't to monitor.    1650-Pt drowsy, NADN, pain improves to a 3, dressing remains C/D/I to right hand, orders to discharge to ASC room 19.    1655-Pt care released to Warner(RN), pt awake, vs hr-79, resp-13, Sao2-97% on room air, b/p 163/101.

## 2023-12-21 NOTE — H&P
[facility] - Orthopedic Periop Services  Orthopedics  H&P    Patient Name: Elsa Duran  MRN: [id number]  Admission Date: (Not on file)  Primary Care Provider: Karuna, Primary Doctor    Patient information was obtained from patient and past medical records.     Subjective:     Principal Problem:<principal problem not specified>    Chief Complaint: No chief complaint on file.       HPI: 33-year-old female with right carpal tunnel syndrome needing carpal tunnel release on the right  Right upper extremity she does move her fingers she has sensation in her hand but slightly decreased in median distribution she has palpable pulses the wrist full motion of the wrist positive Tinel's and Phalen's test in the right wrist she can oppose her thumb to little finger.  X-rays show no fracture subluxation    EMG show carpal tunnel syndrome on the right  Impression right carpal tunnel syndrome  Plan right carpal tunnel release    Past Medical History:   Diagnosis Date    History of gonorrhea     Hypertension     Migraine without aura, intractable, without status migrainosus     Noncompliance with medication regimen     Syncope        Past Surgical History:   Procedure Laterality Date    Abdominal Wall Mass Surgery  2019     SECTION  08/10/2008     SECTION  01/15/2013    DILATION AND CURETTAGE OF UTERUS  2012       Review of patient's allergies indicates:  No Known Allergies    No current facility-administered medications for this encounter.     Current Outpatient Medications   Medication Sig    amLODIPine (NORVASC) 10 MG tablet Take 1 tablet (10 mg total) by mouth once daily.    ergocalciferol (ERGOCALCIFEROL) 50,000 unit Cap Take 1 capsule (50,000 Units total) by mouth every 7 days. (Patient not taking: Reported on 2023)    ibuprofen (ADVIL,MOTRIN) 600 MG tablet 1 tablet with food or milk as needed Orally Three times a day for 30 days    TIFFANY-BE 0.35 mg tablet Take 1 tablet by mouth.     polyethylene glycol (GLYCOLAX) 17 gram PwPk Take 17 g by mouth once daily. (Patient not taking: Reported on 10/9/2023)    propranoloL (INDERAL) 40 MG tablet Take 1 tablet (40 mg total) by mouth 2 (two) times daily.     Family History       Problem Relation (Age of Onset)    Hypertension Mother, Father    No Known Problems Brother, Sister, Sister, Sister          Tobacco Use    Smoking status: Every Day     Current packs/day: 1.00     Average packs/day: 1 pack/day for 18.0 years (18.0 ttl pk-yrs)     Types: Cigarettes     Start date: 2006    Smokeless tobacco: Never   Substance and Sexual Activity    Alcohol use: Never    Drug use: Never    Sexual activity: Yes     Partners: Male     Review of Systems   Constitutional: Negative for decreased appetite.   HENT:  Negative for congestion and ear discharge.    Eyes:  Negative for blurred vision.   Cardiovascular:  Negative for chest pain and syncope.   Respiratory:  Negative for cough and wheezing.    Endocrine: Negative for cold intolerance and polyuria.   Hematologic/Lymphatic: Negative for adenopathy and bleeding problem.   Skin:  Negative for color change, nail changes and suspicious lesions.   Musculoskeletal:  Negative for muscle cramps and myalgias.   Gastrointestinal:  Negative for bloating and abdominal pain.   Genitourinary:  Negative for frequency and hematuria.   Neurological:  Positive for paresthesias. Negative for brief paralysis, sensory change and weakness.   Psychiatric/Behavioral:  Negative for altered mental status.    Allergic/Immunologic: Negative for hives.     Objective:     Vital Signs (Most Recent):    Vital Signs (24h Range):              There is no height or weight on file to calculate BMI.    No intake or output data in the 24 hours ending 12/21/23 1103                 Right Hand/Wrist Exam     Tests   Phalens sign: positive  Tinel's sign (median nerve): positive      Other     Neuorologic Exam    Median Distribution: abnormal  Ulnar  Distribution: normal  Radial Distribution: normal          Vascular Exam       Capillary Refill  Right Hand: normal capillary refill           Significant Labs: All pertinent labs within the past 24 hours have been reviewed.    Significant Imaging: I have reviewed all pertinent imaging results/findings.  Assessment/Plan:     No notes have been filed under this hospital service.  Service: Orthopedic Surgery      Sameer Kingsley MD  Orthopedics  [facility] - Orthopedic Periop Services

## 2023-12-21 NOTE — OP NOTE
[facility] - Orthopedic Periop Services  General Surgery  Operative Note    SUMMARY     Date of Procedure: 12/21/2023     Procedure: Procedure(s) (LRB):  RELEASE, CARPAL TUNNEL (Right)       Surgeon(s) and Role:     * Sameer Kingsley MD - Primary    Assisting Surgeon: None    Pre-Operative Diagnosis: Bilateral carpal tunnel syndrome [G56.03]    Post-Operative Diagnosis: Post-Op Diagnosis Codes:     * Bilateral carpal tunnel syndrome [G56.03]    Anesthesia: General    Operative Findings (including complications, if any):        OPERATIVE REPORT    SURGERY DATE: 12/21/2023    PRE-OP DIAGNOSIS: Bilateral carpal tunnel syndrome [G56.03]    POST-OP DIAGNOSIS:  Post-Op Diagnosis Codes:     * Bilateral carpal tunnel syndrome [G56.03]    PROCEDURE: Procedure(s) (LRB):  RELEASE, CARPAL TUNNEL (Right)    SURGEON:  Sameer Kingsley M.D.    ANESTHESIA: General    EBL:  5cc    TOURNIQUET TIME:  18 min    COMPLICATIONS:  None.    INDICATION:  Patient is a 33 y.o. year old female with right carpal tunnel syndrome needing carpal tunnel release.    PROCEDURE IN DETAIL:  After having the risks and benefits of the procedure explained at length to the patient and the patient stating that they understand the risks and benefits of the procedure and patient wishes to proceed with the procedure, a written informed consent was obtained.  The patient was taken to the Operating Room and placed in the supine position on the operative table at which time General was placed per Anesthesia.  At this point the tourniquet was placed over cast padding on the proximal right arm and the right upper extremity was then prepped and draped in sterile fashion.  It was elevated and exsanguinated with an Esmarch bandage and tourniquet inflated to 250 millimeters of mercury.    At this point marking just ulnar to the hypothenar crease in the right hand, approximately a 1 1/2 - 2 centimeter incision was made from the distal flexor crease of the wrist  distally.  This was made with a #15 blade through the skin.  At this point dissection was carried down using tenotomy scissors spreading through the subcutaneous tissue down to the transverse carpal ligament and a self-retaining retractor was placed and transverse carpal ligament was identified.  At this point, going as ulnar as possible, a nick was made in the transverse carpal ligament at the proximal end and [place] elevator was placed underneath the transverse carpal ligament.  It was maneuvered as far ulnarly as possible.  At this point using a #15 blade cutting down on top of the [place] elevator, the transverse carpal ligament was released.  At this point the nerve tendons were examined.  There was a large amount of tenosynovitis noted and a lot of tenosynovium around the medial nerve.  At this point neurolysis was performed removing the tenosynovitis around the medial nerve carefully with pickups and tenotomy scissors.  Once this had been done the carpal canal was then inspected.  The transverse carpal ligament was freed entirely as well as the investing fascia of the forearm.  A [place] elevator was used to confirm that all the bands were released both proximally and distally.  The tendon synovium had been debrided and the neurolysis performed.    At this point the wound was irrigated out with copious amount of normal saline.  Tourniquet was let down.  Hemostasis maintained with Bovie electrocautery.  The skin was closed with a running 4-0 Nylon running simple suture.  There was good capillary refill and palpable pulses.  A sterile occlusive dressing was placed followed by a hand splint on the volar surface.  The patient was awakened from the operative table and taken to the Recovery room in good condition.  All counts were correct.  There were no complications.           Description of Technical Procedures:     Significant Surgical Tasks Conducted by the Assistant(s), if Applicable:     Estimated Blood Loss  (EBL): * No values recorded between 12/21/2023  3:49 PM and 12/21/2023  4:09 PM *           Implants: * No implants in log *    Specimens:   Specimen (24h ago, onward)      None                    Condition: Good    Disposition: PACU - hemodynamically stable.    Attestation: I was present and scrubbed for the entire procedure.

## 2023-12-21 NOTE — TRANSFER OF CARE
"Anesthesia Transfer of Care Note    Patient: Elsa Duran    Procedure(s) Performed: Procedure(s) (LRB):  RELEASE, CARPAL TUNNEL (Right)    Patient location: PACU    Anesthesia Type: general    Transport from OR: Transported from OR on 6-10 L/min O2 by face mask with adequate spontaneous ventilation    Post pain: adequate analgesia    Post assessment: no apparent anesthetic complications    Post vital signs: stable    Level of consciousness: responds to stimulation    Nausea/Vomiting: no nausea/vomiting    Complications: none    Transfer of care protocol was followed      Last vitals: Visit Vitals  BP (!) 136/98   Pulse 99   Temp 36.4 °C (97.6 °F)   Resp (!) 22   Ht 5' 6" (1.676 m)   Wt 77.1 kg (170 lb)   SpO2 100%   Breastfeeding No   BMI 27.44 kg/m²     "

## 2023-12-21 NOTE — BRIEF OP NOTE
[facility] - Orthopedic Periop Services  Brief Operative Note    Surgery Date: 12/21/2023     Surgeon(s) and Role:     * Sameer Kingsley MD - Primary    Assisting Surgeon: None    Pre-op Diagnosis:  Bilateral carpal tunnel syndrome [G56.03]    Post-op Diagnosis:  Post-Op Diagnosis Codes:     * Bilateral carpal tunnel syndrome [G56.03]    Procedure(s) (LRB):  RELEASE, CARPAL TUNNEL (Right)    Anesthesia: General    Operative Findings:  Patient underwent a right carpal tunnel release without complication    Estimated Blood Loss: * No values recorded between 12/21/2023  3:49 PM and 12/21/2023  4:10 PM *         Specimens:   Specimen (24h ago, onward)      None              Discharge Note    OUTCOME: Patient tolerated treatment/procedure well without complication and is now ready for discharge.    DISPOSITION: Home or Self Care    FINAL DIAGNOSIS:  Bilateral carpal tunnel syndrome    FOLLOWUP: In clinic    DISCHARGE INSTRUCTIONS:    Discharge Procedure Orders   Diet general     Keep surgical extremity elevated     Ice to affected area   Order Comments: using barrier between ice and skin (specify duration&frequency)     Remove dressing in 72 hours   Order Comments: Keep dressing in place for 72 hours     Change dressing (specify)   Order Comments: Dressing change: one time per day beginning 72 hours post op.     Call MD for:  temperature >100.4     Call MD for:  persistent nausea and vomiting     Call MD for:  severe uncontrolled pain     Call MD for:  difficulty breathing, headache or visual disturbances     Call MD for:  redness, tenderness, or signs of infection (pain, swelling, redness, odor or green/yellow discharge around incision site)     Call MD for:  hives     Call MD for:  persistent dizziness or light-headedness     Call MD for:  extreme fatigue     Activity as tolerated     Shower on day dressing removed (No bath)     Weight bearing as tolerated

## 2023-12-21 NOTE — HPI
33-year-old female with right carpal tunnel syndrome needing carpal tunnel release on the right  Right upper extremity she does move her fingers she has sensation in her hand but slightly decreased in median distribution she has palpable pulses the wrist full motion of the wrist positive Tinel's and Phalen's test in the right wrist she can oppose her thumb to little finger.  X-rays show no fracture subluxation    EMG show carpal tunnel syndrome on the right  Impression right carpal tunnel syndrome  Plan right carpal tunnel release

## 2023-12-21 NOTE — ANESTHESIA PREPROCEDURE EVALUATION
12/21/2023  Elsa Duran is a 33 y.o., female.      Pre-op Assessment    I have reviewed the Patient Summary Reports.     I have reviewed the Nursing Notes. I have reviewed the NPO Status.   I have reviewed the Medications.     Review of Systems  Anesthesia Hx:  No problems with previous Anesthesia             Denies Family Hx of Anesthesia complications.    Denies Personal Hx of Anesthesia complications.                    Social:  Smoker, No Alcohol Use       Hematology/Oncology:       -- Anemia:                                  Cardiovascular:     Hypertension                                        Musculoskeletal:  Arthritis   Right CTS            Neurological:    Neuromuscular Disease,  Headaches                                     Physical Exam  General: Well nourished, Cooperative and Alert    Airway:  Mallampati: II   Mouth Opening: Normal  TM Distance: Normal  Tongue: Normal  Neck ROM: Normal ROM    Chest/Lungs:  Clear to auscultation, Normal Respiratory Rate    Heart:  Rate: Normal  Rhythm: Regular Rhythm        Chemistry        Component Value Date/Time     12/20/2023 1611    K 4.0 12/20/2023 1611     (H) 12/20/2023 1611    CO2 29 12/20/2023 1611    BUN 11 12/20/2023 1611    CREATININE 0.82 12/20/2023 1611     12/20/2023 1611        Component Value Date/Time    CALCIUM 8.8 12/20/2023 1611    ALKPHOS 108 (H) 06/17/2022 0744    AST 21 06/17/2022 0744    ALT 37 06/17/2022 0744    BILITOT 0.3 06/17/2022 0744    ESTGFRAFRICA 132 11/30/2021 0400    EGFRNONAA 104 06/17/2022 0744        Lab Results   Component Value Date    WBC 4.41 (L) 12/20/2023    HGB 10.4 (L) 12/20/2023    HCT 33.3 (L) 12/20/2023     12/20/2023     Results for orders placed or performed during the hospital encounter of 06/27/21   EKG 12-lead    Collection Time: 06/27/21 10:36 PM    Narrative    Test Reason  : R07.9,    Vent. Rate : 059 BPM     Atrial Rate : 059 BPM     P-R Int : 158 ms          QRS Dur : 098 ms      QT Int : 392 ms       P-R-T Axes : 020 024 026 degrees     QTc Int : 388 ms    Sinus bradycardia  Otherwise normal ECG  No previous ECGs available  Confirmed by Kia Cook MD ([id number]) on 7/11/2021 7:50:15 PM    Referred By: [email or web address]   SELF           Confirmed By:Kia Cook MD         Anesthesia Plan  Type of Anesthesia, risks & benefits discussed:    Anesthesia Type: Gen Supraglottic Airway  Intra-op Monitoring Plan: Standard ASA Monitors  Post Op Pain Control Plan: multimodal analgesia  Induction:  IV  Airway Plan: Direct, Post-Induction  Informed Consent: Informed consent signed with the Patient and all parties understand the risks and agree with anesthesia plan.  All questions answered.   ASA Score: 2  Day of Surgery Review of History & Physical: H&P Update referred to the surgeon/provider.I have interviewed and examined the patient. I have reviewed the patient's H&P dated: There are no significant changes.     Ready For Surgery From Anesthesia Perspective.     .

## 2023-12-21 NOTE — ANESTHESIA PROCEDURE NOTES
Intubation    Date/Time: 12/21/2023 3:33 PM    Performed by: Ruben Madrigal CRNA  Authorized by: Ruben Madrigal CRNA    Intubation:     Induction:  Intravenous    Intubated:  Postinduction    Mask Ventilation:  Easy mask    Attempts:  1    Attempted By:  CRNA    Difficult Airway Encountered?: No      Complications:  None    Airway Device:  Supraglottic airway/LMA    Airway Device Size:  4.0    Style/Cuff Inflation:  Cuffed (inflated to minimal occlusive pressure)    Placement Verified By:  Capnometry    Complicating Factors:  None    Findings Post-Intubation:  BS equal bilateral

## 2023-12-21 NOTE — SUBJECTIVE & OBJECTIVE
Past Medical History:   Diagnosis Date    History of gonorrhea     Hypertension     Migraine without aura, intractable, without status migrainosus     Noncompliance with medication regimen     Syncope        Past Surgical History:   Procedure Laterality Date    Abdominal Wall Mass Surgery  2019     SECTION  08/10/2008     SECTION  01/15/2013    DILATION AND CURETTAGE OF UTERUS  2012       Review of patient's allergies indicates:  No Known Allergies    No current facility-administered medications for this encounter.     Current Outpatient Medications   Medication Sig    amLODIPine (NORVASC) 10 MG tablet Take 1 tablet (10 mg total) by mouth once daily.    ergocalciferol (ERGOCALCIFEROL) 50,000 unit Cap Take 1 capsule (50,000 Units total) by mouth every 7 days. (Patient not taking: Reported on 2023)    ibuprofen (ADVIL,MOTRIN) 600 MG tablet 1 tablet with food or milk as needed Orally Three times a day for 30 days    TIFFANY-BE 0.35 mg tablet Take 1 tablet by mouth.    polyethylene glycol (GLYCOLAX) 17 gram PwPk Take 17 g by mouth once daily. (Patient not taking: Reported on 10/9/2023)    propranoloL (INDERAL) 40 MG tablet Take 1 tablet (40 mg total) by mouth 2 (two) times daily.     Family History       Problem Relation (Age of Onset)    Hypertension Mother, Father    No Known Problems Brother, Sister, Sister, Sister          Tobacco Use    Smoking status: Every Day     Current packs/day: 1.00     Average packs/day: 1 pack/day for 18.0 years (18.0 ttl pk-yrs)     Types: Cigarettes     Start date:     Smokeless tobacco: Never   Substance and Sexual Activity    Alcohol use: Never    Drug use: Never    Sexual activity: Yes     Partners: Male     Review of Systems   Constitutional: Negative for decreased appetite.   HENT:  Negative for congestion and ear discharge.    Eyes:  Negative for blurred vision.   Cardiovascular:  Negative for chest pain and syncope.   Respiratory:  Negative for  cough and wheezing.    Endocrine: Negative for cold intolerance and polyuria.   Hematologic/Lymphatic: Negative for adenopathy and bleeding problem.   Skin:  Negative for color change, nail changes and suspicious lesions.   Musculoskeletal:  Negative for muscle cramps and myalgias.   Gastrointestinal:  Negative for bloating and abdominal pain.   Genitourinary:  Negative for frequency and hematuria.   Neurological:  Positive for paresthesias. Negative for brief paralysis, sensory change and weakness.   Psychiatric/Behavioral:  Negative for altered mental status.    Allergic/Immunologic: Negative for hives.     Objective:     Vital Signs (Most Recent):    Vital Signs (24h Range):              There is no height or weight on file to calculate BMI.    No intake or output data in the 24 hours ending 12/21/23 1103                 Right Hand/Wrist Exam     Tests   Phalens sign: positive  Tinel's sign (median nerve): positive      Other     Neuorologic Exam    Median Distribution: abnormal  Ulnar Distribution: normal  Radial Distribution: normal          Vascular Exam       Capillary Refill  Right Hand: normal capillary refill           Significant Labs: All pertinent labs within the past 24 hours have been reviewed.    Significant Imaging: I have reviewed all pertinent imaging results/findings.

## 2023-12-22 NOTE — ANESTHESIA POSTPROCEDURE EVALUATION
Anesthesia Post Evaluation    Patient: Elsa Duran    Procedure(s) Performed: Procedure(s) (LRB):  RELEASE, CARPAL TUNNEL (Right)    Final Anesthesia Type: general      Patient location during evaluation: PACU  Patient participation: Yes- Able to Participate  Level of consciousness: awake and alert  Post-procedure vital signs: reviewed and stable  Pain management: adequate  Airway patency: patent  MAIKEL mitigation strategies: Multimodal analgesia  PONV status at discharge: No PONV  Anesthetic complications: no      Cardiovascular status: blood pressure returned to baseline  Respiratory status: unassisted  Hydration status: euvolemic  Follow-up not needed.              Vitals Value Taken Time   /101 12/21/23 1655   Temp 36.4 °C (97.6 °F) 12/21/23 1624   Pulse 72 12/21/23 1655   Resp 16 12/21/23 1655   SpO2 99 % 12/21/23 1655         Event Time   Out of Recovery 16:50:00         Pain/Christine Score: Pain Rating Prior to Med Admin: 10 (12/21/2023  4:32 PM)  Pain Rating Post Med Admin: 3 (12/21/2023  4:50 PM)  Christine Score: 10 (12/21/2023  5:03 PM)  Modified Christine Score: 20 (12/21/2023  5:39 PM)

## 2024-01-05 ENCOUNTER — TELEPHONE (OUTPATIENT)
Dept: ORTHOPEDICS | Facility: CLINIC | Age: 34
End: 2024-01-05
Payer: MEDICAID

## 2024-01-05 NOTE — TELEPHONE ENCOUNTER
----- Message from Aparna Allen sent at 1/5/2024  8:29 AM CST -----  Needing to reschedule her appt With Dr Kingsley  today to next wed. Call back # [phone]

## 2024-01-10 ENCOUNTER — OFFICE VISIT (OUTPATIENT)
Dept: ORTHOPEDICS | Facility: CLINIC | Age: 34
End: 2024-01-10
Payer: MEDICAID

## 2024-01-10 DIAGNOSIS — Z98.890 STATUS POST CARPAL TUNNEL RELEASE: Primary | ICD-10-CM

## 2024-01-10 PROCEDURE — 99024 POSTOP FOLLOW-UP VISIT: CPT | Mod: ,,,

## 2024-01-10 PROCEDURE — 99213 OFFICE O/P EST LOW 20 MIN: CPT | Mod: PBBFAC

## 2024-01-10 PROCEDURE — 1159F MED LIST DOCD IN RCRD: CPT | Mod: CPTII,,,

## 2024-01-10 NOTE — PATIENT INSTRUCTIONS
Patient is status-post right carpal tunnel release 2 weeks.  Patient is doing well with his postop visit.  All stitches were removed and replaced with Steri-Strips today.  Keep the incision dry, ok to wash with soap and water in 2 days, let Steri-Strips fall off on their own.  Okay to discontinue splint wearing.  We will keep patient off work until follow-up visit with Dr. 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  Follow up with Dr. Kingsley in 3 weeks, sooner if needed.

## 2024-01-10 NOTE — PROGRESS NOTES
Release, Carpal Tunnel - Right 2023    Elsa Duran is a 33 y.o. female seen today for post-op visit.  Patient is status post right carpal tunnel release on 23.  Patient is doing well.  She reports intermittent pain traveling up her forearm.  She denies any tingling going or numbness in her wrist or forearm.  No other complaints today.        PAST MEDICAL HISTORY:   Past Medical History:   Diagnosis Date    History of gonorrhea     Hypertension     Migraine without aura, intractable, without status migrainosus     Noncompliance with medication regimen     Syncope         PAST SURGICAL HISTORY:   Past Surgical History:   Procedure Laterality Date    Abdominal Wall Mass Surgery  2019    CARPAL TUNNEL RELEASE Right 2023    Procedure: RELEASE, CARPAL TUNNEL;  Surgeon: Sameer Kingsley MD;  Location: [facility];  Service: Orthopedics;  Laterality: Right;     SECTION  08/10/2008     SECTION  01/15/2013    DILATION AND CURETTAGE OF UTERUS  2012        MEDICATIONS:   Current Outpatient Medications:     amLODIPine (NORVASC) 10 MG tablet, Take 1 tablet (10 mg total) by mouth once daily., Disp: 90 tablet, Rfl: 1    ergocalciferol (ERGOCALCIFEROL) 50,000 unit Cap, Take 1 capsule (50,000 Units total) by mouth every 7 days. (Patient not taking: Reported on 2023), Disp: 4 capsule, Rfl: 2    ibuprofen (ADVIL,MOTRIN) 600 MG tablet, 1 tablet with food or milk as needed Orally Three times a day for 30 days, Disp: , Rfl:     TIFFANY-BE 0.35 mg tablet, Take 1 tablet by mouth., Disp: , Rfl:     oxyCODONE-acetaminophen (PERCOCET)  mg per tablet, Take 1 tablet by mouth every 6 (six) hours as needed for Pain., Disp: 28 tablet, Rfl: 0    polyethylene glycol (GLYCOLAX) 17 gram PwPk, Take 17 g by mouth once daily. (Patient not taking: Reported on 10/9/2023), Disp: 30 each, Rfl: 1    propranoloL (INDERAL) 40 MG tablet, Take 1 tablet (40 mg total) by mouth 2 (two)  times daily., Disp: 60 tablet, Rfl: 11       PHYSICAL EXAM:      GENERAL: Well-developed, well-nourished female . The patient is alert, oriented and cooperative.    EXTREMITIES:  Right wrist with incision clean dry and intact no signs of infection, able to pronate and supinate without pain minimal tenderness to palpation able to flex and extend the wrist with minimal pain    WOUND: Incisions are clean,dry,intact without signs of infection and healing well      RADIOGRAPHIC FINDINGS:   No results found.     Patient Active Problem List    Diagnosis Date Noted    Vitamin D deficiency 12/04/2023    Bilateral carpal tunnel syndrome 11/13/2023    Osteoarthritis of right shoulder 10/03/2023    Right shoulder pain 08/28/2023    Syncope 08/09/2023    Need for hepatitis C screening test 08/09/2023    Abdominal pain 08/09/2023    Cervical radiculopathy 08/07/2023    Preexisting hypertension complicating pregnancy, antepartum, third trimester 06/17/2022    Chronic hypertension with superimposed preeclampsia 06/17/2022    Chronic migraine without aura without status migrainosus, not intractable 09/02/2021    Hypertension 09/02/2021    Non compliance w medication regimen 09/02/2021    Nonintractable headache 09/02/2021     IMPRESSION AND PLAN: Patient is status-post right carpal tunnel release 2 weeks.  Patient is doing well with his postop visit.  All stitches were removed and replaced with Steri-Strips today.  Keep the incision dry, ok to wash with soap and water in 2 days, let Steri-Strips fall off on their own.  Okay to discontinue splint wearing.  We will keep patient off work until follow-up visit with Dr. 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  Follow up with Dr. Kingsley in 3 weeks, sooner if needed.      No follow-ups on file.       Amairani Calvillo PA-C      (Subject to voice recognition error, transcription service not allowed)

## 2024-01-10 NOTE — LETTER
January 10, 2024      [facility]  [address]  [place] [address]  Phone: [phone]  Fax: [phone]       Patient: Elsa Duran   YOB: 1990  Date of Visit: 01/10/2024    To Whom It May Concern:    OLI Duran  was at [facility] on 01/10/2024. The patient may remain off work until evaluated in Dr Kingsley's office. Her next appointment with him is on 1-31-24. If you have any questions or concerns, or if I can be of further assistance, please do not hesitate to contact me.    Sincerely,    KRIS De Paz RN

## 2024-01-31 ENCOUNTER — OFFICE VISIT (OUTPATIENT)
Dept: ORTHOPEDICS | Facility: CLINIC | Age: 34
End: 2024-01-31
Payer: MEDICAID

## 2024-01-31 VITALS — HEIGHT: 66 IN | WEIGHT: 170 LBS | BODY MASS INDEX: 27.32 KG/M2

## 2024-01-31 DIAGNOSIS — Z98.890 STATUS POST CARPAL TUNNEL RELEASE: Primary | ICD-10-CM

## 2024-01-31 PROCEDURE — 1159F MED LIST DOCD IN RCRD: CPT | Mod: CPTII,,, | Performed by: ORTHOPAEDIC SURGERY

## 2024-01-31 PROCEDURE — 99024 POSTOP FOLLOW-UP VISIT: CPT | Mod: ,,, | Performed by: ORTHOPAEDIC SURGERY

## 2024-01-31 PROCEDURE — 99213 OFFICE O/P EST LOW 20 MIN: CPT | Mod: PBBFAC | Performed by: ORTHOPAEDIC SURGERY

## 2024-01-31 NOTE — PROGRESS NOTES
Patient is here for follow-up of right carpal tunnel release she is doing well.  Wounds well healed.  Saint tingling in his much improved.  She can oppose her thumb to little finger.  At this time I will let her use her hand as tolerates.  She has full motion of the fingers.  I will follow back up on a p.r.n. basis.  Her left hand she has carpal tunnel on the mg but she has not having symptoms at this time.  I will follow up as needed

## 2024-05-13 ENCOUNTER — OFFICE VISIT (OUTPATIENT)
Dept: FAMILY MEDICINE | Facility: CLINIC | Age: 34
End: 2024-05-13
Payer: MEDICAID

## 2024-05-13 VITALS
HEIGHT: 66 IN | OXYGEN SATURATION: 98 % | WEIGHT: 208 LBS | TEMPERATURE: 99 F | SYSTOLIC BLOOD PRESSURE: 150 MMHG | BODY MASS INDEX: 33.43 KG/M2 | HEART RATE: 101 BPM | DIASTOLIC BLOOD PRESSURE: 117 MMHG | RESPIRATION RATE: 18 BRPM

## 2024-05-13 DIAGNOSIS — R51.9 NONINTRACTABLE HEADACHE, UNSPECIFIED CHRONICITY PATTERN, UNSPECIFIED HEADACHE TYPE: Primary | ICD-10-CM

## 2024-05-13 DIAGNOSIS — Z12.4 PAP SMEAR FOR CERVICAL CANCER SCREENING: ICD-10-CM

## 2024-05-13 DIAGNOSIS — N39.0 URINARY TRACT INFECTION WITHOUT HEMATURIA, SITE UNSPECIFIED: ICD-10-CM

## 2024-05-13 DIAGNOSIS — E66.9 CLASS 1 OBESITY WITH BODY MASS INDEX (BMI) OF 33.0 TO 33.9 IN ADULT, UNSPECIFIED OBESITY TYPE, UNSPECIFIED WHETHER SERIOUS COMORBIDITY PRESENT: ICD-10-CM

## 2024-05-13 LAB
BILIRUB UR QL STRIP: NEGATIVE
CLARITY UR: CLEAR
COLOR UR: YELLOW
GLUCOSE UR STRIP-MCNC: NORMAL MG/DL
KETONES UR STRIP-SCNC: NEGATIVE MG/DL
LEUKOCYTE ESTERASE UR QL STRIP: NEGATIVE
NITRITE UR QL STRIP: NEGATIVE
PH UR STRIP: 7 PH UNITS
PROT UR QL STRIP: 10
RBC # UR STRIP: NEGATIVE /UL
SP GR UR STRIP: 1.02
UROBILINOGEN UR STRIP-ACNC: NORMAL MG/DL

## 2024-05-13 PROCEDURE — 3008F BODY MASS INDEX DOCD: CPT | Mod: CPTII,,, | Performed by: INTERNAL MEDICINE

## 2024-05-13 PROCEDURE — 3077F SYST BP >= 140 MM HG: CPT | Mod: CPTII,,, | Performed by: INTERNAL MEDICINE

## 2024-05-13 PROCEDURE — 81003 URINALYSIS AUTO W/O SCOPE: CPT | Mod: QW,,, | Performed by: CLINICAL MEDICAL LABORATORY

## 2024-05-13 PROCEDURE — 1159F MED LIST DOCD IN RCRD: CPT | Mod: CPTII,,, | Performed by: INTERNAL MEDICINE

## 2024-05-13 PROCEDURE — 3080F DIAST BP >= 90 MM HG: CPT | Mod: CPTII,,, | Performed by: INTERNAL MEDICINE

## 2024-05-13 PROCEDURE — 99214 OFFICE O/P EST MOD 30 MIN: CPT | Mod: ,,, | Performed by: INTERNAL MEDICINE

## 2024-05-13 RX ORDER — TOPIRAMATE 100 MG/1
100 TABLET, FILM COATED ORAL 2 TIMES DAILY
Qty: 120 TABLET | Refills: 2 | Status: SHIPPED | OUTPATIENT
Start: 2024-05-13

## 2024-05-13 RX ORDER — NITROFURANTOIN 25; 75 MG/1; MG/1
100 CAPSULE ORAL 2 TIMES DAILY
Qty: 10 CAPSULE | Refills: 0 | Status: SHIPPED | OUTPATIENT
Start: 2024-05-13

## 2024-05-14 PROBLEM — E66.811 CLASS 1 OBESITY WITH BODY MASS INDEX (BMI) OF 33.0 TO 33.9 IN ADULT: Status: ACTIVE | Noted: 2024-05-14

## 2024-05-14 PROBLEM — Z12.4 PAP SMEAR FOR CERVICAL CANCER SCREENING: Status: ACTIVE | Noted: 2023-08-09

## 2024-05-14 PROBLEM — N39.0 URINARY TRACT INFECTION WITHOUT HEMATURIA: Status: ACTIVE | Noted: 2024-05-14

## 2024-05-14 PROBLEM — E66.9 CLASS 1 OBESITY WITH BODY MASS INDEX (BMI) OF 33.0 TO 33.9 IN ADULT: Status: ACTIVE | Noted: 2024-05-14

## 2024-05-14 NOTE — PROGRESS NOTES
"Subjective:       Patient ID: Elsa Duran is a 33 y.o. female.    Chief Complaint: Headache, Constipation, and Bladder Pain    HPI  .  Patient presents with a moderate headache.  Patient has chronic obesity and also patient is complaining of dysuria in urinary frequency today.    Current Medications:    Current Outpatient Medications:     amLODIPine (NORVASC) 10 MG tablet, Take 1 tablet (10 mg total) by mouth once daily., Disp: 90 tablet, Rfl: 1    TIFFANY-BE 0.35 mg tablet, Take 1 tablet by mouth., Disp: , Rfl:     oxyCODONE-acetaminophen (PERCOCET)  mg per tablet, Take 1 tablet by mouth every 6 (six) hours as needed for Pain., Disp: 28 tablet, Rfl: 0    propranoloL (INDERAL) 40 MG tablet, Take 1 tablet (40 mg total) by mouth 2 (two) times daily., Disp: 60 tablet, Rfl: 11    ergocalciferol (ERGOCALCIFEROL) 50,000 unit Cap, Take 1 capsule (50,000 Units total) by mouth every 7 days. (Patient not taking: Reported on 12/4/2023), Disp: 4 capsule, Rfl: 2    nitrofurantoin, macrocrystal-monohydrate, (MACROBID) 100 MG capsule, Take 1 capsule (100 mg total) by mouth 2 (two) times daily., Disp: 10 capsule, Rfl: 0    polyethylene glycol (GLYCOLAX) 17 gram PwPk, Take 17 g by mouth once daily. (Patient not taking: Reported on 10/9/2023), Disp: 30 each, Rfl: 1    topiramate (TOPAMAX) 100 MG tablet, Take 1 tablet (100 mg total) by mouth 2 (two) times daily., Disp: 120 tablet, Rfl: 2           ROS  Twelve point system reviewed, unremarkable except for stated above in HPI.        Objective:         Vitals:    05/13/24 1338 05/13/24 1402   BP: (!) 142/100 (!) 150/117   BP Location: Left arm    Patient Position: Sitting    BP Method: Large (Automatic)    Pulse: 101    Resp: 18    Temp: 99 °F (37.2 °C)    TempSrc: Temporal    SpO2: 98%    Weight: 94.3 kg (208 lb)    Height: 5' 6" (1.676 m)         Physical Exam     Patient is awake alert oriented person place and  Lungs are clear to auscultation bilaterally no crackles or " wheezes   Cardiovascular S1-S2 regular rate and rhythm no murmurs rubs or gallops   Abdomen is soft positive bowel sounds nontender, extremities no clubbing cyanosis edema  Neuro no focal neurological deficits  Skin warm and dry.     Last Labs:     Office Visit on 05/13/2024   Component Date Value    Color, UA 05/13/2024 Yellow     Clarity, UA 05/13/2024 Clear     pH, UA 05/13/2024 7.0     Leukocytes, UA 05/13/2024 Negative     Nitrites, UA 05/13/2024 Negative     Protein, UA 05/13/2024 10 (A)     Glucose, UA 05/13/2024 Normal     Ketones, UA 05/13/2024 Negative     Urobilinogen, UA 05/13/2024 Normal     Bilirubin, UA 05/13/2024 Negative     Blood, UA 05/13/2024 Negative     Specific Gravity, UA 05/13/2024 1.025        Last Imaging:  X-ray Shoulder 2 or More Views Right  Narrative: EXAMINATION:  XR SHOULDER COMPLETE 2 OR MORE VIEWS RIGHT    CLINICAL HISTORY:  Pain in right shoulder    COMPARISON:  Right shoulder x-ray July 27, 2023    TECHNIQUE:  Frontal views of the right shoulder were obtained in internal and external rotation .    FINDINGS:  No convincing acute fracture or dislocation demonstrated. No concerning radiopaque foreign body visualized.  Impression: As above.    Point of Service: [facility]    Electronically signed by: Robert Parham  Date:    08/24/2023  Time:    11:16         **Labs and x-rays personally reviewed by me    ** reviewed           Assessment & Plan:       1. Nonintractable headache, unspecified chronicity pattern, unspecified headache type  -     Ambulatory referral/consult to Neurology; Future; Expected date: 05/20/2024    2. Urinary tract infection without hematuria, site unspecified  -     Urinalysis, Reflex to Urine Culture    3. Pap smear for cervical cancer screening  -     Ambulatory referral/consult to Obstetrics / Gynecology; Future; Expected date: 05/20/2024    4. Class 1 obesity with body mass index (BMI) of 33.0 to 33.9 in adult, unspecified obesity type,  unspecified whether serious comorbidity present    Other orders  -     topiramate (TOPAMAX) 100 MG tablet; Take 1 tablet (100 mg total) by mouth 2 (two) times daily.  Dispense: 120 tablet; Refill: 2  -     nitrofurantoin, macrocrystal-monohydrate, (MACROBID) 100 MG capsule; Take 1 capsule (100 mg total) by mouth 2 (two) times daily.  Dispense: 10 capsule; Refill: 0            Brett Dias MD

## 2024-05-18 ENCOUNTER — HOSPITAL ENCOUNTER (EMERGENCY)
Facility: HOSPITAL | Age: 34
Discharge: HOME | End: 2024-05-18
Attending: FAMILY MEDICINE
Payer: MEDICAID

## 2024-05-18 VITALS
TEMPERATURE: 99 F | BODY MASS INDEX: 33.27 KG/M2 | HEART RATE: 87 BPM | HEIGHT: 66 IN | OXYGEN SATURATION: 100 % | RESPIRATION RATE: 16 BRPM | DIASTOLIC BLOOD PRESSURE: 81 MMHG | SYSTOLIC BLOOD PRESSURE: 125 MMHG | WEIGHT: 207 LBS

## 2024-05-18 DIAGNOSIS — K59.00 CONSTIPATION: ICD-10-CM

## 2024-05-18 DIAGNOSIS — K59.00 CONSTIPATION, UNSPECIFIED CONSTIPATION TYPE: Primary | ICD-10-CM

## 2024-05-18 LAB
ALBUMIN SERPL BCP-MCNC: 3.7 G/DL (ref 3.5–5)
ALBUMIN/GLOB SERPL: 0.9 {RATIO}
ALP SERPL-CCNC: 65 U/L (ref 37–98)
ALT SERPL W P-5'-P-CCNC: 15 U/L (ref 13–56)
ANION GAP SERPL CALCULATED.3IONS-SCNC: 8 MMOL/L (ref 7–16)
AST SERPL W P-5'-P-CCNC: 14 U/L (ref 15–37)
B-HCG UR QL: NEGATIVE
BASOPHILS # BLD AUTO: 0.04 K/UL (ref 0–0.2)
BASOPHILS NFR BLD AUTO: 0.6 % (ref 0–1)
BILIRUB SERPL-MCNC: 0.3 MG/DL (ref ?–1.2)
BILIRUB UR QL STRIP: NEGATIVE
BUN SERPL-MCNC: 15 MG/DL (ref 7–18)
BUN/CREAT SERPL: 16 (ref 6–20)
CALCIUM SERPL-MCNC: 9.4 MG/DL (ref 8.5–10.1)
CHLORIDE SERPL-SCNC: 113 MMOL/L (ref 98–107)
CLARITY UR: CLEAR
CO2 SERPL-SCNC: 21 MMOL/L (ref 21–32)
COLOR UR: NORMAL
CREAT SERPL-MCNC: 0.92 MG/DL (ref 0.55–1.02)
CTP QC/QA: YES
DIFFERENTIAL METHOD BLD: ABNORMAL
EGFR (NO RACE VARIABLE) (RUSH/TITUS): 84 ML/MIN/1.73M2
EOSINOPHIL # BLD AUTO: 0.06 K/UL (ref 0–0.5)
EOSINOPHIL NFR BLD AUTO: 0.9 % (ref 1–4)
ERYTHROCYTE [DISTWIDTH] IN BLOOD BY AUTOMATED COUNT: 15.3 % (ref 11.5–14.5)
GLOBULIN SER-MCNC: 4.1 G/DL (ref 2–4)
GLUCOSE SERPL-MCNC: 87 MG/DL (ref 74–106)
GLUCOSE UR STRIP-MCNC: NORMAL MG/DL
HCT VFR BLD AUTO: 33.7 % (ref 38–47)
HGB BLD-MCNC: 10.3 G/DL (ref 12–16)
IMM GRANULOCYTES # BLD AUTO: 0.02 K/UL (ref 0–0.04)
IMM GRANULOCYTES NFR BLD: 0.3 % (ref 0–0.4)
KETONES UR STRIP-SCNC: NEGATIVE MG/DL
LEUKOCYTE ESTERASE UR QL STRIP: NEGATIVE
LIPASE SERPL-CCNC: 44 U/L (ref 16–77)
LYMPHOCYTES # BLD AUTO: 2.41 K/UL (ref 1–4.8)
LYMPHOCYTES NFR BLD AUTO: 36.6 % (ref 27–41)
MCH RBC QN AUTO: 25.9 PG (ref 27–31)
MCHC RBC AUTO-ENTMCNC: 30.6 G/DL (ref 32–36)
MCV RBC AUTO: 84.7 FL (ref 80–96)
MONOCYTES # BLD AUTO: 0.46 K/UL (ref 0–0.8)
MONOCYTES NFR BLD AUTO: 7 % (ref 2–6)
MPC BLD CALC-MCNC: 9.8 FL (ref 9.4–12.4)
NEUTROPHILS # BLD AUTO: 3.59 K/UL (ref 1.8–7.7)
NEUTROPHILS NFR BLD AUTO: 54.6 % (ref 53–65)
NITRITE UR QL STRIP: NEGATIVE
NRBC # BLD AUTO: 0 X10E3/UL
NRBC, AUTO (.00): 0 %
PH UR STRIP: 7.5 PH UNITS
PLATELET # BLD AUTO: 330 K/UL (ref 150–400)
POTASSIUM SERPL-SCNC: 4 MMOL/L (ref 3.5–5.1)
PROT SERPL-MCNC: 7.8 G/DL (ref 6.4–8.2)
PROT UR QL STRIP: NEGATIVE
RBC # BLD AUTO: 3.98 M/UL (ref 4.2–5.4)
RBC # UR STRIP: NEGATIVE /UL
SODIUM SERPL-SCNC: 138 MMOL/L (ref 136–145)
SP GR UR STRIP: 1.02
UROBILINOGEN UR STRIP-ACNC: NORMAL MG/DL
WBC # BLD AUTO: 6.58 K/UL (ref 4.5–11)

## 2024-05-18 PROCEDURE — 81003 URINALYSIS AUTO W/O SCOPE: CPT

## 2024-05-18 PROCEDURE — 36415 COLL VENOUS BLD VENIPUNCTURE: CPT

## 2024-05-18 PROCEDURE — 85025 COMPLETE CBC W/AUTO DIFF WBC: CPT

## 2024-05-18 PROCEDURE — 83690 ASSAY OF LIPASE: CPT

## 2024-05-18 PROCEDURE — 80053 COMPREHEN METABOLIC PANEL: CPT

## 2024-05-18 PROCEDURE — 99284 EMERGENCY DEPT VISIT MOD MDM: CPT | Mod: 25

## 2024-05-18 PROCEDURE — 81025 URINE PREGNANCY TEST: CPT

## 2024-05-18 RX ORDER — BISACODYL 5 MG
10 TABLET, DELAYED RELEASE (ENTERIC COATED) ORAL 2 TIMES DAILY
Qty: 14 TABLET | Refills: 0 | Status: CANCELLED | OUTPATIENT
Start: 2024-05-18

## 2024-05-18 NOTE — ED PROVIDER NOTES
"Encounter Date: 2024       History     Chief Complaint   Patient presents with    Vomiting    Abdominal Pain     Pt presents to ed with c/o having vomiting, constipation and abdominal pain for 2 weeks     Pt is a 32 yo female who presents with a cc of "constipation and vomiting." States that symptoms have been ongoing for approximately 1 week. She reports only 2 episodes of vomiting during that time. She states that she is have bowel movements with the most recent being earlier today, she describes as "hard balls of poop". She has tried OTC stool softeners but they havent helped much. Denies any recent changes in diet or medications. Pt does report intermittent nausea that occurs both before and after meals but not with every meal. Lastly patient endorses urinary frequency but no dysuria.       Review of patient's allergies indicates:  No Known Allergies  Past Medical History:   Diagnosis Date    History of gonorrhea     Hypertension     Migraine without aura, intractable, without status migrainosus     Noncompliance with medication regimen     Syncope      Past Surgical History:   Procedure Laterality Date    Abdominal Wall Mass Surgery  2019    CARPAL TUNNEL RELEASE Right 2023    Procedure: RELEASE, CARPAL TUNNEL;  Surgeon: Sameer Kingsley MD;  Location: [facility];  Service: Orthopedics;  Laterality: Right;     SECTION  08/10/2008     SECTION  01/15/2013    DILATION AND CURETTAGE OF UTERUS  2012     Family History   Problem Relation Name Age of Onset    Hypertension Mother      Hypertension Father      No Known Problems Brother      No Known Problems Sister      No Known Problems Sister      No Known Problems Sister       Social History     Tobacco Use    Smoking status: Every Day     Current packs/day: 1.00     Average packs/day: 1 pack/day for 18.4 years (18.4 ttl pk-yrs)     Types: Cigarettes     Start date:     Smokeless tobacco: Never   Substance Use Topics "    Alcohol use: Never    Drug use: Never     Review of Systems   Constitutional:  Negative for chills, diaphoresis, fatigue and fever.   HENT:  Negative for congestion, rhinorrhea, sinus pressure, sinus pain and sore throat.    Respiratory:  Negative for cough, chest tightness, shortness of breath and wheezing.    Cardiovascular:  Negative for chest pain, palpitations and leg swelling.   Gastrointestinal:  Positive for abdominal pain, constipation, nausea and vomiting. Negative for abdominal distention and diarrhea.   Genitourinary:  Positive for urgency.   Musculoskeletal:  Negative for arthralgias and myalgias.   Skin:  Negative for rash and wound.   Neurological:  Negative for dizziness, syncope, weakness, light-headedness and headaches.   All other systems reviewed and are negative.      Physical Exam     Initial Vitals [05/18/24 1633]   BP Pulse Resp Temp SpO2   125/71 84 16 98.2 °F (36.8 °C) 99 %      MAP       --         Physical Exam    Nursing note and vitals reviewed.  Constitutional: She appears well-developed and well-nourished.   HENT:   Head: Normocephalic and atraumatic.   Right Ear: External ear normal.   Left Ear: External ear normal.   Nose: Nose normal.   Eyes: Conjunctivae and EOM are normal. Pupils are equal, round, and reactive to light. No scleral icterus.   Neck:   Normal range of motion.  Cardiovascular:  Normal rate, regular rhythm and normal heart sounds.     Exam reveals no gallop and no friction rub.       No murmur heard.  Pulmonary/Chest: Breath sounds normal. No respiratory distress. She has no wheezes. She has no rhonchi. She has no rales.   Abdominal: Abdomen is soft. Bowel sounds are normal. She exhibits no distension. There is no abdominal tenderness. There is no rebound and no guarding.   Musculoskeletal:         General: No tenderness or edema. Normal range of motion.      Cervical back: Normal range of motion.     Neurological: She is alert and oriented to person, place, and  time.   Skin: Skin is warm and dry. No erythema.   Psychiatric: She has a normal mood and affect. Thought content normal.         Medical Screening Exam   See Full Note    ED Course   Procedures  Labs Reviewed   COMPREHENSIVE METABOLIC PANEL - Abnormal; Notable for the following components:       Result Value    Chloride 113 (*)     Globulin 4.1 (*)     AST 14 (*)     All other components within normal limits   CBC WITH DIFFERENTIAL - Abnormal; Notable for the following components:    RBC 3.98 (*)     Hemoglobin 10.3 (*)     Hematocrit 33.7 (*)     MCH 25.9 (*)     MCHC 30.6 (*)     RDW 15.3 (*)     Monocytes % 7.0 (*)     Eosinophils % 0.9 (*)     All other components within normal limits   LIPASE - Normal   CBC W/ AUTO DIFFERENTIAL    Narrative:     The following orders were created for panel order CBC W/ AUTO DIFFERENTIAL.  Procedure                               Abnormality         Status                     ---------                               -----------         ------                     CBC with Differential[[id number]]       Abnormal            Final result                 Please view results for these tests on the individual orders.   URINALYSIS, REFLEX TO URINE CULTURE   POCT URINE PREGNANCY          Imaging Results              X-Ray Abdomen Flat And Erect (Final result)  Result time 05/18/24 18:06:04      Final result by Harley Clemons II, MD (05/18/24 18:06:04)                   Impression:      Increased stool volume in the colon, may indicate constipation.      Electronically signed by: Harley Clemons  Date:    05/18/2024  Time:    18:06               Narrative:    EXAMINATION:  XR ABDOMEN FLAT AND ERECT    CLINICAL HISTORY:  Constipation, unspecified    TECHNIQUE:  XR ABDOMEN FLAT AND ERECT    COMPARISON:  3 August 2023    FINDINGS:  No free fluid or free air seen.  Increased stool volume is seen in the colon.  The bowel gas pattern otherwise appears within normal limits.  No abnormal  calcifications are present.  No other abnormality is identified.                                       Medications - No data to display  Medical Decision Making  MDM    Patient presents for emergent evaluation of acute Abdominal pain/Constipation that poses a threat to life and/or bodily function.    In the ED patient found to have acute constipation.    I ordered labs and personally reviewed them.  Labs significant for No acute findings  I ordered X-rays and personally reviewed them and reviewed the radiologist interpretation.  Xray significant for Constipation.        Discharge MDM  I discussed the patient presentation and findings with the consultant for Dr. Reaves (ED).    Pt to be treated with OTC docusate and Miralax  Patient was discharged in stable condition.  Detailed return precautions discussed.      Amount and/or Complexity of Data Reviewed  Labs: ordered.  Radiology: ordered.               ED Course as of 05/18/24 2304   Sat May 18, 2024   1759 Sign out.  Constipation.  Last BM today.  Hard balls of stool.  GERD.  Workup negative.  Abdominal xray neg. [PK]   1833 Seen also by attending physician.  Abdominal exam was benign.  Patient with a constipation.  She hardly drinks any water.  Will increase her water intake to 5 8 glasses a day.  Also she will start taking stool softeners twice a day in at least 1 capful of MiraLax daily.  For this episode will take for scoops of MiraLax in who Gatorade or prune juice.  Also advise her to use enemas every 12 hours for 3 doses as needed.  She voiced understanding to return to the ER and return to the ER if new symptoms develop  [PK]      ED Course User Index  [PK] Kory Reaves MD                           Clinical Impression:   Final diagnoses:  [K59.00] Constipation, unspecified constipation type (Primary)        ED Disposition Condition    Discharge Stable          ED Prescriptions    None       Follow-up Information    None          Kory Reaves  MD [facility]  05/18/24 2943

## 2024-05-18 NOTE — ED TRIAGE NOTES
Chief Complaint   Patient presents with    Vomiting    Abdominal Pain     Pt presents to ed with c/o having vomiting, constipation and abdominal pain for 2 weeks

## 2024-05-18 NOTE — DISCHARGE INSTRUCTIONS
Use stool softener twice a day.  Also drank 5-8 glasses of water per day.  Also use 1 scoop of MiraLax every day.  All this is to help you prevent getting constipated.    When she will get constipated like this can take 4 scoops of MiraLax and either a large regular Gatorade or with prune juice    Use enemas as needed as we discussed

## 2024-06-19 ENCOUNTER — OFFICE VISIT (OUTPATIENT)
Dept: OBSTETRICS AND GYNECOLOGY | Facility: CLINIC | Age: 34
End: 2024-06-19
Payer: MEDICAID

## 2024-06-19 VITALS
TEMPERATURE: 98 F | OXYGEN SATURATION: 99 % | WEIGHT: 205.38 LBS | BODY MASS INDEX: 33.01 KG/M2 | SYSTOLIC BLOOD PRESSURE: 99 MMHG | HEIGHT: 66 IN | HEART RATE: 79 BPM | DIASTOLIC BLOOD PRESSURE: 60 MMHG

## 2024-06-19 DIAGNOSIS — Z01.419 WELL WOMAN EXAM WITH ROUTINE GYNECOLOGICAL EXAM: Primary | ICD-10-CM

## 2024-06-19 DIAGNOSIS — Z12.4 ENCOUNTER FOR SCREENING FOR MALIGNANT NEOPLASM OF CERVIX: ICD-10-CM

## 2024-06-19 DIAGNOSIS — Z30.011 ENCOUNTER FOR INITIAL PRESCRIPTION OF CONTRACEPTIVE PILLS: ICD-10-CM

## 2024-06-19 DIAGNOSIS — Z30.09 GENERAL COUNSELING FOR PRESCRIPTION OF ORAL CONTRACEPTIVES: ICD-10-CM

## 2024-06-19 DIAGNOSIS — N94.9 UTERINE TENDERNESS: ICD-10-CM

## 2024-06-19 DIAGNOSIS — R35.0 INCREASED FREQUENCY OF URINATION: ICD-10-CM

## 2024-06-19 DIAGNOSIS — Z12.4 PAP SMEAR FOR CERVICAL CANCER SCREENING: ICD-10-CM

## 2024-06-19 LAB
B-HCG UR QL: NEGATIVE
BILIRUB SERPL-MCNC: NEGATIVE MG/DL
BLOOD, POC UA: NEGATIVE
CTP QC/QA: YES
GLUCOSE UR QL STRIP: NEGATIVE
KETONES UR QL STRIP: NEGATIVE
LEUKOCYTE ESTERASE URINE, POC: NEGATIVE
NITRITE, POC UA: NEGATIVE
PH, POC UA: 8
PROTEIN, POC: NEGATIVE
SPECIFIC GRAVITY, POC UA: 1.02
UROBILINOGEN, POC UA: 1

## 2024-06-19 PROCEDURE — 88142 CYTOPATH C/V THIN LAYER: CPT | Mod: TC,GCY | Performed by: ADVANCED PRACTICE MIDWIFE

## 2024-06-19 PROCEDURE — 99395 PREV VISIT EST AGE 18-39: CPT | Mod: ,,, | Performed by: ADVANCED PRACTICE MIDWIFE

## 2024-06-19 PROCEDURE — 3008F BODY MASS INDEX DOCD: CPT | Mod: CPTII,,, | Performed by: ADVANCED PRACTICE MIDWIFE

## 2024-06-19 PROCEDURE — 81025 URINE PREGNANCY TEST: CPT | Mod: QW,,, | Performed by: ADVANCED PRACTICE MIDWIFE

## 2024-06-19 PROCEDURE — 3078F DIAST BP <80 MM HG: CPT | Mod: CPTII,,, | Performed by: ADVANCED PRACTICE MIDWIFE

## 2024-06-19 PROCEDURE — 81003 URINALYSIS AUTO W/O SCOPE: CPT | Mod: QW,,, | Performed by: ADVANCED PRACTICE MIDWIFE

## 2024-06-19 PROCEDURE — 3074F SYST BP LT 130 MM HG: CPT | Mod: CPTII,,, | Performed by: ADVANCED PRACTICE MIDWIFE

## 2024-06-19 PROCEDURE — 87624 HPV HI-RISK TYP POOLED RSLT: CPT | Mod: ,,, | Performed by: CLINICAL MEDICAL LABORATORY

## 2024-06-19 PROCEDURE — 1159F MED LIST DOCD IN RCRD: CPT | Mod: CPTII,,, | Performed by: ADVANCED PRACTICE MIDWIFE

## 2024-06-19 PROCEDURE — 87591 N.GONORRHOEAE DNA AMP PROB: CPT | Mod: ,,, | Performed by: CLINICAL MEDICAL LABORATORY

## 2024-06-19 PROCEDURE — 87491 CHLMYD TRACH DNA AMP PROBE: CPT | Mod: ,,, | Performed by: CLINICAL MEDICAL LABORATORY

## 2024-06-19 RX ORDER — NORETHINDRONE 0.35 MG/1
1 TABLET ORAL DAILY
Qty: 30 TABLET | Refills: 12 | Status: SHIPPED | OUTPATIENT
Start: 2024-06-19 | End: 2025-06-19

## 2024-06-19 NOTE — PROGRESS NOTES
CC: Here for pap smear, annual exam    Elsa Duran is a 33 y.o. female  presents for well woman exam.  LMP: Patient's last menstrual period was 2024 (exact date).. Menses are: monthly, lasts 5 days, heavy, with clots. States is not sexually active with last sexual contact noted . C/O increased urination and doesn't drink a lot of water. States has a h/o constipation and c/o some lower abdominal discomforts. Denies any further issues, problems, or complaints.    Last mammogram: n/a  Colonoscopy: n/a    Past Medical History:   Diagnosis Date    History of gonorrhea     Hypertension     Migraine without aura, intractable, without status migrainosus     Noncompliance with medication regimen     Syncope      Past Surgical History:   Procedure Laterality Date    Abdominal Wall Mass Surgery  2019    CARPAL TUNNEL RELEASE Right 2023    Procedure: RELEASE, CARPAL TUNNEL;  Surgeon: Sameer Kingsley MD;  Location: [facility];  Service: Orthopedics;  Laterality: Right;     SECTION  08/10/2008     SECTION  01/15/2013    DILATION AND CURETTAGE OF UTERUS  2012     Social History     Socioeconomic History    Marital status: Single   Tobacco Use    Smoking status: Every Day     Current packs/day: 1.00     Average packs/day: 1 pack/day for 18.5 years (18.5 ttl pk-yrs)     Types: Cigarettes     Start date:     Smokeless tobacco: Never   Substance and Sexual Activity    Alcohol use: Never    Drug use: Never    Sexual activity: Yes     Partners: Male     Social Determinants of Health     Financial Resource Strain: High Risk (2024)    Overall Financial Resource Strain (CARDIA)     Difficulty of Paying Living Expenses: Very hard   Food Insecurity: Food Insecurity Present (2024)    Hunger Vital Sign     Worried About Running Out of Food in the Last Year: Often true     Ran Out of Food in the Last Year: Sometimes true   Transportation Needs: Unmet Transportation  "Needs (2024)    PRAPARE - Transportation     Lack of Transportation (Medical): Yes     Lack of Transportation (Non-Medical): Yes   Physical Activity: Inactive (2024)    Exercise Vital Sign     Days of Exercise per Week: 0 days     Minutes of Exercise per Session: 0 min   Stress: Stress Concern Present (2024)    Citizen of the [place] of Occupational Health - Occupational Stress Questionnaire     Feeling of Stress : Rather much   Housing Stability: Unknown (2024)    Housing Stability Vital Sign     Unable to Pay for Housing in the Last Year: Patient declined     Family History   Problem Relation Name Age of Onset    Hypertension Mother      Hypertension Father      No Known Problems Brother      No Known Problems Sister      No Known Problems Sister      No Known Problems Sister       OB History          4    Para   2    Term                AB   1    Living   2         SAB   1    IAB        Ectopic        Multiple        Live Births   2                 BP 99/60   Pulse 79   Temp 98.3 °F (36.8 °C)   Ht 5' 6" (1.676 m)   Wt 93.2 kg (205 lb 6.4 oz)   LMP 2024 (Exact Date)   SpO2 99%   BMI 33.15 kg/m²       ROS:  GENERAL: Denies weight gain or weight loss. Feeling well overall.   SKIN: Denies rash or lesions.   HEAD: Denies head injury or headache.   NODES: Denies enlarged lymph nodes.   CHEST: Denies chest pain or shortness of breath.   CARDIOVASCULAR: Denies palpitations or left sided chest pain.   ABDOMEN: No abdominal pain, constipation, diarrhea, nausea, vomiting or rectal bleeding.   URINARY: No frequency, dysuria, hematuria, or burning on urination.  REPRODUCTIVE: See HPI.   BREASTS: The patient performs breast self-examination and denies pain, lumps, or nipple discharge.   HEMATOLOGIC: No easy bruisability or excessive bleeding.   MUSCULOSKELETAL: Denies joint pain or swelling.   NEUROLOGIC: Denies syncope or weakness.   PSYCHIATRIC: Denies depression, anxiety or mood " swings.    PHYSICAL EXAM:  APPEARANCE: Well nourished, well developed, in no acute distress.  AFFECT: WNL, alert and oriented x 3  SKIN: No acne or hirsutism. Multiple tattoos noted to neck, arms  NECK: Neck symmetric without masses or thyromegaly  NODES: No inguinal, cervical, axillary, or femoral lymph node enlargement  CHEST: Good respiratory effect  ABDOMEN: Soft.  No tenderness or masses.  No hepatosplenomegaly.  No hernias.  BREASTS: Symmetrical, no skin changes or visible lesions.  No palpable masses, nipple discharge bilaterally.  PELVIC: Normal external genitalia without lesions.  Normal hair distribution.  Adequate perineal body, normal urethral meatus.  Vagina moist and well rugated without lesions, with no discharge.  Cervix pink, without lesions, tenderness with no discharge.  No significant cystocele or rectocele.  Bimanual exam shows uterus to be normal size, regular, mobile and tender.  Adnexa without masses or tenderness.    EXTREMITIES: No edema.  U/A WNL    Well woman exam with routine gynecological exam  -     ThinPrep Pap Test; Future; Expected date: 06/19/2024    Encounter for screening for malignant neoplasm of cervix  -     ThinPrep Pap Test; Future; Expected date: 06/19/2024    Pap smear for cervical cancer screening  -     Ambulatory referral/consult to Obstetrics / Gynecology    BMI 33.0-33.9,adult    General counseling for prescription of oral contraceptives  -     POCT urine pregnancy    Uterine tenderness  -     Chlamydia/GC, PCR; Future; Expected date: 06/19/2024    Increased frequency of urination  -     POCT URINALYSIS    Encounter for initial prescription of contraceptive pills  -     norethindrone (MICRONOR) 0.35 mg tablet; Take 1 tablet (0.35 mg total) by mouth once daily.  Dispense: 30 tablet; Refill: 12        ICD-10-CM ICD-9-CM    1. Well woman exam with routine gynecological exam  Z01.419 V72.31 ThinPrep Pap Test      ThinPrep Pap Test      2. Encounter for screening for  malignant neoplasm of cervix  Z12.4 V76.2 ThinPrep Pap Test      ThinPrep Pap Test      3. Pap smear for cervical cancer screening  Z12.4 V76.2 Ambulatory referral/consult to Obstetrics / Gynecology      4. BMI 33.0-33.9,adult  Z68.33 V85.33       5. General counseling for prescription of oral contraceptives  Z30.09 V25.01 POCT urine pregnancy      6. Uterine tenderness  N94.9 625.9 Chlamydia/GC, PCR      Chlamydia/GC, PCR      7. Increased frequency of urination  R35.0 788.41 POCT URINALYSIS      8. Encounter for initial prescription of contraceptive pills  Z30.011 V25.01 norethindrone (MICRONOR) 0.35 mg tablet          Patient was counseled today on A.C.S. Pap guidelines and recommendations for yearly pelvic exams, mammograms and monthly self breast exams; to see her PCP for other health maintenance.   Exercise and weight loss regimen encouraged  Healthy food choices encouraged  Multivitamins daily  Vitamin D daily  Discussed ACHES (abdominal pain, chest pain, headaches, epigastric pain, stroke s/s or embolis/blood clot s/s) with oral contraceptives/Xulane or Ortho Evra Patch/NuvaRing use, if noted, F/U @ ER  or clinic for evaluation  Use back up method for first month's use of oral contraceptives/Xulane or Ortho Evra Patch/NuvaRing, if on antibiotics, or if not taking pills correctly  Discussed oral contraceptives/Xulane or Ortho Evra Patch/NuvaRing, does not protect against STIs/STDs  Discussed ACHES (abdominal pain, chest pain, headaches, epigastric pain, stroke s/s or embolis/blood clot s/s) with oral contraceptives/Xulane or Ortho Evra Patch/NuvaRing use, if noted, F/U @ ER  or clinic for evaluation  Use back up method for first month's use of oral contraceptives/Xulane or Ortho Evra Patch/NuvaRing, if on antibiotics, or if not taking pills correctly  Discussed oral contraceptives/Xulane or Ortho Evra Patch/NuvaRing, does not protect against STIs/STDs  Start birth control on first day of next menstrual  cycle  Questions answered to desired level of satisfaction  Verbalized understanding to all information and instructions    Follow up in about 1 year (around 6/19/2025), or if symptoms worsen or fail to improve, for Annual Exam.

## 2024-06-20 LAB
GH SERPL-MCNC: NORMAL NG/ML
INSULIN SERPL-ACNC: NORMAL U[IU]/ML
LAB AP CLINICAL INFORMATION: NORMAL
LAB AP GYN INTERPRETATION: NEGATIVE
LAB AP PAP DISCLAIMER COMMENTS: NORMAL
RENIN PLAS-CCNC: NORMAL NG/ML/H

## 2024-06-21 LAB
CHLAMYDIA BY PCR: NEGATIVE
N. GONORRHOEAE (GC) BY PCR: NEGATIVE

## 2024-06-22 LAB
HPV 16: NEGATIVE
HPV 18: NEGATIVE
HPV OTHER: NEGATIVE

## 2024-08-01 DIAGNOSIS — M79.642 LEFT HAND PAIN: Primary | ICD-10-CM

## 2024-08-08 DIAGNOSIS — M79.641 RIGHT HAND PAIN: Primary | ICD-10-CM

## 2024-08-19 PROBLEM — N39.0 URINARY TRACT INFECTION WITHOUT HEMATURIA: Status: RESOLVED | Noted: 2024-05-14 | Resolved: 2024-08-19

## 2024-10-08 ENCOUNTER — APPOINTMENT (OUTPATIENT)
Dept: RADIOLOGY | Facility: CLINIC | Age: 34
End: 2024-10-08
Attending: INTERNAL MEDICINE
Payer: MEDICAID

## 2024-10-08 ENCOUNTER — OFFICE VISIT (OUTPATIENT)
Dept: FAMILY MEDICINE | Facility: CLINIC | Age: 34
End: 2024-10-08
Payer: MEDICAID

## 2024-10-08 VITALS
OXYGEN SATURATION: 98 % | WEIGHT: 199 LBS | RESPIRATION RATE: 17 BRPM | SYSTOLIC BLOOD PRESSURE: 118 MMHG | DIASTOLIC BLOOD PRESSURE: 84 MMHG | TEMPERATURE: 97 F | HEART RATE: 107 BPM | BODY MASS INDEX: 31.98 KG/M2 | HEIGHT: 66 IN

## 2024-10-08 DIAGNOSIS — M19.011 OSTEOARTHRITIS OF RIGHT SHOULDER, UNSPECIFIED OSTEOARTHRITIS TYPE: Primary | ICD-10-CM

## 2024-10-08 DIAGNOSIS — M19.011 OSTEOARTHRITIS OF RIGHT SHOULDER, UNSPECIFIED OSTEOARTHRITIS TYPE: ICD-10-CM

## 2024-10-08 PROCEDURE — 3008F BODY MASS INDEX DOCD: CPT | Mod: CPTII,,, | Performed by: INTERNAL MEDICINE

## 2024-10-08 PROCEDURE — 96372 THER/PROPH/DIAG INJ SC/IM: CPT | Mod: ,,, | Performed by: INTERNAL MEDICINE

## 2024-10-08 PROCEDURE — 3074F SYST BP LT 130 MM HG: CPT | Mod: CPTII,,, | Performed by: INTERNAL MEDICINE

## 2024-10-08 PROCEDURE — 73030 X-RAY EXAM OF SHOULDER: CPT | Mod: TC,RHCUB,RT | Performed by: INTERNAL MEDICINE

## 2024-10-08 PROCEDURE — 73030 X-RAY EXAM OF SHOULDER: CPT | Mod: 26,RT,, | Performed by: RADIOLOGY

## 2024-10-08 PROCEDURE — 99213 OFFICE O/P EST LOW 20 MIN: CPT | Mod: 25,,, | Performed by: INTERNAL MEDICINE

## 2024-10-08 PROCEDURE — 3079F DIAST BP 80-89 MM HG: CPT | Mod: CPTII,,, | Performed by: INTERNAL MEDICINE

## 2024-10-08 RX ORDER — IBUPROFEN 800 MG/1
800 TABLET ORAL EVERY 8 HOURS PRN
Qty: 20 TABLET | Refills: 1 | Status: CANCELLED | OUTPATIENT
Start: 2024-10-08

## 2024-10-08 RX ORDER — MELOXICAM 15 MG/1
15 TABLET ORAL DAILY
Qty: 30 TABLET | Refills: 2 | Status: SHIPPED | OUTPATIENT
Start: 2024-10-08

## 2024-10-08 RX ORDER — KETOROLAC TROMETHAMINE 30 MG/ML
15 INJECTION, SOLUTION INTRAMUSCULAR; INTRAVENOUS
Status: COMPLETED | OUTPATIENT
Start: 2024-10-08 | End: 2024-10-08

## 2024-10-08 RX ORDER — OLANZAPINE 5 MG/1
5 TABLET ORAL NIGHTLY
COMMUNITY
Start: 2024-10-03

## 2024-10-08 RX ADMIN — KETOROLAC TROMETHAMINE 15 MG: 30 INJECTION, SOLUTION INTRAMUSCULAR; INTRAVENOUS at 11:10

## 2024-10-10 ENCOUNTER — OFFICE VISIT (OUTPATIENT)
Dept: NEUROLOGY | Facility: CLINIC | Age: 34
End: 2024-10-10
Payer: MEDICAID

## 2024-10-10 VITALS
HEART RATE: 98 BPM | SYSTOLIC BLOOD PRESSURE: 124 MMHG | WEIGHT: 195.38 LBS | OXYGEN SATURATION: 99 % | DIASTOLIC BLOOD PRESSURE: 98 MMHG | HEIGHT: 66 IN | BODY MASS INDEX: 31.4 KG/M2

## 2024-10-10 DIAGNOSIS — Z91.199 NON-COMPLIANCE: ICD-10-CM

## 2024-10-10 DIAGNOSIS — G43.709 CHRONIC MIGRAINE WITHOUT AURA WITHOUT STATUS MIGRAINOSUS, NOT INTRACTABLE: Primary | ICD-10-CM

## 2024-10-10 PROCEDURE — 1159F MED LIST DOCD IN RCRD: CPT | Mod: CPTII,,, | Performed by: NURSE PRACTITIONER

## 2024-10-10 PROCEDURE — 3008F BODY MASS INDEX DOCD: CPT | Mod: CPTII,,, | Performed by: NURSE PRACTITIONER

## 2024-10-10 PROCEDURE — 3080F DIAST BP >= 90 MM HG: CPT | Mod: CPTII,,, | Performed by: NURSE PRACTITIONER

## 2024-10-10 PROCEDURE — 3074F SYST BP LT 130 MM HG: CPT | Mod: CPTII,,, | Performed by: NURSE PRACTITIONER

## 2024-10-10 PROCEDURE — 99999 PR PBB SHADOW E&M-EST. PATIENT-LVL IV: CPT | Mod: PBBFAC,,, | Performed by: NURSE PRACTITIONER

## 2024-10-10 PROCEDURE — 1160F RVW MEDS BY RX/DR IN RCRD: CPT | Mod: CPTII,,, | Performed by: NURSE PRACTITIONER

## 2024-10-10 PROCEDURE — 99214 OFFICE O/P EST MOD 30 MIN: CPT | Mod: PBBFAC | Performed by: NURSE PRACTITIONER

## 2024-10-10 PROCEDURE — 99213 OFFICE O/P EST LOW 20 MIN: CPT | Mod: S$PBB,,, | Performed by: NURSE PRACTITIONER

## 2024-10-10 RX ORDER — GALCANEZUMAB 120 MG/ML
120 INJECTION, SOLUTION SUBCUTANEOUS
Qty: 1 EACH | Refills: 11 | Status: SHIPPED | OUTPATIENT
Start: 2024-10-10

## 2024-10-10 NOTE — PROGRESS NOTES
Subjective:       Patient ID: Elsa Duran is a 34 y.o. female     Chief Complaint:    Chief Complaint   Patient presents with    Follow-up     Pt states that she has had a headache for 3 days now, which is causing her eyes to hurt.        Allergies:  Patient has no known allergies.    Current Medications:    Outpatient Encounter Medications as of 10/10/2024   Medication Sig Dispense Refill    amLODIPine (NORVASC) 10 MG tablet Take 1 tablet (10 mg total) by mouth once daily. 90 tablet 1    meloxicam (MOBIC) 15 MG tablet Take 1 tablet (15 mg total) by mouth once daily. 30 tablet 2    norethindrone (MICRONOR) 0.35 mg tablet Take 1 tablet (0.35 mg total) by mouth once daily. 30 tablet 12    OLANZapine (ZYPREXA) 5 MG tablet Take 5 mg by mouth every evening.      propranoloL (INDERAL) 40 MG tablet Take 1 tablet (40 mg total) by mouth 2 (two) times daily. 60 tablet 11    topiramate (TOPAMAX) 100 MG tablet Take 1 tablet (100 mg total) by mouth 2 (two) times daily. 120 tablet 2    ergocalciferol (ERGOCALCIFEROL) 50,000 unit Cap Take 1 capsule (50,000 Units total) by mouth every 7 days. (Patient not taking: Reported on 10/8/2024) 4 capsule 2    galcanezumab-gnlm (EMGALITY PEN) 120 mg/mL PnIj Inject 1 mL (120 mg total) into the skin every 28 days. 1 each 11    nitrofurantoin, macrocrystal-monohydrate, (MACROBID) 100 MG capsule Take 1 capsule (100 mg total) by mouth 2 (two) times daily. (Patient not taking: Reported on 10/8/2024) 10 capsule 0    oxyCODONE-acetaminophen (PERCOCET)  mg per tablet Take 1 tablet by mouth every 6 (six) hours as needed for Pain. (Patient not taking: Reported on 10/8/2024) 28 tablet 0    polyethylene glycol (GLYCOLAX) 17 gram PwPk Take 17 g by mouth once daily. (Patient not taking: Reported on 10/8/2024) 30 each 1     No facility-administered encounter medications on file as of 10/10/2024.       History of Present Illness  33 yr old A.A. female presents with headaches and prior reported  syncope.  She is known non-compliant with neurology follow-up     Last seen in clinic 11 months ago, she cancelled her follow-up visit, then had appointments on 7/1/24 and 8/6/24 and cancelled them.  Then did not show for appt on 8/12/24.  This is a recurring thing.  Prior to last visit 11 months ago, she had not been seen in 2 years with multiple cancelled appt.    She is now on zyprexa now, just started on it, she hesitates to answer when asked about this.    Prior MRI brain and MRA negative, EEG as well negative.  For syncope we actually had referred her to cardiology but she did not keep those appt either.    Prior prescribed topamax, she reported was not effective, but the pharmacy reported she never started the medication because she never obtained it per their records so she was not honest about this.  Is see seperez was just prescribed topamax by her primary, pharmacy reported she did fill this latest prescription, 34 days ago.      Prior was prescribed propranolol for headaches, but also for poorly controlled HTN.  But again strong question about compliance with taking her medications.  She says she is taking it, but I contacted her pharmacy, she has not filled it since July, and she has refills, so again, non-compliance.  I spent a great deal of time today discussing this, it will not do her any good if she does not take the prescribed medications as directed and if she is not keeping follow-up it makes things a little hard to manage her symptoms.  CGRP medications could be option, but she is too non-compliant for us to get her approved for them likely.  I can try her to see if insurance Ajovy    In the past on all visits she openly admitted to daily use of OTC motrin or tylenol for headache.  She is not a candidate for triptans due to her poorly controlled BP and given her history of overuse headaches fioricet is a poor choice in any case and not recommended anyway.             Review of Systems  Review of  Systems   Constitutional:  Negative for diaphoresis and fever.   HENT:  Negative for congestion, hearing loss and tinnitus.    Eyes:  Negative for blurred vision, double vision, photophobia, discharge and redness.   Respiratory:  Negative for cough and shortness of breath.    Cardiovascular:  Negative for chest pain.   Gastrointestinal:  Negative for abdominal pain, nausea and vomiting.   Musculoskeletal:  Negative for back pain, joint pain, myalgias and neck pain.   Skin:  Negative for itching and rash.   Neurological:  Positive for headaches. Negative for dizziness, tremors, sensory change, speech change, focal weakness, seizures, loss of consciousness and weakness.   Psychiatric/Behavioral:  Negative for depression, hallucinations and memory loss. The patient does not have insomnia.    All other systems reviewed and are negative.     Objective:     NEUROLOGICAL EXAMINATION:     MENTAL STATUS   Oriented to person, place, and time.   Registration: recalls 3 of 3 objects. Recall at 5 minutes: recalls 3 of 3 objects.   Attention: normal. Concentration: normal.   Speech: speech is normal   Level of consciousness: alert  Knowledge: good and consistent with education.   Normal comprehension.     CRANIAL NERVES     CN II   Visual fields full to confrontation.   Visual acuity: normal  Right visual field deficit: none  Left visual field deficit: none     CN III, IV, VI   Pupils are equal, round, and reactive to light.  Extraocular motions are normal.   Right pupil: Size: 3 mm. Shape: regular. Reactivity: brisk. Consensual response: intact. Accommodation: intact.   Left pupil: Size: 3 mm. Shape: regular. Reactivity: brisk. Consensual response: intact. Accommodation: intact.   CN III: no CN III palsy  CN VI: no CN VI palsy  Nystagmus: none   Diplopia: none  Upgaze: normal  Downgaze: normal  Conjugate gaze: present  Vestibulo-ocular reflex: present    CN V   Facial sensation intact.   Right facial sensation deficit:  none  Left facial sensation deficit: none  Right corneal reflex: normal  Left corneal reflex: normal    CN VII   Facial expression full, symmetric.   Right facial weakness: none  Left facial weakness: none  Right taste: normal  Left taste: normal    CN VIII   CN VIII normal.   Hearing: intact    CN IX, X   CN IX normal.   CN X normal.   Palate: symmetric    CN XI   CN XI normal.   Right sternocleidomastoid strength: normal  Left sternocleidomastoid strength: normal  Right trapezius strength: normal  Left trapezius strength: normal    CN XII   CN XII normal.   Tongue: not atrophic  Fasciculations: absent  Tongue deviation: none    MOTOR EXAM   Muscle bulk: normal  Overall muscle tone: normal  Right arm tone: normal  Left arm tone: normal  Right arm pronator drift: absent  Left arm pronator drift: absent  Right leg tone: normal  Left leg tone: normal    Strength   Right neck flexion: 5/5  Left neck flexion: 5/5  Right neck extension: 5/5  Left neck extension: 5/5  Right deltoid: 5/5  Left deltoid: 5/5  Right biceps: 5/5  Left biceps: 5/5  Right triceps: 5/5  Left triceps: 5/5  Right wrist flexion: 5/5  Left wrist flexion: 5/5  Right wrist extension: 5/5  Left wrist extension: 5/5  Right interossei: 5/5  Left interossei: 5/5  Right iliopsoas: 5/5  Left iliopsoas: 5/5  Right quadriceps: 5/5  Left quadriceps: 5/5  Right hamstrin/5  Left hamstrin/5  Right anterior tibial: 5/5  Left anterior tibial: 5/5  Right posterior tibial: 5/5  Left posterior tibial: 5/5  Right gastroc: 5/5  Left gastroc: 5/5    REFLEXES     Reflexes   Right brachioradialis: 2+  Left brachioradialis: 2+  Right biceps: 2+  Left biceps: 2+  Right triceps: 2+  Left triceps: 2+  Right patellar: 2+  Left patellar: 2+  Right achilles: 2+  Left achilles: 2+  Right plantar: normal  Left plantar: normal  Right Rosario: absent  Left Rosario: absent  Right ankle clonus: absent  Left ankle clonus: absent  Right pendular knee jerk: absent  Left pendular  knee jerk: absent    SENSORY EXAM   Light touch normal.   Right arm light touch: normal  Left arm light touch: normal  Right leg light touch: normal  Left leg light touch: normal  Vibration normal.   Right arm vibration: normal  Left arm vibration: normal  Right leg vibration: normal  Left leg vibration: normal  Proprioception normal.   Right arm proprioception: normal  Left arm proprioception: normal  Right leg proprioception: normal  Left leg proprioception: normal  Pinprick normal.   Right arm pinprick: normal  Left arm pinprick: normal  Right leg pinprick: normal  Left leg pinprick: normal  Graphesthesia: normal  Romberg: negative  Stereognosis: normal    GAIT AND COORDINATION     Gait  Gait: normal     Coordination   Finger to nose coordination: normal  Heel to shin coordination: normal  Tandem walking coordination: normal    Tremor   Resting tremor: absent  Intention tremor: absent  Action tremor: absent       Physical Exam  Vitals and nursing note reviewed.   Constitutional:       Appearance: Normal appearance.   HENT:      Head: Normocephalic.   Eyes:      Extraocular Movements: Extraocular movements intact and EOM normal.      Pupils: Pupils are equal, round, and reactive to light.   Cardiovascular:      Rate and Rhythm: Normal rate and regular rhythm.   Pulmonary:      Effort: Pulmonary effort is normal.      Breath sounds: Normal breath sounds.   Musculoskeletal:         General: No swelling or tenderness. Normal range of motion.      Cervical back: Normal range of motion and neck supple.      Right lower leg: No edema.      Left lower leg: No edema.   Skin:     General: Skin is warm and dry.      Coloration: Skin is not jaundiced.      Findings: No rash.   Neurological:      General: No focal deficit present.      Mental Status: She is alert and oriented to person, place, and time.      GCS: GCS eye subscore is 4. GCS verbal subscore is 5. GCS motor subscore is 6.      Cranial Nerves: No cranial nerve  deficit.      Sensory: No sensory deficit.      Motor: Motor function is intact. No weakness.      Coordination: Coordination is intact. Coordination normal. Finger-Nose-Finger Test, Heel to Shin Test and Romberg Test normal.      Gait: Gait is intact. Gait and tandem walk normal.      Deep Tendon Reflexes: Reflexes normal.      Reflex Scores:       Tricep reflexes are 2+ on the right side and 2+ on the left side.       Bicep reflexes are 2+ on the right side and 2+ on the left side.       Brachioradialis reflexes are 2+ on the right side and 2+ on the left side.       Patellar reflexes are 2+ on the right side and 2+ on the left side.       Achilles reflexes are 2+ on the right side and 2+ on the left side.  Psychiatric:         Mood and Affect: Mood normal.         Speech: Speech normal.         Behavior: Behavior normal.          Assessment:     Problem List Items Addressed This Visit          Neuro    Chronic migraine without aura without status migrainosus, not intractable - Primary    Relevant Medications    galcanezumab-gnlm (EMGALITY PEN) 120 mg/mL PnIj       Palliative Care    Non-compliance            Primary Diagnosis and ICD10  Chronic migraine without aura without status migrainosus, not intractable [G43.709]    Plan:     There are no Patient Instructions on file for this visit.    There are no discontinued medications.        Requested Prescriptions     Signed Prescriptions Disp Refills    galcanezumab-gnlm (EMGALITY PEN) 120 mg/mL PnIj 1 each 11     Sig: Inject 1 mL (120 mg total) into the skin every 28 days.       No orders of the defined types were placed in this encounter.

## 2024-10-14 ENCOUNTER — TELEPHONE (OUTPATIENT)
Dept: NEUROLOGY | Facility: CLINIC | Age: 34
End: 2024-10-14
Payer: MEDICAID

## 2024-10-14 NOTE — TELEPHONE ENCOUNTER
Called pt got v/m left message rx sent in and PA sent in Friday waiting on insurance for PA results.

## 2024-10-14 NOTE — TELEPHONE ENCOUNTER
----- Message from NICKOLAS Ahuja sent at 10/14/2024 10:02 AM CDT -----  Regarding: RE: Refill Request  I sent the Rx to them last week  ----- Message -----  From: Kasia Grande LPN  Sent: 10/14/2024   9:03 AM CDT  To: NICKOLAS Burch  Subject: FW: Refill Request                                 ----- Message -----  From: Ilda Lennon  Sent: 10/14/2024   9:02 AM CDT  To: [facility]Dlvis Alberto Staff  Subject: Refill Request                                   The pt called re: a need for NICKOLAS Ahuja to please sign off on her medication   galcanezumab-gnlm (EMGALITY PEN) 120 mg/mL Pnl.   [facility] needs his signature to fill request.   Her call back is [phone].     Thanks!

## 2024-10-17 NOTE — PROGRESS NOTES
Subjective:       Patient ID: Elsa Duran is a 34 y.o. female.    Chief Complaint: Shoulder Pain, Urinary Frequency, and Health Maintenance (Pt refused care gaps )    HPI  .  Patient complains of moderate pain in his right shoulder stated he has been getting worse denies any fever chills chest pain or shortness for breath there is crepitus in the right shoulder.    Current Medications:    Current Outpatient Medications:     amLODIPine (NORVASC) 10 MG tablet, Take 1 tablet (10 mg total) by mouth once daily., Disp: 90 tablet, Rfl: 1    norethindrone (MICRONOR) 0.35 mg tablet, Take 1 tablet (0.35 mg total) by mouth once daily., Disp: 30 tablet, Rfl: 12    OLANZapine (ZYPREXA) 5 MG tablet, Take 5 mg by mouth every evening., Disp: , Rfl:     propranoloL (INDERAL) 40 MG tablet, Take 1 tablet (40 mg total) by mouth 2 (two) times daily., Disp: 60 tablet, Rfl: 11    topiramate (TOPAMAX) 100 MG tablet, Take 1 tablet (100 mg total) by mouth 2 (two) times daily., Disp: 120 tablet, Rfl: 2    ergocalciferol (ERGOCALCIFEROL) 50,000 unit Cap, Take 1 capsule (50,000 Units total) by mouth every 7 days. (Patient not taking: Reported on 10/8/2024), Disp: 4 capsule, Rfl: 2    galcanezumab-gnlm (EMGALITY PEN) 120 mg/mL PnIj, Inject 1 mL (120 mg total) into the skin every 28 days., Disp: 1 each, Rfl: 11    meloxicam (MOBIC) 15 MG tablet, Take 1 tablet (15 mg total) by mouth once daily., Disp: 30 tablet, Rfl: 2    nitrofurantoin, macrocrystal-monohydrate, (MACROBID) 100 MG capsule, Take 1 capsule (100 mg total) by mouth 2 (two) times daily. (Patient not taking: Reported on 10/8/2024), Disp: 10 capsule, Rfl: 0    oxyCODONE-acetaminophen (PERCOCET)  mg per tablet, Take 1 tablet by mouth every 6 (six) hours as needed for Pain. (Patient not taking: Reported on 10/8/2024), Disp: 28 tablet, Rfl: 0    polyethylene glycol (GLYCOLAX) 17 gram PwPk, Take 17 g by mouth once daily. (Patient not taking: Reported on 10/8/2024), Disp: 30 each,  "Rfl: 1           ROS  Twelve point system reviewed, unremarkable except for stated above in HPI.        Objective:         Vitals:    10/08/24 1039   BP: 118/84   BP Location: Right arm   Patient Position: Sitting   Pulse: 107   Resp: 17   Temp: 97.2 °F (36.2 °C)   TempSrc: Temporal   SpO2: 98%   Weight: 90.3 kg (199 lb)   Height: 5' 6" (1.676 m)        Physical Exam     Patient is awake alert oriented person place and  Lungs are clear to auscultation bilaterally no crackles or wheezes   Cardiovascular S1-S2 regular rate and rhythm no murmurs rubs or gallops   Abdomen is soft positive bowel sounds nontender, extremities no clubbing cyanosis edema  Neuro no focal neurological deficits  Skin warm and dry.     Last Labs:     No visits with results within 1 Month(s) from this visit.   Latest known visit with results is:   Office Visit on 06/19/2024   Component Date Value    POC Preg Test, Ur 06/19/2024 Negative      Acceptab* 06/19/2024 Yes     WBC, UA 06/19/2024 Negative     Nitrite, UA 06/19/2024 Negative     Urobilinogen, UA 06/19/2024 1.0     Protein, POC 06/19/2024 Negative     pH, UA 06/19/2024 8.0     Blood, UA 06/19/2024 Negative     Spec Grav UA 06/19/2024 1.025     Ketones, UA 06/19/2024 Negative     Bilirubin, POC 06/19/2024 Negative     Glucose, UA 06/19/2024 Negative     Chlamydia by PCR 06/19/2024 Negative     N. gonorrhoeae (GC) by P* 06/19/2024 Negative     Case Report 06/19/2024                      Value:Pap Cytology                                      Case: [id number]                                   Authorizing Provider:  Dunia Hill CNM       Collected:           06/19/2024 10:26 AM          Ordering Location:     [facility]   Received:            06/20/2024 09:09 AM                                 Clinic - OB/GYN                                                              First Screen:          Verena Vora CT(ASCP)                                                "        Specimen:    Liquid-Based Pap Test, Screening, Endocervix                                               Interpretation 06/19/2024 Negative     General Categorization 06/19/2024 Negative for intraepithelial lesion or malignancy     Specimen Adequacy 06/19/2024 Satisfactory for evaluation  No endocervical component     Clinical Information 06/19/2024                      Value:This result contains rich text formatting which cannot be displayed here.    Disclaimer 06/19/2024                      Value:This result contains rich text formatting which cannot be displayed here.    HPV 16 06/19/2024 Negative     HPV 18 06/19/2024 Negative     HPV Other 06/19/2024 Negative        Last Imaging:  X-ray Shoulder 2 or More Views Right  Narrative: EXAMINATION:  XR SHOULDER COMPLETE 2 OR MORE VIEWS RIGHT    CLINICAL HISTORY:  Primary osteoarthritis, right shoulder    TECHNIQUE:  Right shoulder, internal and external rotation views and Y axillary view    COMPARISON:  07/27/2023 and 08/24/2023    FINDINGS:  There is narrowing of the AC joint.  The glenohumeral joint is normal.  No soft tissue calcifications or fractures are seen.  Impression: There is mild osteoarthritis involving the AC joint.    Place of service: [facility]    Electronically signed by: Allie Gallegos  Date:    10/09/2024  Time:    07:55         **Labs and x-rays personally reviewed by me    ** reviewed           Assessment & Plan:       1. Osteoarthritis of right shoulder, unspecified osteoarthritis type  -     ketorolac injection 15 mg  -     X-ray Shoulder 2 or More Views Right; Future; Expected date: 10/08/2024  -     meloxicam (MOBIC) 15 MG tablet; Take 1 tablet (15 mg total) by mouth once daily.  Dispense: 30 tablet; Refill: 2            Brett Dias MD

## 2024-10-21 ENCOUNTER — OFFICE VISIT (OUTPATIENT)
Dept: OBSTETRICS AND GYNECOLOGY | Facility: CLINIC | Age: 34
End: 2024-10-21
Payer: MEDICAID

## 2024-10-21 VITALS
SYSTOLIC BLOOD PRESSURE: 123 MMHG | OXYGEN SATURATION: 99 % | WEIGHT: 198.38 LBS | BODY MASS INDEX: 31.88 KG/M2 | HEIGHT: 66 IN | RESPIRATION RATE: 18 BRPM | DIASTOLIC BLOOD PRESSURE: 85 MMHG | HEART RATE: 106 BPM | TEMPERATURE: 99 F

## 2024-10-21 DIAGNOSIS — Z11.3 SCREEN FOR SEXUALLY TRANSMITTED DISEASES: ICD-10-CM

## 2024-10-21 DIAGNOSIS — Z11.4 SCREENING FOR HIV (HUMAN IMMUNODEFICIENCY VIRUS): ICD-10-CM

## 2024-10-21 DIAGNOSIS — Z20.2 ENCOUNTER FOR ASSESSMENT OF STD EXPOSURE: ICD-10-CM

## 2024-10-21 DIAGNOSIS — N76.0 BV (BACTERIAL VAGINOSIS): ICD-10-CM

## 2024-10-21 DIAGNOSIS — Z30.09 GENERAL COUNSELING AND ADVICE ON CONTRACEPTIVE MANAGEMENT: Primary | ICD-10-CM

## 2024-10-21 DIAGNOSIS — B96.89 BV (BACTERIAL VAGINOSIS): ICD-10-CM

## 2024-10-21 DIAGNOSIS — R30.0 DYSURIA: ICD-10-CM

## 2024-10-21 DIAGNOSIS — Z72.51 HIGH RISK HETEROSEXUAL BEHAVIOR: ICD-10-CM

## 2024-10-21 LAB
BILIRUB SERPL-MCNC: NEGATIVE MG/DL
BLOOD, POC UA: NEGATIVE
CANDIDA SPECIES: NEGATIVE
GARDNERELLA: POSITIVE
GLUCOSE UR QL STRIP: NEGATIVE
HAV IGM SER QL: NORMAL
HBV CORE IGM SER QL: NORMAL
HBV SURFACE AG SERPL QL IA: NORMAL
HCV AB SER QL: NORMAL
HIV 1+O+2 AB SERPL QL: NORMAL
KETONES UR QL STRIP: NEGATIVE
LEUKOCYTE ESTERASE URINE, POC: NEGATIVE
NITRITE, POC UA: NEGATIVE
PH, POC UA: 7
PROTEIN, POC: NEGATIVE
SPECIFIC GRAVITY, POC UA: 1.01
SYPHILIS AB INTERPRETATION: NORMAL
TRICHOMONAS: NEGATIVE
UROBILINOGEN, POC UA: 0.2

## 2024-10-21 PROCEDURE — 80074 ACUTE HEPATITIS PANEL: CPT | Mod: ,,, | Performed by: CLINICAL MEDICAL LABORATORY

## 2024-10-21 PROCEDURE — 99213 OFFICE O/P EST LOW 20 MIN: CPT | Mod: ,,, | Performed by: ADVANCED PRACTICE MIDWIFE

## 2024-10-21 PROCEDURE — 87591 N.GONORRHOEAE DNA AMP PROB: CPT | Mod: ,,, | Performed by: CLINICAL MEDICAL LABORATORY

## 2024-10-21 PROCEDURE — 3079F DIAST BP 80-89 MM HG: CPT | Mod: CPTII,,, | Performed by: ADVANCED PRACTICE MIDWIFE

## 2024-10-21 PROCEDURE — 87660 TRICHOMONAS VAGIN DIR PROBE: CPT | Mod: ,,, | Performed by: CLINICAL MEDICAL LABORATORY

## 2024-10-21 PROCEDURE — 81003 URINALYSIS AUTO W/O SCOPE: CPT | Mod: QW,,, | Performed by: ADVANCED PRACTICE MIDWIFE

## 2024-10-21 PROCEDURE — 86780 TREPONEMA PALLIDUM: CPT | Mod: ,,, | Performed by: CLINICAL MEDICAL LABORATORY

## 2024-10-21 PROCEDURE — 3074F SYST BP LT 130 MM HG: CPT | Mod: CPTII,,, | Performed by: ADVANCED PRACTICE MIDWIFE

## 2024-10-21 PROCEDURE — 87491 CHLMYD TRACH DNA AMP PROBE: CPT | Mod: ,,, | Performed by: CLINICAL MEDICAL LABORATORY

## 2024-10-21 PROCEDURE — 36415 COLL VENOUS BLD VENIPUNCTURE: CPT | Mod: ,,, | Performed by: ADVANCED PRACTICE MIDWIFE

## 2024-10-21 PROCEDURE — 86696 HERPES SIMPLEX TYPE 2 TEST: CPT | Mod: ,,, | Performed by: CLINICAL MEDICAL LABORATORY

## 2024-10-21 PROCEDURE — 87510 GARDNER VAG DNA DIR PROBE: CPT | Mod: ,,, | Performed by: CLINICAL MEDICAL LABORATORY

## 2024-10-21 PROCEDURE — 3008F BODY MASS INDEX DOCD: CPT | Mod: CPTII,,, | Performed by: ADVANCED PRACTICE MIDWIFE

## 2024-10-21 PROCEDURE — 87389 HIV-1 AG W/HIV-1&-2 AB AG IA: CPT | Mod: ,,, | Performed by: CLINICAL MEDICAL LABORATORY

## 2024-10-21 PROCEDURE — 87480 CANDIDA DNA DIR PROBE: CPT | Mod: ,,, | Performed by: CLINICAL MEDICAL LABORATORY

## 2024-10-21 PROCEDURE — 86695 HERPES SIMPLEX TYPE 1 TEST: CPT | Mod: ,,, | Performed by: CLINICAL MEDICAL LABORATORY

## 2024-10-21 PROCEDURE — 1159F MED LIST DOCD IN RCRD: CPT | Mod: CPTII,,, | Performed by: ADVANCED PRACTICE MIDWIFE

## 2024-10-21 RX ORDER — METRONIDAZOLE 500 MG/1
500 TABLET ORAL 2 TIMES DAILY
Qty: 14 TABLET | Refills: 0 | Status: SHIPPED | OUTPATIENT
Start: 2024-10-21 | End: 2024-10-28

## 2024-10-21 NOTE — PROGRESS NOTES
Subjective     Patient ID: Elsa Duran is a 34 y.o. female.    Chief Complaint: Contraception and STD CHECK (Per patient request )    Here with c/o vaginal discharge.  has had urinary frequency with burning.  has not had bowel movement in over a week. She indicates she does not drink a lot of water.      feels depressed and denies any desires for harm currently.  is seeing a therapist and is taking medications for depression currently.           Review of Systems   All other systems reviewed and are negative.         Objective     Physical Exam  Vitals reviewed. Exam conducted with a chaperone present.   Constitutional:       Appearance: Normal appearance.   Pulmonary:      Effort: Pulmonary effort is normal.   Genitourinary:     Exam position: Lithotomy position.      Vagina: Vaginal discharge present.      Cervix: Discharge present.      Comments: Thin white discharge  Skin:     General: Skin is warm and dry.   Neurological:      General: No focal deficit present.      Mental Status: She is alert and oriented to person, place, and time.   Psychiatric:         Mood and Affect: Mood normal.         Behavior: Behavior normal.         Thought Content: Thought content normal.         Judgment: Judgment normal.            Assessment and Plan     1. General counseling and advice on contraceptive management  -     Cancel: POCT urine pregnancy    2. Screen for sexually transmitted diseases  -     Chlamydia/GC, PCR; Future; Expected date: 10/21/2024  -     Hepatitis Panel, Acute; Future; Expected date: 10/21/2024  -     Bacterial Vaginosis; Future; Expected date: 10/21/2024  -     Syphilis Antibody with reflex to RPR; Future; Expected date: 10/21/2024    3. High risk heterosexual behavior  -     Chlamydia/GC, PCR; Future; Expected date: 10/21/2024  -     HIV 1/2 Ag/Ab (4th Gen); Future; Expected date: 10/21/2024  -     Hepatitis Panel, Acute; Future; Expected date: 10/21/2024  -     Bacterial  Vaginosis; Future; Expected date: 10/21/2024  -     Syphilis Antibody with reflex to RPR; Future; Expected date: 10/21/2024  -     HSV 1 & 2, IgG; Future; Expected date: 10/21/2024    4. Screening for HIV (human immunodeficiency virus)  -     HIV 1/2 Ag/Ab (4th Gen); Future; Expected date: 10/21/2024    5. Encounter for assessment of STD exposure    6. Dysuria  -     POCT Urinalysis        F/u 1 week for lab results.  Continue taking current birth control pills due to history of headaches, hypertension, and smoking  Verbalized understanding to all instructions and information  Questions answered to desired level of satisfaction           Follow up in about 1 week (around 10/28/2024), or if symptoms worsen or fail to improve, for 1 weeks lab results.

## 2024-10-22 LAB
CHLAMYDIA BY PCR: NEGATIVE
HSV TYPE 1 AB IGG INDEX: 0.3
HSV TYPE 2 AB IGG INDEX: 8.8
HSV1 IGG SER QL: NEGATIVE
HSV2 IGG SER QL: POSITIVE
N. GONORRHOEAE (GC) BY PCR: NEGATIVE

## 2024-10-28 ENCOUNTER — OFFICE VISIT (OUTPATIENT)
Dept: OBSTETRICS AND GYNECOLOGY | Facility: CLINIC | Age: 34
End: 2024-10-28
Payer: MEDICAID

## 2024-10-28 VITALS
DIASTOLIC BLOOD PRESSURE: 97 MMHG | TEMPERATURE: 98 F | HEIGHT: 66 IN | WEIGHT: 203.63 LBS | HEART RATE: 76 BPM | SYSTOLIC BLOOD PRESSURE: 143 MMHG | RESPIRATION RATE: 18 BRPM | OXYGEN SATURATION: 98 % | BODY MASS INDEX: 32.72 KG/M2

## 2024-10-28 DIAGNOSIS — J02.9 SORE THROAT: ICD-10-CM

## 2024-10-28 DIAGNOSIS — J34.89 SINUS DRAINAGE: ICD-10-CM

## 2024-10-28 DIAGNOSIS — Z09 ENCOUNTER FOR FOLLOW-UP: Primary | ICD-10-CM

## 2024-10-28 DIAGNOSIS — M54.9 BACK PAIN, UNSPECIFIED BACK LOCATION, UNSPECIFIED BACK PAIN LATERALITY, UNSPECIFIED CHRONICITY: ICD-10-CM

## 2024-10-28 LAB
CTP QC/QA: YES
CTP QC/QA: YES
POC MOLECULAR INFLUENZA A AGN: NEGATIVE
POC MOLECULAR INFLUENZA B AGN: NEGATIVE
SARS-COV-2 RDRP RESP QL NAA+PROBE: NEGATIVE

## 2024-10-28 PROCEDURE — 87502 INFLUENZA DNA AMP PROBE: CPT | Mod: ,,, | Performed by: ADVANCED PRACTICE MIDWIFE

## 2024-10-28 PROCEDURE — 3008F BODY MASS INDEX DOCD: CPT | Mod: CPTII,,, | Performed by: ADVANCED PRACTICE MIDWIFE

## 2024-10-28 PROCEDURE — 99213 OFFICE O/P EST LOW 20 MIN: CPT | Mod: ,,, | Performed by: ADVANCED PRACTICE MIDWIFE

## 2024-10-28 PROCEDURE — 1159F MED LIST DOCD IN RCRD: CPT | Mod: CPTII,,, | Performed by: ADVANCED PRACTICE MIDWIFE

## 2024-10-28 PROCEDURE — 3080F DIAST BP >= 90 MM HG: CPT | Mod: CPTII,,, | Performed by: ADVANCED PRACTICE MIDWIFE

## 2024-10-28 PROCEDURE — 87635 SARS-COV-2 COVID-19 AMP PRB: CPT | Mod: ,,, | Performed by: ADVANCED PRACTICE MIDWIFE

## 2024-10-28 PROCEDURE — 3077F SYST BP >= 140 MM HG: CPT | Mod: CPTII,,, | Performed by: ADVANCED PRACTICE MIDWIFE

## 2024-12-17 ENCOUNTER — OFFICE VISIT (OUTPATIENT)
Dept: FAMILY MEDICINE | Facility: CLINIC | Age: 34
End: 2024-12-17
Payer: MEDICAID

## 2024-12-17 VITALS
HEART RATE: 89 BPM | OXYGEN SATURATION: 100 % | WEIGHT: 206 LBS | BODY MASS INDEX: 33.11 KG/M2 | HEIGHT: 66 IN | RESPIRATION RATE: 18 BRPM | DIASTOLIC BLOOD PRESSURE: 97 MMHG | TEMPERATURE: 98 F | SYSTOLIC BLOOD PRESSURE: 156 MMHG

## 2024-12-17 DIAGNOSIS — R05.9 COUGH, UNSPECIFIED TYPE: ICD-10-CM

## 2024-12-17 DIAGNOSIS — M19.011 OSTEOARTHRITIS OF RIGHT SHOULDER, UNSPECIFIED OSTEOARTHRITIS TYPE: Primary | ICD-10-CM

## 2024-12-17 LAB
CTP QC/QA: YES
MOLECULAR STREP A: NEGATIVE
POC MOLECULAR INFLUENZA A AGN: NEGATIVE
POC MOLECULAR INFLUENZA B AGN: NEGATIVE
SARS-COV-2 RDRP RESP QL NAA+PROBE: NEGATIVE

## 2024-12-17 PROCEDURE — 3080F DIAST BP >= 90 MM HG: CPT | Mod: CPTII,,, | Performed by: INTERNAL MEDICINE

## 2024-12-17 PROCEDURE — 87635 SARS-COV-2 COVID-19 AMP PRB: CPT | Mod: RHCUB | Performed by: INTERNAL MEDICINE

## 2024-12-17 PROCEDURE — 87651 STREP A DNA AMP PROBE: CPT | Mod: RHCUB | Performed by: INTERNAL MEDICINE

## 2024-12-17 PROCEDURE — 96372 THER/PROPH/DIAG INJ SC/IM: CPT | Mod: ,,, | Performed by: INTERNAL MEDICINE

## 2024-12-17 PROCEDURE — 99214 OFFICE O/P EST MOD 30 MIN: CPT | Mod: 25,,, | Performed by: INTERNAL MEDICINE

## 2024-12-17 PROCEDURE — 87502 INFLUENZA DNA AMP PROBE: CPT | Mod: RHCUB | Performed by: INTERNAL MEDICINE

## 2024-12-17 PROCEDURE — 1159F MED LIST DOCD IN RCRD: CPT | Mod: CPTII,,, | Performed by: INTERNAL MEDICINE

## 2024-12-17 PROCEDURE — 3008F BODY MASS INDEX DOCD: CPT | Mod: CPTII,,, | Performed by: INTERNAL MEDICINE

## 2024-12-17 PROCEDURE — 3077F SYST BP >= 140 MM HG: CPT | Mod: CPTII,,, | Performed by: INTERNAL MEDICINE

## 2024-12-17 RX ORDER — MENTHOL AND ZINC OXIDE .44; 20.625 G/100G; G/100G
OINTMENT TOPICAL 2 TIMES DAILY
Qty: 113 G | Refills: 1 | Status: SHIPPED | OUTPATIENT
Start: 2024-12-17

## 2024-12-17 RX ORDER — AMLODIPINE BESYLATE 10 MG/1
10 TABLET ORAL DAILY
Qty: 90 TABLET | Refills: 1 | Status: SHIPPED | OUTPATIENT
Start: 2024-12-17

## 2024-12-17 RX ORDER — ARIPIPRAZOLE 5 MG/1
5 TABLET ORAL NIGHTLY
COMMUNITY
Start: 2024-12-02

## 2024-12-17 RX ORDER — GABAPENTIN 100 MG/1
100 CAPSULE ORAL NIGHTLY PRN
Qty: 90 CAPSULE | Refills: 0 | Status: SHIPPED | OUTPATIENT
Start: 2024-12-17

## 2024-12-17 RX ORDER — DICLOFENAC SODIUM 10 MG/G
2 GEL TOPICAL 2 TIMES DAILY
Qty: 200 G | Refills: 3 | Status: SHIPPED | OUTPATIENT
Start: 2024-12-17

## 2024-12-17 RX ORDER — KETOROLAC TROMETHAMINE 30 MG/ML
15 INJECTION, SOLUTION INTRAMUSCULAR; INTRAVENOUS
Status: COMPLETED | OUTPATIENT
Start: 2024-12-17 | End: 2024-12-17

## 2024-12-17 RX ORDER — VALACYCLOVIR HYDROCHLORIDE 500 MG/1
500 TABLET, FILM COATED ORAL 3 TIMES DAILY
Qty: 15 TABLET | Refills: 1 | Status: SHIPPED | OUTPATIENT
Start: 2024-12-17

## 2024-12-17 RX ADMIN — KETOROLAC TROMETHAMINE 15 MG: 30 INJECTION, SOLUTION INTRAMUSCULAR; INTRAVENOUS at 11:12

## 2024-12-17 NOTE — LETTER
December 17, 2024      [facility]  [address] [place] [address] [place] [address]  Phone: [phone]  Fax: [phone]       Patient: Elsa Duran   YOB: 1990  Date of Visit: 12/17/2024    To Whom It May Concern:    OLI Duran  was at [facility] on 12/17/2024. The patient may return to work/school on 12/18/24 with no restrictions. If you have any questions or concerns, or if I can be of further assistance, please do not hesitate to contact me.    Sincerely,    Dr Brett Gomez RN

## 2024-12-19 NOTE — PROGRESS NOTES
Subjective:       Patient ID: Elsa Duran is a 34 y.o. female.    Chief Complaint: Vaginal Pain    History of Present Illness              Current Medications:    Current Outpatient Medications:     ARIPiprazole (ABILIFY) 5 MG Tab, Take 5 mg by mouth every evening., Disp: , Rfl:     galcanezumab-gnlm (EMGALITY PEN) 120 mg/mL PnIj, Inject 1 mL (120 mg total) into the skin every 28 days., Disp: 1 each, Rfl: 11    meloxicam (MOBIC) 15 MG tablet, Take 1 tablet (15 mg total) by mouth once daily., Disp: 30 tablet, Rfl: 2    norethindrone (MICRONOR) 0.35 mg tablet, Take 1 tablet (0.35 mg total) by mouth once daily., Disp: 30 tablet, Rfl: 12    OLANZapine (ZYPREXA) 5 MG tablet, Take 5 mg by mouth every evening., Disp: , Rfl:     propranoloL (INDERAL) 40 MG tablet, Take 1 tablet (40 mg total) by mouth 2 (two) times daily., Disp: 60 tablet, Rfl: 11    topiramate (TOPAMAX) 100 MG tablet, Take 1 tablet (100 mg total) by mouth 2 (two) times daily., Disp: 120 tablet, Rfl: 2    amLODIPine (NORVASC) 10 MG tablet, Take 1 tablet (10 mg total) by mouth once daily., Disp: 90 tablet, Rfl: 1    diclofenac sodium (VOLTAREN ARTHRITIS PAIN) 1 % Gel, Apply 2 g topically 2 (two) times daily., Disp: 200 g, Rfl: 3    gabapentin (NEURONTIN) 100 MG capsule, Take 1 capsule (100 mg total) by mouth nightly as needed (for pain)., Disp: 90 capsule, Rfl: 0    menthol-zinc oxide (CALMOSEPTINE) 0.44-20.6 % Oint, Apply topically 2 (two) times a day., Disp: 113 g, Rfl: 1    valACYclovir (VALTREX) 500 MG tablet, Take 1 tablet (500 mg total) by mouth 3 (three) times daily., Disp: 15 tablet, Rfl: 1           ROS  Twelve point system reviewed, unremarkable except for stated above in HPI.        Objective:         Vitals:    12/17/24 1022 12/17/24 1108   BP: (!) 159/98 (!) 156/97   BP Location: Right arm    Patient Position: Sitting    Pulse: 89    Resp: 18    Temp: 97.9 °F (36.6 °C)    TempSrc: Temporal    SpO2: 100%    Weight: 93.4 kg (206 lb)    Height:  "5' 6" (1.676 m)         Physical Exam     Patient is awake alert oriented person place and  Lungs are clear to auscultation bilaterally no crackles or wheezes   Cardiovascular S1-S2 regular rate and rhythm no murmurs rubs or gallops   Abdomen is soft positive bowel sounds nontender, extremities no clubbing cyanosis edema  Neuro no focal neurological deficits  Skin warm and dry.     Last Labs:     Office Visit on 12/17/2024   Component Date Value    Molecular Strep A, POC 12/17/2024 Negative      Acceptab* 12/17/2024 Yes     POC Rapid COVID 12/17/2024 Negative      Acceptab* 12/17/2024 Yes     POC Molecular Influenza * 12/17/2024 Negative     POC Molecular Influenza * 12/17/2024 Negative      Acceptab* 12/17/2024 Yes        Last Imaging:  X-ray Shoulder 2 or More Views Right  Narrative: EXAMINATION:  XR SHOULDER COMPLETE 2 OR MORE VIEWS RIGHT    CLINICAL HISTORY:  Primary osteoarthritis, right shoulder    TECHNIQUE:  Right shoulder, internal and external rotation views and Y axillary view    COMPARISON:  07/27/2023 and 08/24/2023    FINDINGS:  There is narrowing of the AC joint.  The glenohumeral joint is normal.  No soft tissue calcifications or fractures are seen.  Impression: There is mild osteoarthritis involving the AC joint.    Place of service: [facility]    Electronically signed by: Allie Gallegos  Date:    10/09/2024  Time:    07:55         **Labs and x-rays personally reviewed by me    ** reviewed           Assessment & Plan:   Assessment & Plan                1. Osteoarthritis of right shoulder, unspecified osteoarthritis type  -     ketorolac injection 15 mg  -     Ambulatory referral/consult to Orthopedics; Future; Expected date: 12/24/2024    2. Cough, unspecified type  -     POCT Strep A, Molecular  -     POCT COVID-19 Rapid Screening  -     POCT Influenza A/B Molecular    Chronic hypertension   Chronic to the perineal area   herpes simplex " virus type 2  Irritation in the perineal pelvic area  -     amLODIPine (NORVASC) 10 MG tablet; Take 1 tablet (10 mg total) by mouth once daily.  Dispense: 90 tablet; Refill: 1  -     gabapentin (NEURONTIN) 100 MG capsule; Take 1 capsule (100 mg total) by mouth nightly as needed (for pain).  Dispense: 90 capsule; Refill: 0  -     valACYclovir (VALTREX) 500 MG tablet; Take 1 tablet (500 mg total) by mouth 3 (three) times daily.  Dispense: 15 tablet; Refill: 1  -     diclofenac sodium (VOLTAREN ARTHRITIS PAIN) 1 % Gel; Apply 2 g topically 2 (two) times daily.  Dispense: 200 g; Refill: 3  -     menthol-zinc oxide (CALMOSEPTINE) 0.44-20.6 % Oint; Apply topically 2 (two) times a day.  Dispense: 113 g; Refill: 1            Brett Dias MD  This note was generated with the assistance of ambient listening technology. Verbal consent was obtained by the patient and accompanying visitor(s) for the recording of patient appointment to facilitate this note. I attest to having reviewed and edited the generated note for accuracy, though some syntax or spelling errors may persist. Please contact the author of this note for any clarification.

## 2024-12-31 ENCOUNTER — TELEPHONE (OUTPATIENT)
Dept: OBSTETRICS AND GYNECOLOGY | Facility: CLINIC | Age: 34
End: 2024-12-31
Payer: MEDICAID

## 2024-12-31 NOTE — TELEPHONE ENCOUNTER
Spoke with patient and she stated she didn't need anything now    ----- Message from Marium sent at 12/11/2024  8:12 AM CST -----  Pt is having an outbreak and needs meds. Pt wants a call back soon please.   Who Called: Elsa L Stidmon    Caller is requesting assistance/information from provider's office.    Symptoms (please be specific):    How long has patient had these symptoms:    List of preferred pharmacies on file (remove unneeded): [unfilled]  If different, enter pharmacy into here including location and phone number:         Patient's Preferred Phone Number on File: [phone]   Best Call Back Number, if different:  Additional Information:

## 2025-01-06 ENCOUNTER — OFFICE VISIT (OUTPATIENT)
Dept: ORTHOPEDICS | Facility: CLINIC | Age: 35
End: 2025-01-06
Payer: MEDICAID

## 2025-01-06 VITALS — WEIGHT: 205 LBS | BODY MASS INDEX: 32.95 KG/M2 | HEIGHT: 66 IN

## 2025-01-06 DIAGNOSIS — M19.011 OSTEOARTHRITIS OF RIGHT SHOULDER, UNSPECIFIED OSTEOARTHRITIS TYPE: ICD-10-CM

## 2025-01-06 PROCEDURE — 1159F MED LIST DOCD IN RCRD: CPT | Mod: CPTII,,,

## 2025-01-06 PROCEDURE — 99999 PR PBB SHADOW E&M-EST. PATIENT-LVL IV: CPT | Mod: PBBFAC,,,

## 2025-01-06 PROCEDURE — 99203 OFFICE O/P NEW LOW 30 MIN: CPT | Mod: S$PBB,25,,

## 2025-01-06 PROCEDURE — 99214 OFFICE O/P EST MOD 30 MIN: CPT | Mod: PBBFAC

## 2025-01-06 PROCEDURE — 3008F BODY MASS INDEX DOCD: CPT | Mod: CPTII,,,

## 2025-01-06 PROCEDURE — 20605 DRAIN/INJ JOINT/BURSA W/O US: CPT | Mod: PBBFAC,RT

## 2025-01-06 PROCEDURE — 99999PBSHW PR PBB SHADOW TECHNICAL ONLY FILED TO HB: Mod: PBBFAC,,,

## 2025-01-06 RX ORDER — TRIAMCINOLONE ACETONIDE 40 MG/ML
40 INJECTION, SUSPENSION INTRA-ARTICULAR; INTRAMUSCULAR
Status: DISCONTINUED | OUTPATIENT
Start: 2025-01-06 | End: 2025-01-06 | Stop reason: HOSPADM

## 2025-01-06 RX ORDER — BUPIVACAINE HYDROCHLORIDE 2.5 MG/ML
1 INJECTION, SOLUTION EPIDURAL; INFILTRATION; INTRACAUDAL
Status: DISCONTINUED | OUTPATIENT
Start: 2025-01-06 | End: 2025-01-06 | Stop reason: HOSPADM

## 2025-01-06 RX ORDER — VALACYCLOVIR HYDROCHLORIDE 500 MG/1
500 TABLET, FILM COATED ORAL 3 TIMES DAILY
Qty: 15 TABLET | Refills: 1 | Status: SHIPPED | OUTPATIENT
Start: 2025-01-06

## 2025-01-06 RX ADMIN — TRIAMCINOLONE ACETONIDE 40 MG: 40 INJECTION, SUSPENSION INTRA-ARTICULAR; INTRAMUSCULAR at 08:01

## 2025-01-06 RX ADMIN — BUPIVACAINE HYDROCHLORIDE 1 ML: 2.5 INJECTION, SOLUTION EPIDURAL; INFILTRATION; INTRACAUDAL at 08:01

## 2025-01-06 NOTE — PROCEDURES
Intermediate Joint Aspiration/Injection: R acromioclavicular    Date/Time: 1/6/2025 8:40 AM    Performed by: Amairani Calvillo PA  Authorized by: Amairani Calvillo PA    Consent Done?:  Yes (Verbal)  Indications:  Arthritis and pain    Location:  Shoulder  Site:  R acromioclavicular  Needle size:  22 G  Approach:  Posterolateral  Medications:  1 mL BUPivacaine (PF) 0.25% (2.5 mg/ml) 0.25 % (2.5 mg/mL); 40 mg triamcinolone acetonide 40 mg/mL  Patient tolerance:  Patient tolerated the procedure well with no immediate complications

## 2025-01-06 NOTE — PROGRESS NOTES
CC:   Chief Complaint   Patient presents with    Shoulder Pain     Patient is being seen for right shoulder pain. Patient denies having any injuries or falls. No recent surgeries. Patient reports sharp pains and throbbing for 1 yr now . No other complaints today.      HPI     Shoulder Pain     Additional comments: Patient is being seen for right shoulder pain.   Patient denies having any injuries or falls. No recent surgeries. Patient   reports sharp pains and throbbing for 1 yr now . No other complaints   today.          Last edited by Samara Benavides CMA on 1/6/2025  8:41 AM.        Elsa Duran is a 34 y.o. female seen today for Shoulder Pain (Patient is being seen for right shoulder pain. Patient denies having any injuries or falls. No recent surgeries. Patient reports sharp pains and throbbing for 1 yr now . No other complaints today.)  Patient presents today with complaints of chronic right shoulder pain for greater than 1 year.  Patient reports she previously worked at a [employer] that is when the pain began in her right shoulder.  She denies any fall or injury the right shoulder.  She has previously seen by Dr. Dias who was treating her with anti-inflammatory medications and Toradol injection at last visit on 12/17/2024.  Patient reports minimal relief following the Toradol shot.  She reports pain is mostly located along the top of the right shoulder.  Hurts worse with the over the head movements.  No other complaints today.        PAST MEDICAL HISTORY:   Past Medical History:   Diagnosis Date    History of gonorrhea     Hypertension     Migraine without aura, intractable, without status migrainosus     Noncompliance with medication regimen     Syncope           PAST SURGICAL HISTORY:   Past Surgical History:   Procedure Laterality Date    Abdominal Wall Mass Surgery  07/12/2019    CARPAL TUNNEL RELEASE Right 12/21/2023    Procedure: RELEASE, CARPAL TUNNEL;  Surgeon: Sameer Kingsley  MD;  Location: [facility];  Service: Orthopedics;  Laterality: Right;     SECTION  08/10/2008     SECTION  01/15/2013    DILATION AND CURETTAGE OF UTERUS  2012          ALLERGIES: Review of patient's allergies indicates:  No Known Allergies     MEDICATIONS :    Current Outpatient Medications:     amLODIPine (NORVASC) 10 MG tablet, Take 1 tablet (10 mg total) by mouth once daily., Disp: 90 tablet, Rfl: 1    ARIPiprazole (ABILIFY) 5 MG Tab, Take 5 mg by mouth every evening., Disp: , Rfl:     diclofenac sodium (VOLTAREN ARTHRITIS PAIN) 1 % Gel, Apply 2 g topically 2 (two) times daily., Disp: 200 g, Rfl: 3    gabapentin (NEURONTIN) 100 MG capsule, Take 1 capsule (100 mg total) by mouth nightly as needed (for pain)., Disp: 90 capsule, Rfl: 0    galcanezumab-gnlm (EMGALITY PEN) 120 mg/mL PnIj, Inject 1 mL (120 mg total) into the skin every 28 days., Disp: 1 each, Rfl: 11    meloxicam (MOBIC) 15 MG tablet, Take 1 tablet (15 mg total) by mouth once daily., Disp: 30 tablet, Rfl: 2    menthol-zinc oxide (CALMOSEPTINE) 0.44-20.6 % Oint, Apply topically 2 (two) times a day., Disp: 113 g, Rfl: 1    norethindrone (MICRONOR) 0.35 mg tablet, Take 1 tablet (0.35 mg total) by mouth once daily., Disp: 30 tablet, Rfl: 12    OLANZapine (ZYPREXA) 5 MG tablet, Take 5 mg by mouth every evening., Disp: , Rfl:     propranoloL (INDERAL) 40 MG tablet, Take 1 tablet (40 mg total) by mouth 2 (two) times daily., Disp: 60 tablet, Rfl: 11    topiramate (TOPAMAX) 100 MG tablet, Take 1 tablet (100 mg total) by mouth 2 (two) times daily., Disp: 120 tablet, Rfl: 2    valACYclovir (VALTREX) 500 MG tablet, TAKE ONE TABLET BY MOUTH THREE TIMES DAILY, Disp: 15 tablet, Rfl: 1     SOCIAL HISTORY:   Social History     Socioeconomic History    Marital status: Single   Tobacco Use    Smoking status: Every Day     Current packs/day: 1.00     Average packs/day: 1 pack/day for 19.0 years (19.0 ttl pk-yrs)     Types: Cigarettes      Start date: 2006     Passive exposure: Current    Smokeless tobacco: Never   Substance and Sexual Activity    Alcohol use: Never    Drug use: Never    Sexual activity: Yes     Partners: Male     Social Drivers of Health     Financial Resource Strain: High Risk (4/23/2024)    Overall Financial Resource Strain (CARDIA)     Difficulty of Paying Living Expenses: Very hard   Food Insecurity: Food Insecurity Present (4/23/2024)    Hunger Vital Sign     Worried About Running Out of Food in the Last Year: Often true     Ran Out of Food in the Last Year: Sometimes true   Transportation Needs: Unmet Transportation Needs (4/23/2024)    PRAPARE - Transportation     Lack of Transportation (Medical): Yes     Lack of Transportation (Non-Medical): Yes   Physical Activity: Inactive (4/23/2024)    Exercise Vital Sign     Days of Exercise per Week: 0 days     Minutes of Exercise per Session: 0 min   Stress: Stress Concern Present (4/23/2024)    [employer] [place] [employer] - Occupational Stress Questionnaire     Feeling of Stress : Rather much   Housing Stability: Unknown (4/23/2024)    Housing Stability Vital Sign     Unable to Pay for Housing in the Last Year: Patient declined        FAMILY HISTORY:   Family History   Problem Relation Name Age of Onset    Hypertension Mother      Hypertension Father      No Known Problems Brother      No Known Problems Sister      No Known Problems Sister      No Known Problems Sister            PHYSICAL EXAM:      There were no vitals filed for this visit.  Body mass index is 33.09 kg/m².    GENERAL: Well-developed, well-nourished female . The patient is alert, oriented and cooperative.    HEENT:  Normocephalic, atraumatic.  Extraocular movements are intact bilaterally.     NECK:  Nontender with good range of motion.    LUNGS:  Clear to auscultation bilaterally.    HEART:  Regular rate and rhythm.     ABDOMEN:  Soft, non-tender, non-distended.      EXTREMITIES:  Right shoulder was skin  clean dry and intact, tenderness to palpation over AC joint, forward flexion to 180°, abduction to 90°, with some pain with adduction, positive Madrigal Yovany testing,  positive empty can testing, negative labral shear testing, neurovascularly intact    RADIOGRAPHIC FINDINGS:   No results found.     Patient Active Problem List    Diagnosis Date Noted    Class 1 obesity with body mass index (BMI) of 33.0 to 33.9 in adult 05/14/2024    Vitamin D deficiency 12/04/2023    Bilateral carpal tunnel syndrome 11/13/2023    Osteoarthritis of right shoulder 10/03/2023    Right shoulder pain 08/28/2023    Syncope 08/09/2023    Need for hepatitis C screening test 08/09/2023    Pap smear for cervical cancer screening 08/09/2023    Abdominal pain 08/09/2023    Cervical radiculopathy 08/07/2023    Preexisting hypertension complicating pregnancy, antepartum, third trimester 06/17/2022    Chronic hypertension with superimposed preeclampsia 06/17/2022    Chronic migraine without aura without status migrainosus, not intractable 09/02/2021    Hypertension 09/02/2021    Non-compliance 09/02/2021    Nonintractable headache 09/02/2021     IMPRESSION AND PLAN:  Chronic right shoulder pain.  Possible impingement syndrome.  Previous x-rays from October of this year with mild arthritis involving the AC joint, no acute fracture or dislocation.  Discussed all treatment options with the patient today.  Discussed possible subacromial steroid injection, see procedural note for details.  Discussed with the patient that these injections can be repeated every 3-4 months as needed.  Also discussed physical therapy, orders placed today.  Follow up in 4-6 weeks after completion of PT if pain continues, otherwise PRN.     No follow-ups on file.       Amairani Calvillo PA-C      (Subject to voice recognition error, transcription service not allowed)

## 2025-01-12 ENCOUNTER — HOSPITAL ENCOUNTER (EMERGENCY)
Facility: HOSPITAL | Age: 35
Discharge: HOME | End: 2025-01-12
Payer: MEDICAID

## 2025-01-12 VITALS
BODY MASS INDEX: 32.78 KG/M2 | HEIGHT: 66 IN | SYSTOLIC BLOOD PRESSURE: 134 MMHG | TEMPERATURE: 99 F | HEART RATE: 78 BPM | RESPIRATION RATE: 16 BRPM | WEIGHT: 204 LBS | DIASTOLIC BLOOD PRESSURE: 86 MMHG | OXYGEN SATURATION: 100 %

## 2025-01-12 DIAGNOSIS — B34.9 VIRAL ILLNESS: Primary | ICD-10-CM

## 2025-01-12 LAB
GROUP A STREP MOLECULAR (OHS): NEGATIVE
INFLUENZA A MOLECULAR (OHS): NEGATIVE
INFLUENZA B MOLECULAR (OHS): NEGATIVE
SARS-COV-2 RDRP RESP QL NAA+PROBE: NEGATIVE

## 2025-01-12 PROCEDURE — 87635 SARS-COV-2 COVID-19 AMP PRB: CPT | Performed by: FAMILY MEDICINE

## 2025-01-12 PROCEDURE — 87651 STREP A DNA AMP PROBE: CPT | Performed by: FAMILY MEDICINE

## 2025-01-12 PROCEDURE — 99282 EMERGENCY DEPT VISIT SF MDM: CPT

## 2025-01-12 PROCEDURE — 87502 INFLUENZA DNA AMP PROBE: CPT | Performed by: FAMILY MEDICINE

## 2025-01-12 NOTE — Clinical Note
"Elsa"LUIS Duran was seen and treated in our emergency department on 1/12/2025.  She may return to work on 01/14/2025.       If you have any questions or concerns, please don't hesitate to call.      Rebeca Gomez, PAULETTEP"

## 2025-01-13 NOTE — DISCHARGE INSTRUCTIONS
Drink plenty of fluids.  Tylenol/Motrin as needed as directed.  Follow up with primary care provider in 2-3 days if no better.  Return to ER for new or worsening symptoms.

## 2025-01-13 NOTE — ED PROVIDER NOTES
Encounter Date: 2025       History     Chief Complaint   Patient presents with    Sore Throat    Generalized Body Aches    Headache     Pt presents to ED via POV with c/o body aches 7 sore throat x3days and headache.      Patient is a 34-year-old  female who presents to emergency department with a 3 day history of body aches, sore throat, headache, cough, diarrhea.      Review of patient's allergies indicates:  No Known Allergies  Past Medical History:   Diagnosis Date    History of gonorrhea     Hypertension     Migraine without aura, intractable, without status migrainosus     Noncompliance with medication regimen     Syncope      Past Surgical History:   Procedure Laterality Date    Abdominal Wall Mass Surgery  2019    CARPAL TUNNEL RELEASE Right 2023    Procedure: RELEASE, CARPAL TUNNEL;  Surgeon: Sameer Kingsley MD;  Location: [facility];  Service: Orthopedics;  Laterality: Right;     SECTION  08/10/2008     SECTION  01/15/2013    DILATION AND CURETTAGE OF UTERUS  2012     Family History   Problem Relation Name Age of Onset    Hypertension Mother      Hypertension Father      No Known Problems Brother      No Known Problems Sister      No Known Problems Sister      No Known Problems Sister       Social History     Tobacco Use    Smoking status: Every Day     Current packs/day: 1.00     Average packs/day: 1 pack/day for 19.0 years (19.0 ttl pk-yrs)     Types: Cigarettes     Start date:      Passive exposure: Current    Smokeless tobacco: Never   Substance Use Topics    Alcohol use: Never    Drug use: Never     Review of Systems   All other systems reviewed and are negative.      Physical Exam     Initial Vitals [25 1723]   BP Pulse Resp Temp SpO2   134/86 78 16 98.8 °F (37.1 °C) 100 %      MAP       --         Physical Exam    Constitutional: She appears well-developed and well-nourished. She is not diaphoretic. No distress.   HENT:    Head: Normocephalic and atraumatic.   Eyes: Pupils are equal, round, and reactive to light.   Neck: Neck supple.   Cardiovascular:  Normal rate.           Pulmonary/Chest: Breath sounds normal. No respiratory distress.   Abdominal: Abdomen is soft. She exhibits no distension. There is no abdominal tenderness. There is no rebound and no guarding.   Musculoskeletal:      Cervical back: Neck supple.     Neurological: She is alert and oriented to person, place, and time. GCS score is 15. GCS eye subscore is 4. GCS verbal subscore is 5. GCS motor subscore is 6.   Skin: Skin is warm and dry.   Psychiatric: She has a normal mood and affect. Her behavior is normal. Judgment and thought content normal.         Medical Screening Exam   See Full Note    ED Course   Procedures  Labs Reviewed   INFLUENZA A & B BY MOLECULAR - Normal       Result Value    INFLUENZA A MOLECULAR Negative      INFLUENZA B MOLECULAR  Negative     SARS-COV-2 RNA AMPLIFICATION, QUAL - Normal    SARS COV-2 Molecular Negative      Narrative:     Negative SARS-CoV results should not be used as the sole basis for treatment or patient management decisions; negative results should be considered in the context of a patient's recent exposures, history and the presene of clinical signs and symptoms consistent with COVID-19.  Negative results should be treated as presumptive and confirmed by molecular assay, if necessary for patient management.   STREP A BY MOLECULAR METHOD - Normal    Group A Strep Molecular Negative            Imaging Results    None          Medications - No data to display  Medical Decision Making  Patient is a 34-year-old  female who presents to emergency department with a 3 day history of body aches, sore throat, headache, cough, diarrhea. Swabs negative.                                      Clinical Impression:   Final diagnoses:  [B34.9] Viral illness (Primary)        ED Disposition Condition    Discharge Stable         Drink plenty of fluids.  Tylenol/Motrin as needed as directed.  Follow up with primary care provider in 2-3 days if no better.  Return to ER for new or worsening symptoms.  ED Prescriptions    None       Follow-up Information       Follow up With Specialties Details Why Contact Info    Primary Care Provider  Call in 2 days      [facility] - Emergency Department Emergency Medicine  As needed, If symptoms worsen [address] [place] [id number] [phone]             Rebeca Gomez, FNP  01/12/25 1957

## 2025-01-21 ENCOUNTER — HOSPITAL ENCOUNTER (EMERGENCY)
Facility: HOSPITAL | Age: 35
Discharge: HOME | End: 2025-01-21
Payer: MEDICAID

## 2025-01-21 VITALS
HEIGHT: 66 IN | HEART RATE: 108 BPM | TEMPERATURE: 100 F | RESPIRATION RATE: 18 BRPM | DIASTOLIC BLOOD PRESSURE: 87 MMHG | OXYGEN SATURATION: 99 % | BODY MASS INDEX: 32.95 KG/M2 | WEIGHT: 205 LBS | SYSTOLIC BLOOD PRESSURE: 135 MMHG

## 2025-01-21 DIAGNOSIS — J10.1 INFLUENZA A: Primary | ICD-10-CM

## 2025-01-21 LAB
INFLUENZA A MOLECULAR (OHS): POSITIVE
INFLUENZA B MOLECULAR (OHS): NEGATIVE
SARS-COV-2 RDRP RESP QL NAA+PROBE: NEGATIVE

## 2025-01-21 PROCEDURE — 87502 INFLUENZA DNA AMP PROBE: CPT | Performed by: NURSE PRACTITIONER

## 2025-01-21 PROCEDURE — 87635 SARS-COV-2 COVID-19 AMP PRB: CPT | Performed by: NURSE PRACTITIONER

## 2025-01-21 PROCEDURE — 99282 EMERGENCY DEPT VISIT SF MDM: CPT

## 2025-01-21 NOTE — Clinical Note
"Elsa Hernandez (ERICA)aaron was seen and treated in our emergency department on 1/21/2025.  She may return to work on 01/24/2025.       If you have any questions or concerns, please don't hesitate to call.      Mary ROBERTS    "

## 2025-01-21 NOTE — DISCHARGE INSTRUCTIONS
Tylenol and Motrin as needed for fever.  Drink plenty of fluids.  Follow up with primary care provider in 2-3 days if no better.  Return to the ER for new or worsening symptoms.

## 2025-01-21 NOTE — ED PROVIDER NOTES
Encounter Date: 2025       History     Chief Complaint   Patient presents with    Generalized Body Aches     Pt presents to ed with c/o chills, body aches and sore throat that started yesterday      Patient is a 34-year-old  female who presents to the emergency department with complaint of chills, body aches, sore throat, cough, runny nose, congestion, nausea x1 day.      Review of patient's allergies indicates:  No Known Allergies  Past Medical History:   Diagnosis Date    History of gonorrhea     Hypertension     Migraine without aura, intractable, without status migrainosus     Noncompliance with medication regimen     Syncope      Past Surgical History:   Procedure Laterality Date    Abdominal Wall Mass Surgery  2019    CARPAL TUNNEL RELEASE Right 2023    Procedure: RELEASE, CARPAL TUNNEL;  Surgeon: Sameer Kingsley MD;  Location: [facility];  Service: Orthopedics;  Laterality: Right;     SECTION  08/10/2008     SECTION  01/15/2013    DILATION AND CURETTAGE OF UTERUS  2012     Family History   Problem Relation Name Age of Onset    Hypertension Mother      Hypertension Father      No Known Problems Brother      No Known Problems Sister      No Known Problems Sister      No Known Problems Sister       Social History     Tobacco Use    Smoking status: Every Day     Current packs/day: 1.00     Average packs/day: 1 pack/day for 19.1 years (19.1 ttl pk-yrs)     Types: Cigarettes     Start date:      Passive exposure: Current    Smokeless tobacco: Never   Substance Use Topics    Alcohol use: Never    Drug use: Never     Review of Systems   All other systems reviewed and are negative.      Physical Exam     Initial Vitals [25 1608]   BP Pulse Resp Temp SpO2   130/82 (!) 123 16 100.2 °F (37.9 °C) 100 %      MAP       --         Physical Exam    Constitutional: She appears well-developed and well-nourished. She is not diaphoretic. No distress.    HENT:   Head: Normocephalic and atraumatic.   Eyes: Pupils are equal, round, and reactive to light.   Neck: Neck supple.   Cardiovascular:  Normal rate.           Pulmonary/Chest: Breath sounds normal. No respiratory distress.   Abdominal: Abdomen is soft. She exhibits no distension. There is no abdominal tenderness. There is no rebound and no guarding.   Musculoskeletal:      Cervical back: Neck supple.     Neurological: She is alert and oriented to person, place, and time. GCS score is 15. GCS eye subscore is 4. GCS verbal subscore is 5. GCS motor subscore is 6.   Skin: Skin is warm and dry.   Psychiatric: She has a normal mood and affect. Her behavior is normal. Judgment and thought content normal.         Medical Screening Exam   See Full Note    ED Course   Procedures  Labs Reviewed   INFLUENZA A & B BY MOLECULAR - Abnormal       Result Value    INFLUENZA A MOLECULAR Positive (*)     INFLUENZA B MOLECULAR  Negative     SARS-COV-2 RNA AMPLIFICATION, QUAL - Normal    SARS COV-2 Molecular Negative      Narrative:     Negative SARS-CoV results should not be used as the sole basis for treatment or patient management decisions; negative results should be considered in the context of a patient's recent exposures, history and the presene of clinical signs and symptoms consistent with COVID-19.  Negative results should be treated as presumptive and confirmed by molecular assay, if necessary for patient management.          Imaging Results    None          Medications - No data to display  Medical Decision Making  Patient is a 34-year-old  female who presents to the emergency department with complaint of chills, body aches, sore throat, cough, runny nose, congestion, nausea x1 day.  We will swab.                                      Clinical Impression:   Final diagnoses:  [J10.1] Influenza A (Primary)        ED Disposition Condition    Discharge Stable        Tylenol and Motrin as needed for  fever.  Drink plenty of fluids.  Follow up with primary care provider in 2-3 days if no better.  Return to the ER for new or worsening symptoms.  ED Prescriptions    None       Follow-up Information       Follow up With Specialties Details Why Contact Info    Primary care provider  Call in 2 days      [facility]  As needed, If symptoms worsen [address] [place] [id number] [phone]             Rebeca Gomez, FNP  01/21/25 [id number]

## 2025-02-11 ENCOUNTER — OFFICE VISIT (OUTPATIENT)
Dept: OBSTETRICS AND GYNECOLOGY | Facility: CLINIC | Age: 35
End: 2025-02-11
Payer: MEDICAID

## 2025-02-11 VITALS
DIASTOLIC BLOOD PRESSURE: 88 MMHG | OXYGEN SATURATION: 99 % | SYSTOLIC BLOOD PRESSURE: 123 MMHG | RESPIRATION RATE: 18 BRPM | BODY MASS INDEX: 31.79 KG/M2 | HEIGHT: 66 IN | TEMPERATURE: 98 F | WEIGHT: 197.81 LBS | HEART RATE: 90 BPM

## 2025-02-11 DIAGNOSIS — N89.8 VAGINAL LESION: ICD-10-CM

## 2025-02-11 DIAGNOSIS — N89.8 VAGINAL DISCHARGE: Primary | ICD-10-CM

## 2025-02-11 DIAGNOSIS — N94.89 VAGINAL BURNING: ICD-10-CM

## 2025-02-11 LAB
BACTERIAL VAGINOSIS DNA (OHS): NEGATIVE
BILIRUB SERPL-MCNC: NORMAL MG/DL
BLOOD, POC UA: 100
CANDIDA GLABRATA/KRUSEI DNA (OHS): NOT DETECTED
CANDIDA SPECIES DNA (OHS): DETECTED
GLUCOSE UR QL STRIP: NORMAL
KETONES UR QL STRIP: NORMAL
LEUKOCYTE ESTERASE URINE, POC: NORMAL
NITRITE, POC UA: NORMAL
PH, POC UA: 6
PROTEIN, POC: NORMAL
SPECIFIC GRAVITY, POC UA: NORMAL
TRICHOMONAS VAGINALIS DNA (OHS): NOT DETECTED
UROBILINOGEN, POC UA: NORMAL

## 2025-02-11 PROCEDURE — 3008F BODY MASS INDEX DOCD: CPT | Mod: CPTII,,, | Performed by: ADVANCED PRACTICE MIDWIFE

## 2025-02-11 PROCEDURE — 3074F SYST BP LT 130 MM HG: CPT | Mod: CPTII,,, | Performed by: ADVANCED PRACTICE MIDWIFE

## 2025-02-11 PROCEDURE — 3079F DIAST BP 80-89 MM HG: CPT | Mod: CPTII,,, | Performed by: ADVANCED PRACTICE MIDWIFE

## 2025-02-11 PROCEDURE — 1159F MED LIST DOCD IN RCRD: CPT | Mod: CPTII,,, | Performed by: ADVANCED PRACTICE MIDWIFE

## 2025-02-11 PROCEDURE — 81515 NFCT DS BV&VAGINITIS DNA ALG: CPT | Mod: QW,,, | Performed by: CLINICAL MEDICAL LABORATORY

## 2025-02-11 PROCEDURE — 81003 URINALYSIS AUTO W/O SCOPE: CPT | Mod: QW,,, | Performed by: ADVANCED PRACTICE MIDWIFE

## 2025-02-11 PROCEDURE — 99214 OFFICE O/P EST MOD 30 MIN: CPT | Mod: ,,, | Performed by: ADVANCED PRACTICE MIDWIFE

## 2025-02-11 RX ORDER — METRONIDAZOLE 500 MG/1
500 TABLET ORAL 2 TIMES DAILY
Qty: 14 TABLET | Refills: 0 | Status: SHIPPED | OUTPATIENT
Start: 2025-02-11 | End: 2025-02-18

## 2025-02-11 RX ORDER — VALACYCLOVIR HYDROCHLORIDE 1 G/1
500 TABLET, FILM COATED ORAL DAILY
Qty: 30 TABLET | Refills: 11 | Status: SHIPPED | OUTPATIENT
Start: 2025-02-11 | End: 2025-03-13

## 2025-02-11 RX ORDER — FLUCONAZOLE 150 MG/1
150 TABLET ORAL DAILY
Qty: 1 TABLET | Refills: 1 | Status: SHIPPED | OUTPATIENT
Start: 2025-02-11 | End: 2025-02-12

## 2025-02-11 NOTE — PROGRESS NOTES
"Subjective     Patient ID: Elsa Duran is a 34 y.o. female.    Chief Complaint: Vaginal Discharge (Yellowish color with blood; burning in the front and back; no odor)    Here for evaluation of vaginal burning and rectal discomfort/burning for approximately 1 week. States she has a h/o genital herpes, had a recent outbreak and requests a refill of medication. States "I think I have a yeast infection and I need some medication". She denies any STD exposure and does not desire to be tested for STDs today. States she notices vaginal bleeding when taking a bath.     Vaginal Discharge  The patient's primary symptoms include vaginal discharge.     Review of Systems   Genitourinary:  Positive for vaginal discharge.   All other systems reviewed and are negative.         Objective     Physical Exam  Vitals reviewed. Exam conducted with a chaperone present.   Constitutional:       Appearance: Normal appearance.   Pulmonary:      Effort: Pulmonary effort is normal.   Genitourinary:     Exam position: Lithotomy position.      Vagina: Vaginal discharge present.      Comments: Vaginal lesions noted to rectal and labial area  Skin:     General: Skin is warm and dry.   Neurological:      General: No focal deficit present.      Mental Status: She is alert and oriented to person, place, and time.   Psychiatric:         Mood and Affect: Mood normal.         Behavior: Behavior normal.         Thought Content: Thought content normal.         Judgment: Judgment normal.            Assessment and Plan     1. Vaginal discharge  -     Vaginosis Screen by DNA Probe; Future; Expected date: 02/11/2025    2. Vaginal burning  -     POCT Urinalysis    3. Vaginal lesion    4. BMI 31.0-31.9,adult    Other orders  -     metroNIDAZOLE (FLAGYL) 500 MG tablet; Take 1 tablet (500 mg total) by mouth 2 (two) times daily. for 7 days  Dispense: 14 tablet; Refill: 0  -     fluconazole (DIFLUCAN) 150 MG Tab; Take 1 tablet (150 mg total) by mouth once daily. " for 1 day  Dispense: 1 tablet; Refill: 1  -     valACYclovir (VALTREX) 1000 MG tablet; Take 0.5 tablets (500 mg total) by mouth once daily.  Dispense: 30 tablet; Refill: 11      Decrease fats, sweets, and carbohydrates discussed  Weight loss and healthy food choices encouraged  [facility]  STD precautions and genital herpes outbreak/suppression discussed  Verbalized understanding to all instructions and information  Questions answered to desired level of satisfaction           Follow up if symptoms worsen or fail to improve.

## 2025-03-27 ENCOUNTER — PATIENT MESSAGE (OUTPATIENT)
Dept: NEUROLOGY | Facility: CLINIC | Age: 35
End: 2025-03-27
Payer: MEDICAID

## 2025-04-03 DIAGNOSIS — Z30.011 ENCOUNTER FOR INITIAL PRESCRIPTION OF CONTRACEPTIVE PILLS: ICD-10-CM

## 2025-04-04 RX ORDER — AMLODIPINE BESYLATE 10 MG/1
10 TABLET ORAL DAILY
Qty: 90 TABLET | Refills: 1 | Status: SHIPPED | OUTPATIENT
Start: 2025-04-04

## 2025-04-09 RX ORDER — VALACYCLOVIR HYDROCHLORIDE 1 G/1
500 TABLET, FILM COATED ORAL DAILY
Qty: 30 TABLET | Refills: 4 | Status: SHIPPED | OUTPATIENT
Start: 2025-04-09 | End: 2025-05-09

## 2025-04-09 RX ORDER — NORETHINDRONE 0.35 MG/1
1 TABLET ORAL DAILY
Qty: 30 TABLET | Refills: 4 | Status: SHIPPED | OUTPATIENT
Start: 2025-04-09 | End: 2026-04-09

## 2025-07-10 ENCOUNTER — HOSPITAL ENCOUNTER (EMERGENCY)
Facility: HOSPITAL | Age: 35
Discharge: HOME | End: 2025-07-10
Payer: MEDICAID

## 2025-07-10 VITALS
TEMPERATURE: 98 F | RESPIRATION RATE: 18 BRPM | WEIGHT: 180 LBS | SYSTOLIC BLOOD PRESSURE: 109 MMHG | HEIGHT: 66 IN | DIASTOLIC BLOOD PRESSURE: 72 MMHG | HEART RATE: 99 BPM | OXYGEN SATURATION: 100 % | BODY MASS INDEX: 28.93 KG/M2

## 2025-07-10 DIAGNOSIS — N39.0 URINARY TRACT INFECTION WITHOUT HEMATURIA, SITE UNSPECIFIED: Primary | ICD-10-CM

## 2025-07-10 DIAGNOSIS — R07.9 CHEST PAIN: ICD-10-CM

## 2025-07-10 LAB
ALBUMIN SERPL BCP-MCNC: 4.2 G/DL (ref 3.5–5)
ALBUMIN/GLOB SERPL: 1 {RATIO}
ALP SERPL-CCNC: 60 U/L (ref 40–150)
ALT SERPL W P-5'-P-CCNC: 9 U/L
ANION GAP SERPL CALCULATED.3IONS-SCNC: 11 MMOL/L (ref 7–16)
AST SERPL W P-5'-P-CCNC: 19 U/L (ref 11–45)
BASOPHILS # BLD AUTO: 0.04 K/UL (ref 0–0.2)
BASOPHILS NFR BLD AUTO: 0.5 % (ref 0–1)
BILIRUB SERPL-MCNC: 0.6 MG/DL
BILIRUB UR QL STRIP: NEGATIVE
BUN SERPL-MCNC: 12 MG/DL (ref 7–19)
BUN/CREAT SERPL: 13 (ref 6–20)
CALCIUM SERPL-MCNC: 9 MG/DL (ref 8.4–10.2)
CHLORIDE SERPL-SCNC: 105 MMOL/L (ref 98–107)
CLARITY UR: ABNORMAL
CO2 SERPL-SCNC: 25 MMOL/L (ref 22–29)
COLOR UR: ABNORMAL
CREAT SERPL-MCNC: 0.9 MG/DL (ref 0.55–1.02)
DIFFERENTIAL METHOD BLD: ABNORMAL
EGFR (NO RACE VARIABLE) (RUSH/TITUS): 86 ML/MIN/1.73M2
EOSINOPHIL # BLD AUTO: 0.03 K/UL (ref 0–0.5)
EOSINOPHIL NFR BLD AUTO: 0.4 % (ref 1–4)
ERYTHROCYTE [DISTWIDTH] IN BLOOD BY AUTOMATED COUNT: 16.1 % (ref 11.5–14.5)
GLOBULIN SER-MCNC: 4.1 G/DL (ref 2–4)
GLUCOSE SERPL-MCNC: 87 MG/DL (ref 74–100)
GLUCOSE UR STRIP-MCNC: NORMAL MG/DL
HCT VFR BLD AUTO: 33.9 % (ref 38–47)
HGB BLD-MCNC: 10.3 G/DL (ref 12–16)
HYALINE CASTS #/AREA URNS LPF: ABNORMAL /LPF
IMM GRANULOCYTES # BLD AUTO: 0.03 K/UL (ref 0–0.04)
IMM GRANULOCYTES NFR BLD: 0.4 % (ref 0–0.4)
KETONES UR STRIP-SCNC: ABNORMAL MG/DL
LEUKOCYTE ESTERASE UR QL STRIP: ABNORMAL
LYMPHOCYTES # BLD AUTO: 1.24 K/UL (ref 1–4.8)
LYMPHOCYTES NFR BLD AUTO: 15.7 % (ref 27–41)
MCH RBC QN AUTO: 24.5 PG (ref 27–31)
MCHC RBC AUTO-ENTMCNC: 30.4 G/DL (ref 32–36)
MCV RBC AUTO: 80.7 FL (ref 80–96)
MONOCYTES # BLD AUTO: 0.45 K/UL (ref 0–0.8)
MONOCYTES NFR BLD AUTO: 5.7 % (ref 2–6)
MPC BLD CALC-MCNC: 9.8 FL (ref 9.4–12.4)
MUCOUS, UA: ABNORMAL /LPF
NEUTROPHILS # BLD AUTO: 6.13 K/UL (ref 1.8–7.7)
NEUTROPHILS NFR BLD AUTO: 77.3 % (ref 53–65)
NITRITE UR QL STRIP: NEGATIVE
NRBC # BLD AUTO: 0 X10E3/UL
NRBC, AUTO (.00): 0 %
PH UR STRIP: 5.5 PH UNITS
PLATELET # BLD AUTO: 402 K/UL (ref 150–400)
POTASSIUM SERPL-SCNC: 4 MMOL/L (ref 3.5–5.1)
PROT SERPL-MCNC: 8.3 G/DL (ref 6.4–8.3)
PROT UR QL STRIP: 100
RBC # BLD AUTO: 4.2 M/UL (ref 4.2–5.4)
RBC # UR STRIP: ABNORMAL /UL
RBC #/AREA URNS HPF: >182 /HPF
SODIUM SERPL-SCNC: 137 MMOL/L (ref 136–145)
SP GR UR STRIP: 1.03
SQUAMOUS #/AREA URNS LPF: ABNORMAL /HPF
TROPONIN I SERPL HS-MCNC: <2.7 NG/L
TROPONIN I SERPL HS-MCNC: <2.7 NG/L
UROBILINOGEN UR STRIP-ACNC: NORMAL MG/DL
WBC # BLD AUTO: 7.92 K/UL (ref 4.5–11)
WBC #/AREA URNS HPF: 55 /HPF

## 2025-07-10 PROCEDURE — 81003 URINALYSIS AUTO W/O SCOPE: CPT

## 2025-07-10 PROCEDURE — 87086 URINE CULTURE/COLONY COUNT: CPT

## 2025-07-10 PROCEDURE — 93010 ELECTROCARDIOGRAM REPORT: CPT | Mod: ,,, | Performed by: INTERNAL MEDICINE

## 2025-07-10 PROCEDURE — 93005 ELECTROCARDIOGRAM TRACING: CPT

## 2025-07-10 PROCEDURE — 36415 COLL VENOUS BLD VENIPUNCTURE: CPT

## 2025-07-10 PROCEDURE — 84484 ASSAY OF TROPONIN QUANT: CPT

## 2025-07-10 PROCEDURE — 63600175 PHARM REV CODE 636 W HCPCS: Mod: UD

## 2025-07-10 PROCEDURE — 99285 EMERGENCY DEPT VISIT HI MDM: CPT | Mod: 25

## 2025-07-10 PROCEDURE — 96372 THER/PROPH/DIAG INJ SC/IM: CPT

## 2025-07-10 PROCEDURE — 85025 COMPLETE CBC W/AUTO DIFF WBC: CPT

## 2025-07-10 PROCEDURE — 80053 COMPREHEN METABOLIC PANEL: CPT

## 2025-07-10 RX ORDER — CEFUROXIME AXETIL 500 MG/1
500 TABLET ORAL EVERY 12 HOURS
Qty: 20 TABLET | Refills: 0 | Status: SHIPPED | OUTPATIENT
Start: 2025-07-10 | End: 2025-07-20

## 2025-07-10 RX ORDER — CEFTRIAXONE 1 G/1
1 INJECTION, POWDER, FOR SOLUTION INTRAMUSCULAR; INTRAVENOUS
Status: COMPLETED | OUTPATIENT
Start: 2025-07-10 | End: 2025-07-10

## 2025-07-10 RX ORDER — PHENAZOPYRIDINE HYDROCHLORIDE 200 MG/1
200 TABLET, FILM COATED ORAL 3 TIMES DAILY
Qty: 9 TABLET | Refills: 0 | Status: SHIPPED | OUTPATIENT
Start: 2025-07-10 | End: 2025-07-13

## 2025-07-10 RX ORDER — CEFTRIAXONE 1 G/1
1 INJECTION, POWDER, FOR SOLUTION INTRAMUSCULAR; INTRAVENOUS
Status: DISCONTINUED | OUTPATIENT
Start: 2025-07-10 | End: 2025-07-10

## 2025-07-10 RX ADMIN — CEFTRIAXONE SODIUM 1 G: 1 INJECTION, POWDER, FOR SOLUTION INTRAMUSCULAR; INTRAVENOUS at 07:07

## 2025-07-10 NOTE — Clinical Note
"Corinna LANEICA" Roger was seen and treated in our emergency department on 7/10/2025.  She may return to work on 07/12/2025.       If you have any questions or concerns, please don't hesitate to call.      Delio Bowers, NP"

## 2025-07-10 NOTE — Clinical Note
"Corinna LANEICA" Roger was seen and treated in our emergency department on 7/10/2025.  She may return to work on 07/11/2025.       If you have any questions or concerns, please don't hesitate to call.      nori ROBERTS    "

## 2025-07-11 NOTE — ED PROVIDER NOTES
Encounter Date: 7/10/2025       History     Chief Complaint   Patient presents with    Loss of Consciousness     Pt states that she got out of the shower and passed out.  Pt drove herself to ER.  C/o CP that has been ongoing since Tuesday.     Chest Pain     34-year-old female presents to the emergency department for evaluation of loss of consciousness, chest pain that began shortly prior to arrival in ED. reports that she was in the shower and stepped out when she began feeling lightheaded and dizzy with chest pain.  Reports that she then had a syncopal episode and woke up on the floor.  Reports that symptoms have since resolved.  Denies fever, chills, nausea, vomiting, , abdominal pain, recent illness.    The history is provided by the patient. No  was used.   General Illness   The current episode started just prior to arrival. The problem has been resolved. The pain is at a severity of 0/10. Pertinent negatives include no fever, no abdominal pain, no diarrhea, no nausea, no vomiting, no congestion, no headaches, no muscle aches, no neck pain, no shortness of breath, no wheezing, no diaper rash and no pain. She has received no recent medical care.     Review of patient's allergies indicates:  No Known Allergies  Past Medical History:   Diagnosis Date    History of gonorrhea     Hypertension     Migraine without aura, intractable, without status migrainosus     Noncompliance with medication regimen     Syncope      Past Surgical History:   Procedure Laterality Date    Abdominal Wall Mass Surgery  2019    CARPAL TUNNEL RELEASE Right 2023    Procedure: RELEASE, CARPAL TUNNEL;  Surgeon: Sameer Kingsley MD;  Location: [facility];  Service: Orthopedics;  Laterality: Right;     SECTION  08/10/2008     SECTION  01/15/2013    DILATION AND CURETTAGE OF UTERUS  2012     Family History   Problem Relation Name Age of Onset    Hypertension Mother      Hypertension  Father      No Known Problems Brother      No Known Problems Sister      No Known Problems Sister      No Known Problems Sister       Social History[1]  Review of Systems   Constitutional:  Negative for activity change, appetite change, chills and fever.   HENT:  Negative for congestion.    Eyes:  Negative for pain and visual disturbance.   Respiratory:  Negative for apnea, shortness of breath and wheezing.    Cardiovascular:  Negative for chest pain, palpitations and leg swelling.   Gastrointestinal:  Negative for abdominal pain, diarrhea, nausea and vomiting.   Genitourinary:  Negative for decreased urine volume, difficulty urinating, dysuria and flank pain.   Musculoskeletal:  Negative for gait problem, neck pain and neck stiffness.   Neurological:  Positive for syncope. Negative for dizziness, tremors, speech difficulty, weakness, light-headedness and headaches.   Psychiatric/Behavioral:  Negative for confusion.    All other systems reviewed and are negative.      Physical Exam     Initial Vitals [07/10/25 1618]   BP Pulse Resp Temp SpO2   (!) 129/90 99 16 98 °F (36.7 °C) 100 %      MAP       --         Physical Exam    Vitals reviewed.  Constitutional: She appears well-nourished. No distress.   HENT:   Head: Normocephalic.   Eyes: Conjunctivae are normal. Right eye exhibits no discharge. Left eye exhibits no discharge.   Neck: Neck supple.   Normal range of motion.  Cardiovascular:  Normal rate, regular rhythm and normal heart sounds.           Pulmonary/Chest: Breath sounds normal. No respiratory distress. She has no wheezes. She has no rhonchi. She has no rales. She exhibits no tenderness.   Abdominal: Abdomen is soft and flat. Bowel sounds are normal. She exhibits no distension and no mass. There is no abdominal tenderness.   No right CVA tenderness.  No left CVA tenderness. There is no rebound, no guarding and negative Du's sign.   Musculoskeletal:         General: No tenderness. Normal range of motion.       Cervical back: Normal range of motion and neck supple.     Lymphadenopathy:     She has no cervical adenopathy.   Neurological: She is alert and oriented to person, place, and time. She has normal strength. No sensory deficit. GCS score is 15. GCS eye subscore is 4. GCS verbal subscore is 5. GCS motor subscore is 6.   Skin: Skin is warm and dry. Capillary refill takes less than 2 seconds.   Psychiatric: She has a normal mood and affect. Her behavior is normal.         Medical Screening Exam   See Full Note    ED Course   Procedures  Labs Reviewed   URINALYSIS, REFLEX TO URINE CULTURE - Abnormal       Result Value    Color, UA Red (*)     Clarity, UA Extra Turbid      pH, UA 5.5      Leukocytes, UA Small (*)     Nitrites, UA Negative      Protein,  (*)     Glucose, UA Normal      Ketones, UA Trace      Urobilinogen, UA Normal      Bilirubin, UA Negative      Blood, UA Large (*)     Specific Gravity, UA 1.032 (*)    COMPREHENSIVE METABOLIC PANEL - Abnormal    Sodium 137      Potassium 4.0      Chloride 105      CO2 25      Anion Gap 11      Glucose 87      BUN 12      Creatinine 0.90      BUN/Creatinine Ratio 13      Calcium 9.0      Total Protein 8.3      Albumin 4.2      Globulin 4.1 (*)     A/G Ratio 1.0      Bilirubin, Total 0.6      Alk Phos 60      ALT 9      AST 19      eGFR 86     CBC WITH DIFFERENTIAL - Abnormal    WBC 7.92      RBC 4.20      Hemoglobin 10.3 (*)     Hematocrit 33.9 (*)     MCV 80.7      MCH 24.5 (*)     MCHC 30.4 (*)     RDW 16.1 (*)     Platelet Count 402 (*)     MPV 9.8      Neutrophils % 77.3 (*)     Lymphocytes % 15.7 (*)     Monocytes % 5.7      Eosinophils % 0.4 (*)     Basophils % 0.5      Immature Granulocytes % 0.4      nRBC, Auto 0.0      Neutrophils, Abs 6.13      Lymphocytes, Absolute 1.24      Monocytes, Absolute 0.45      Eosinophils, Absolute 0.03      Basophils, Absolute 0.04      Immature Granulocytes, Absolute 0.03      nRBC, Absolute 0.00      Diff Type Auto      URINALYSIS, MICROSCOPIC - Abnormal    WBC, UA 55 (*)     RBC, UA >182 (*)     Squamous Epithelial Cells, UA Occasional (*)     Hyaline Casts, UA 2-5 (*)     Mucous Many (*)    TROPONIN I - Normal    Troponin I High Sensitivity <2.7     TROPONIN I - Normal    Troponin I High Sensitivity <2.7     CULTURE, URINE   CBC W/ AUTO DIFFERENTIAL    Narrative:     The following orders were created for panel order CBC auto differential.  Procedure                               Abnormality         Status                     ---------                               -----------         ------                     CBC with Differential[[id number]]       Abnormal            Final result                 Please view results for these tests on the individual orders.          Imaging Results              X-Ray Chest PA And Lateral (Final result)  Result time 07/10/25 19:22:21      Final result by Zechariah Langford MD (07/10/25 19:22:21)                   Impression:      No acute radiographic abnormality.      Electronically signed by: Zechariah Langford  Date:    07/10/2025  Time:    19:22               Narrative:    EXAMINATION:  XR CHEST PA AND LATERAL    CLINICAL HISTORY:  Chest pain, unspecified    TECHNIQUE:  PA and lateral views of the chest were performed.    COMPARISON:  06/19/2023    FINDINGS:  The lungs are clear, with normal appearance of pulmonary vasculature and no pleural effusion or pneumothorax.    The cardiac silhouette is normal in size. The hilar and mediastinal contours are unremarkable.    Bones are intact. Stable mild dextroscoliosis of the mid to lower thoracic spine.                                       Medications   cefTRIAXone injection 1 g (1 g Intramuscular Given 7/10/25 1936)     Medical Decision Making  34-year-old female presents to the emergency department for evaluation of loss of consciousness, chest pain that began shortly prior to arrival in ED. reports that she was in the shower and stepped out when she  began feeling lightheaded and dizzy with chest pain.  Reports that she then had a syncopal episode and woke up on the floor.  Reports that symptoms have since resolved.  Denies fever, chills, nausea, vomiting, , abdominal pain, recent illness.  EKG ordered and reviewed with results significant for normal sinus rhythm at 98 beats per minute, no STEMI.  Interpreted by Dr. Reaves  Labs, urinalysis ordered   Ordered and reviewed chest x-ray as well as radiologist's interpretation with results significant for no acute abnormality  Rocephin IM administered in ED   Follow up and return precautions discussed with patient, who verbalized understanding  Prescriptions provided   Diagnosis: UTI    Amount and/or Complexity of Data Reviewed  Labs: ordered.  Radiology: ordered.    Risk  Prescription drug management.                                      Clinical Impression:   Final diagnoses:  [R07.9] Chest pain  [N39.0] Urinary tract infection without hematuria, site unspecified (Primary)        ED Disposition Condition    Discharge Stable          ED Prescriptions       Medication Sig Dispense Start Date End Date Auth. Provider    cefUROXime (CEFTIN) 500 MG tablet Take 1 tablet (500 mg total) by mouth every 12 (twelve) hours. for 10 days 20 tablet 7/10/2025 7/20/2025 Delio Bowers NP    phenazopyridine (PYRIDIUM) 200 MG tablet Take 1 tablet (200 mg total) by mouth 3 (three) times daily. for 3 days 9 tablet 7/10/2025 7/13/2025 Delio Bowers NP          Follow-up Information    None            [1]   Social History  Tobacco Use    Smoking status: Every Day     Current packs/day: 1.00     Average packs/day: 1 pack/day for 19.5 years (19.5 ttl pk-yrs)     Types: Cigarettes     Start date: 2006     Passive exposure: Current    Smokeless tobacco: Never   Substance Use Topics    Alcohol use: Never    Drug use: Never        Delio Bowers NP  07/10/25 4216

## 2025-07-11 NOTE — DISCHARGE INSTRUCTIONS
Take medication as ordered, take with food and drink plenty of water. Follow-up with primary care provider in three days for re-evaluation. Return to the emergency department for new or worsening symptoms.

## 2025-07-12 LAB — UA COMPLETE W REFLEX CULTURE PNL UR: NO GROWTH

## 2025-07-17 LAB
OHS QRS DURATION: 84 MS
OHS QTC CALCULATION: 457 MS

## 2025-07-29 ENCOUNTER — OFFICE VISIT (OUTPATIENT)
Dept: FAMILY MEDICINE | Facility: CLINIC | Age: 35
End: 2025-07-29
Payer: MEDICAID

## 2025-07-29 VITALS
RESPIRATION RATE: 18 BRPM | OXYGEN SATURATION: 98 % | WEIGHT: 203 LBS | DIASTOLIC BLOOD PRESSURE: 80 MMHG | TEMPERATURE: 98 F | HEART RATE: 89 BPM | BODY MASS INDEX: 32.62 KG/M2 | HEIGHT: 66 IN | SYSTOLIC BLOOD PRESSURE: 133 MMHG

## 2025-07-29 DIAGNOSIS — R35.0 URINARY FREQUENCY: Primary | ICD-10-CM

## 2025-07-29 DIAGNOSIS — M19.90 OSTEOARTHRITIS, UNSPECIFIED OSTEOARTHRITIS TYPE, UNSPECIFIED SITE: ICD-10-CM

## 2025-07-29 DIAGNOSIS — M19.012 PRIMARY OSTEOARTHRITIS OF LEFT SHOULDER: ICD-10-CM

## 2025-07-29 LAB
BILIRUB UR QL STRIP: NEGATIVE
CLARITY UR: CLEAR
COLOR UR: YELLOW
CREAT UR-MCNC: 509 MG/DL (ref 15–325)
GLUCOSE UR STRIP-MCNC: NORMAL MG/DL
KETONES UR STRIP-SCNC: NEGATIVE MG/DL
LEUKOCYTE ESTERASE UR QL STRIP: NEGATIVE
MICROALBUMIN UR-MCNC: 3.4 MG/DL
MICROALBUMIN/CREAT RATIO PNL UR: 6.7 MG/G (ref 0–30)
MUCOUS, UA: ABNORMAL /LPF
NITRITE UR QL STRIP: NEGATIVE
PH UR STRIP: 6 PH UNITS
PROT UR QL STRIP: 30
RBC # UR STRIP: NEGATIVE /UL
RBC #/AREA URNS HPF: 1 /HPF
SP GR UR STRIP: 1.04
SQUAMOUS #/AREA URNS LPF: ABNORMAL /HPF
UROBILINOGEN UR STRIP-ACNC: NORMAL MG/DL
WBC #/AREA URNS HPF: 3 /HPF

## 2025-07-29 PROCEDURE — 96372 THER/PROPH/DIAG INJ SC/IM: CPT | Mod: ,,, | Performed by: INTERNAL MEDICINE

## 2025-07-29 PROCEDURE — 3079F DIAST BP 80-89 MM HG: CPT | Mod: CPTII,,, | Performed by: INTERNAL MEDICINE

## 2025-07-29 PROCEDURE — 82043 UR ALBUMIN QUANTITATIVE: CPT | Mod: ,,, | Performed by: CLINICAL MEDICAL LABORATORY

## 2025-07-29 PROCEDURE — 81001 URINALYSIS AUTO W/SCOPE: CPT | Mod: ,,, | Performed by: CLINICAL MEDICAL LABORATORY

## 2025-07-29 PROCEDURE — 3008F BODY MASS INDEX DOCD: CPT | Mod: CPTII,,, | Performed by: INTERNAL MEDICINE

## 2025-07-29 PROCEDURE — 99213 OFFICE O/P EST LOW 20 MIN: CPT | Mod: 25,,, | Performed by: INTERNAL MEDICINE

## 2025-07-29 PROCEDURE — 82570 ASSAY OF URINE CREATININE: CPT | Mod: ,,, | Performed by: CLINICAL MEDICAL LABORATORY

## 2025-07-29 PROCEDURE — 3075F SYST BP GE 130 - 139MM HG: CPT | Mod: CPTII,,, | Performed by: INTERNAL MEDICINE

## 2025-07-29 RX ORDER — NAPROXEN 500 MG/1
500 TABLET ORAL 2 TIMES DAILY
Qty: 30 TABLET | Refills: 0 | Status: SHIPPED | OUTPATIENT
Start: 2025-07-29

## 2025-07-29 RX ORDER — KETOROLAC TROMETHAMINE 30 MG/ML
15 INJECTION, SOLUTION INTRAMUSCULAR; INTRAVENOUS
Status: COMPLETED | OUTPATIENT
Start: 2025-07-29 | End: 2025-07-29

## 2025-07-29 RX ADMIN — KETOROLAC TROMETHAMINE 15 MG: 30 INJECTION, SOLUTION INTRAMUSCULAR; INTRAVENOUS at 10:07

## 2025-07-29 NOTE — PROGRESS NOTES
"Subjective:       Patient ID: Elsa Duran is a 34 y.o. female.    Chief Complaint: Follow-up (Hospital follow up Pt stated that she having some Frequency urinary and also her right shoulder is hurting ) and Health Maintenance (COVID-19 Vaccine(3 - 2024-25 season) due on 09/01/2024- Pt Declined /)    History of Present Illness    CHIEF COMPLAINT:  Patient presents today with left shoulder pain.    LEFT SHOULDER PAIN:  She reports left shoulder pain present for the last 5 days, with ongoing issues over the past month. She has a known history of shoulder arthritis. Pain is exacerbated with palpation and movement with persistent discomfort in the left shoulder region.    HISTORY OF UTI:  She has a history of urinary tract infection during her last medical visit. She currently denies urinary frequency, burning, or other acute urinary symptoms.         Current Medications:  Current Medications[1]           ROS  Twelve point system reviewed, unremarkable except for stated above in HPI.        Objective:         Vitals:    07/29/25 0938   BP: 133/80   BP Location: Left arm   Patient Position: Sitting   Pulse: 89   Resp: 18   Temp: 97.6 °F (36.4 °C)   TempSrc: Oral   SpO2: 98%   Weight: 92.1 kg (203 lb)   Height: 5' 6" (1.676 m)        Physical Exam     Patient is awake alert oriented person place and  Lungs are clear to auscultation bilaterally no crackles or wheezes   Cardiovascular S1-S2 regular rate and rhythm no murmurs rubs or gallops   Abdomen is soft positive bowel sounds nontender, extremities no clubbing cyanosis edema  Neuro no focal neurological deficits  Skin warm and dry.     Last Labs:     Admission on 07/10/2025, Discharged on 07/10/2025   Component Date Value    QRS Duration 07/10/2025 84     [facility] QTC Calculation 07/10/2025 457     Color, UA 07/10/2025 Red (A)     Clarity, UA 07/10/2025 Extra Turbid     pH, UA 07/10/2025 5.5     Leukocytes, UA 07/10/2025 Small (A)     Nitrites, UA 07/10/2025 Negative     " Protein, UA 07/10/2025 100 (A)     Glucose, UA 07/10/2025 Normal     Ketones, UA 07/10/2025 Trace     Urobilinogen, UA 07/10/2025 Normal     Bilirubin, UA 07/10/2025 Negative     Blood, UA 07/10/2025 Large (A)     Specific Onward, UA 07/10/2025 1.032 (H)     Sodium 07/10/2025 137     Potassium 07/10/2025 4.0     Chloride 07/10/2025 105     CO2 07/10/2025 25     Anion Gap 07/10/2025 11     Glucose 07/10/2025 87     BUN 07/10/2025 12     Creatinine 07/10/2025 0.90     BUN/Creatinine Ratio 07/10/2025 13     Calcium 07/10/2025 9.0     Total Protein 07/10/2025 8.3     Albumin 07/10/2025 4.2     Globulin 07/10/2025 4.1 (H)     A/G Ratio 07/10/2025 1.0     Bilirubin, Total 07/10/2025 0.6     Alk Phos 07/10/2025 60     ALT 07/10/2025 9     AST 07/10/2025 19     eGFR 07/10/2025 86     WBC 07/10/2025 7.92     RBC 07/10/2025 4.20     Hemoglobin 07/10/2025 10.3 (L)     Hematocrit 07/10/2025 33.9 (L)     MCV 07/10/2025 80.7     MCH 07/10/2025 24.5 (L)     MCHC 07/10/2025 30.4 (L)     RDW 07/10/2025 16.1 (H)     Platelet Count 07/10/2025 402 (H)     MPV 07/10/2025 9.8     Neutrophils % 07/10/2025 77.3 (H)     Lymphocytes % 07/10/2025 15.7 (L)     Monocytes % 07/10/2025 5.7     Eosinophils % 07/10/2025 0.4 (L)     Basophils % 07/10/2025 0.5     Immature Granulocytes % 07/10/2025 0.4     nRBC, Auto 07/10/2025 0.0     Neutrophils, Abs 07/10/2025 6.13     Lymphocytes, Absolute 07/10/2025 1.24     Monocytes, Absolute 07/10/2025 0.45     Eosinophils, Absolute 07/10/2025 0.03     Basophils, Absolute 07/10/2025 0.04     Immature Granulocytes, A* 07/10/2025 0.03     nRBC, Absolute 07/10/2025 0.00     Diff Type 07/10/2025 Auto     Troponin I High Sensitiv* 07/10/2025 <2.7     WBC, UA 07/10/2025 55 (H)     RBC, UA 07/10/2025 >182 (H)     Squamous Epithelial Cell* 07/10/2025 Occasional (A)     Hyaline Casts, UA 07/10/2025 2-5 (A)     Mucous 07/10/2025 Many (A)     Culture, Urine 07/10/2025 No Growth     Troponin I High Sensitiv* 07/10/2025  <2.7        Last Imaging:  X-Ray Chest PA And Lateral  Narrative: EXAMINATION:  XR CHEST PA AND LATERAL    CLINICAL HISTORY:  Chest pain, unspecified    TECHNIQUE:  PA and lateral views of the chest were performed.    COMPARISON:  06/19/2023    FINDINGS:  The lungs are clear, with normal appearance of pulmonary vasculature and no pleural effusion or pneumothorax.    The cardiac silhouette is normal in size. The hilar and mediastinal contours are unremarkable.    Bones are intact. Stable mild dextroscoliosis of the mid to lower thoracic spine.  Impression: No acute radiographic abnormality.    Electronically signed by: Zechariah De La Rosa  Date:    07/10/2025  Time:    19:22         **Labs and x-rays personally reviewed by me    ** reviewed           Assessment & Plan:   Assessment & Plan    IMPRESSION:  - Assessed left shoulder pain, ongoing for past month with worsening in last 5 days.  - Noted patient able to raise arm, suggesting absence of tear but possible arthritis.  - Initiated multi-pronged approach: immediate pain management, diagnostic imaging, specialist referral, and physical therapy.    SHOULDER PAIN:  - Started pain medication and administered pain injection in office for immediate relief.  - Ordered XR Left Shoulder.  - Referred to Dr. Westbrook, orthopedic shoulder specialist, and for physical therapy for shoulder pain management.    URINARY TRACT INFECTION (UTI) FOLLOW-UP:  - Ordered urinalysis to follow up on previous UTI.           1. Urinary frequency  -     Microalbumin/Creatinine Ratio, Urine; Future; Expected date: 07/29/2025  -     Urinalysis, Reflex to Urine Culture; Future; Expected date: 07/29/2025    2. Primary osteoarthritis of left shoulder  -     Ambulatory referral/consult to Orthopedics; Future; Expected date: 08/05/2025  -     naproxen (NAPROSYN) 500 MG tablet; Take 1 tablet (500 mg total) by mouth 2 (two) times daily.  Dispense: 30 tablet; Refill: 0  -     Ambulatory Referral/Consult  to Physical Therapy; Future; Expected date: 08/05/2025  -     ketorolac injection 15 mg                Brett Dias MD  This note was generated with the assistance of ambient listening technology. Verbal consent was obtained by the patient and accompanying visitor(s) for the recording of patient appointment to facilitate this note. I attest to having reviewed and edited the generated note for accuracy, though some syntax or spelling errors may persist. Please contact the author of this note for any clarification.            [1]   Current Outpatient Medications:     amLODIPine (NORVASC) 10 MG tablet, Take 1 tablet (10 mg total) by mouth once daily., Disp: 90 tablet, Rfl: 1    ARIPiprazole (ABILIFY) 5 MG Tab, Take 5 mg by mouth every evening., Disp: , Rfl:     norethindrone (MICRONOR) 0.35 mg tablet, Take 1 tablet (0.35 mg total) by mouth once daily., Disp: 30 tablet, Rfl: 4    OLANZapine (ZYPREXA) 5 MG tablet, Take 5 mg by mouth every evening., Disp: , Rfl:     propranoloL (INDERAL) 40 MG tablet, Take 1 tablet (40 mg total) by mouth 2 (two) times daily., Disp: 60 tablet, Rfl: 11    topiramate (TOPAMAX) 100 MG tablet, Take 1 tablet (100 mg total) by mouth 2 (two) times daily., Disp: 120 tablet, Rfl: 2    valACYclovir (VALTREX) 1000 MG tablet, Take 0.5 tablets (500 mg total) by mouth once daily., Disp: 30 tablet, Rfl: 4    diclofenac sodium (VOLTAREN ARTHRITIS PAIN) 1 % Gel, Apply 2 g topically 2 (two) times daily. (Patient not taking: Reported on 7/29/2025), Disp: 200 g, Rfl: 3    gabapentin (NEURONTIN) 100 MG capsule, Take 1 capsule (100 mg total) by mouth nightly as needed (for pain). (Patient not taking: Reported on 7/29/2025), Disp: 90 capsule, Rfl: 0    galcanezumab-gnlm (EMGALITY PEN) 120 mg/mL PnIj, Inject 1 mL (120 mg total) into the skin every 28 days. (Patient not taking: Reported on 7/29/2025), Disp: 1 each, Rfl: 11    menthol-zinc oxide (CALMOSEPTINE) 0.44-20.6 % Oint, Apply topically 2 (two) times  a day. (Patient not taking: Reported on 7/29/2025), Disp: 113 g, Rfl: 1    naproxen (NAPROSYN) 500 MG tablet, Take 1 tablet (500 mg total) by mouth 2 (two) times daily., Disp: 30 tablet, Rfl: 0  No current facility-administered medications for this visit.

## 2025-07-30 ENCOUNTER — RESULTS FOLLOW-UP (OUTPATIENT)
Dept: FAMILY MEDICINE | Facility: CLINIC | Age: 35
End: 2025-07-30
Payer: MEDICAID

## 2025-07-31 RX ORDER — AMLODIPINE BESYLATE 10 MG/1
10 TABLET ORAL DAILY
Qty: 90 TABLET | Refills: 1 | Status: SHIPPED | OUTPATIENT
Start: 2025-07-31

## 2025-07-31 RX ORDER — TOPIRAMATE 100 MG/1
100 TABLET, FILM COATED ORAL 2 TIMES DAILY
Qty: 120 TABLET | Refills: 1 | Status: SHIPPED | OUTPATIENT
Start: 2025-07-31

## 2025-08-15 PROCEDURE — 99285 EMERGENCY DEPT VISIT HI MDM: CPT

## 2025-08-16 ENCOUNTER — HOSPITAL ENCOUNTER (EMERGENCY)
Facility: HOSPITAL | Age: 35
Discharge: HOME | End: 2025-08-16
Payer: MEDICAID

## 2025-08-16 VITALS
WEIGHT: 199 LBS | BODY MASS INDEX: 31.98 KG/M2 | RESPIRATION RATE: 18 BRPM | HEIGHT: 66 IN | OXYGEN SATURATION: 99 % | HEART RATE: 104 BPM | TEMPERATURE: 99 F | SYSTOLIC BLOOD PRESSURE: 124 MMHG | DIASTOLIC BLOOD PRESSURE: 87 MMHG

## 2025-08-16 DIAGNOSIS — R05.9 COUGH: ICD-10-CM

## 2025-08-16 DIAGNOSIS — U07.1 COVID: Primary | ICD-10-CM

## 2025-08-16 DIAGNOSIS — R07.89 CHEST DISCOMFORT: ICD-10-CM

## 2025-08-16 LAB
ALBUMIN SERPL BCP-MCNC: 4 G/DL (ref 3.5–5)
ALBUMIN/GLOB SERPL: 1.1 {RATIO}
ALP SERPL-CCNC: 50 U/L (ref 40–150)
ALT SERPL W P-5'-P-CCNC: <7 U/L
ANION GAP SERPL CALCULATED.3IONS-SCNC: 10 MMOL/L (ref 7–16)
AST SERPL W P-5'-P-CCNC: 21 U/L (ref 11–45)
BASOPHILS # BLD AUTO: 0.03 K/UL (ref 0–0.2)
BASOPHILS NFR BLD AUTO: 0.5 % (ref 0–1)
BILIRUB SERPL-MCNC: 0.3 MG/DL
BUN SERPL-MCNC: 14 MG/DL (ref 7–19)
BUN/CREAT SERPL: 16 (ref 6–20)
CALCIUM SERPL-MCNC: 9.2 MG/DL (ref 8.4–10.2)
CHLORIDE SERPL-SCNC: 111 MMOL/L (ref 98–107)
CO2 SERPL-SCNC: 22 MMOL/L (ref 22–29)
CREAT SERPL-MCNC: 0.88 MG/DL (ref 0.55–1.02)
DIFFERENTIAL METHOD BLD: ABNORMAL
EGFR (NO RACE VARIABLE) (RUSH/TITUS): 89 ML/MIN/1.73M2
EOSINOPHIL # BLD AUTO: 0.01 K/UL (ref 0–0.5)
EOSINOPHIL NFR BLD AUTO: 0.2 % (ref 1–4)
ERYTHROCYTE [DISTWIDTH] IN BLOOD BY AUTOMATED COUNT: 18.1 % (ref 11.5–14.5)
FLUAV AG UPPER RESP QL IA.RAPID: NEGATIVE
FLUBV AG UPPER RESP QL IA.RAPID: NEGATIVE
GLOBULIN SER-MCNC: 3.7 G/DL (ref 2–4)
GLUCOSE SERPL-MCNC: 63 MG/DL (ref 74–100)
HCT VFR BLD AUTO: 30.6 % (ref 38–47)
HGB BLD-MCNC: 9.5 G/DL (ref 12–16)
IMM GRANULOCYTES # BLD AUTO: 0.04 K/UL (ref 0–0.04)
IMM GRANULOCYTES NFR BLD: 0.7 % (ref 0–0.4)
LYMPHOCYTES # BLD AUTO: 0.3 K/UL (ref 1–4.8)
LYMPHOCYTES NFR BLD AUTO: 5.1 % (ref 27–41)
MCH RBC QN AUTO: 25.1 PG (ref 27–31)
MCHC RBC AUTO-ENTMCNC: 31 G/DL (ref 32–36)
MCV RBC AUTO: 81 FL (ref 80–96)
MONOCYTES # BLD AUTO: 0.64 K/UL (ref 0–0.8)
MONOCYTES NFR BLD AUTO: 10.9 % (ref 2–6)
MPC BLD CALC-MCNC: 10.2 FL (ref 9.4–12.4)
NEUTROPHILS # BLD AUTO: 4.85 K/UL (ref 1.8–7.7)
NEUTROPHILS NFR BLD AUTO: 82.6 % (ref 53–65)
NRBC # BLD AUTO: 0 X10E3/UL
NRBC, AUTO (.00): 0 %
PLATELET # BLD AUTO: 292 K/UL (ref 150–400)
POTASSIUM SERPL-SCNC: 3.2 MMOL/L (ref 3.5–5.1)
PROT SERPL-MCNC: 7.7 G/DL (ref 6.4–8.3)
RBC # BLD AUTO: 3.78 M/UL (ref 4.2–5.4)
SARS-COV-2 RDRP RESP QL NAA+PROBE: POSITIVE
SODIUM SERPL-SCNC: 140 MMOL/L (ref 136–145)
TROPONIN I SERPL HS-MCNC: <2.7 NG/L
WBC # BLD AUTO: 5.87 K/UL (ref 4.5–11)

## 2025-08-16 PROCEDURE — 80053 COMPREHEN METABOLIC PANEL: CPT

## 2025-08-16 PROCEDURE — 93005 ELECTROCARDIOGRAM TRACING: CPT

## 2025-08-16 PROCEDURE — 93010 ELECTROCARDIOGRAM REPORT: CPT | Mod: ,,, | Performed by: INTERNAL MEDICINE

## 2025-08-16 PROCEDURE — 36415 COLL VENOUS BLD VENIPUNCTURE: CPT

## 2025-08-16 PROCEDURE — 85025 COMPLETE CBC W/AUTO DIFF WBC: CPT

## 2025-08-16 PROCEDURE — 84484 ASSAY OF TROPONIN QUANT: CPT

## 2025-08-16 PROCEDURE — 25000003 PHARM REV CODE 250

## 2025-08-16 PROCEDURE — 87635 SARS-COV-2 COVID-19 AMP PRB: CPT

## 2025-08-16 PROCEDURE — 87502 INFLUENZA DNA AMP PROBE: CPT

## 2025-08-16 RX ORDER — IBUPROFEN 600 MG/1
600 TABLET, FILM COATED ORAL
Status: COMPLETED | OUTPATIENT
Start: 2025-08-16 | End: 2025-08-16

## 2025-08-16 RX ADMIN — IBUPROFEN 600 MG: 600 TABLET ORAL at 12:08

## 2025-08-17 LAB
OHS QRS DURATION: 88 MS
OHS QTC CALCULATION: 429 MS

## 2025-08-25 ENCOUNTER — OFFICE VISIT (OUTPATIENT)
Dept: FAMILY MEDICINE | Facility: CLINIC | Age: 35
End: 2025-08-25
Payer: MEDICAID

## 2025-08-25 VITALS
HEART RATE: 72 BPM | OXYGEN SATURATION: 100 % | DIASTOLIC BLOOD PRESSURE: 93 MMHG | WEIGHT: 201.38 LBS | TEMPERATURE: 99 F | RESPIRATION RATE: 18 BRPM | SYSTOLIC BLOOD PRESSURE: 135 MMHG | HEIGHT: 66 IN | BODY MASS INDEX: 32.36 KG/M2

## 2025-08-25 DIAGNOSIS — I10 HYPERTENSION, UNSPECIFIED TYPE: Primary | Chronic | ICD-10-CM

## 2025-08-25 PROCEDURE — 99214 OFFICE O/P EST MOD 30 MIN: CPT | Mod: ,,, | Performed by: INTERNAL MEDICINE

## 2025-08-25 PROCEDURE — 3066F NEPHROPATHY DOC TX: CPT | Mod: CPTII,,, | Performed by: INTERNAL MEDICINE

## 2025-08-25 PROCEDURE — 3075F SYST BP GE 130 - 139MM HG: CPT | Mod: CPTII,,, | Performed by: INTERNAL MEDICINE

## 2025-08-25 PROCEDURE — 1159F MED LIST DOCD IN RCRD: CPT | Mod: CPTII,,, | Performed by: INTERNAL MEDICINE

## 2025-08-25 PROCEDURE — 3061F NEG MICROALBUMINURIA REV: CPT | Mod: CPTII,,, | Performed by: INTERNAL MEDICINE

## 2025-08-25 PROCEDURE — 3008F BODY MASS INDEX DOCD: CPT | Mod: CPTII,,, | Performed by: INTERNAL MEDICINE

## 2025-08-25 PROCEDURE — 3080F DIAST BP >= 90 MM HG: CPT | Mod: CPTII,,, | Performed by: INTERNAL MEDICINE

## 2025-08-25 RX ORDER — TOPIRAMATE 100 MG/1
100 TABLET, FILM COATED ORAL 2 TIMES DAILY
Qty: 120 TABLET | Refills: 1 | Status: SHIPPED | OUTPATIENT
Start: 2025-08-25

## 2025-08-25 RX ORDER — AMLODIPINE BESYLATE 10 MG/1
10 TABLET ORAL DAILY
Qty: 90 TABLET | Refills: 1 | Status: SHIPPED | OUTPATIENT
Start: 2025-08-25

## (undated) DEVICE — SUT ETHILON 4-0 PS2 18 BLK

## (undated) DEVICE — SOL NACL IRR 1000ML BTL

## (undated) DEVICE — BANDAGE MATRIX HK LOOP 2IN 5YD

## (undated) DEVICE — GOWN NONREINF SET-IN SLV 2XL

## (undated) DEVICE — Device

## (undated) DEVICE — GLOVE 8.5 PROTEXIS PI BLUE

## (undated) DEVICE — SLING ARM LARGE FOAM STRAP

## (undated) DEVICE — PADDING WYTEX UNDRCST 2INX4YD

## (undated) DEVICE — GLOVE BIOGEL SKINSENSE PI 8.0

## (undated) DEVICE — SOCKINETTE DOUBLE PLY 4X48IN

## (undated) DEVICE — SPONGE COTTON TRAY 4X4IN

## (undated) DEVICE — APPLICATOR CHLORAPREP ORN 26ML